# Patient Record
Sex: MALE | Race: WHITE | NOT HISPANIC OR LATINO | URBAN - METROPOLITAN AREA
[De-identification: names, ages, dates, MRNs, and addresses within clinical notes are randomized per-mention and may not be internally consistent; named-entity substitution may affect disease eponyms.]

---

## 2017-03-12 ENCOUNTER — EMERGENCY (EMERGENCY)
Facility: HOSPITAL | Age: 77
LOS: 1 days | Discharge: HOME | End: 2017-03-12
Attending: EMERGENCY MEDICINE | Admitting: EMERGENCY MEDICINE
Payer: MEDICARE

## 2017-03-12 VITALS
WEIGHT: 182.98 LBS | TEMPERATURE: 98 F | OXYGEN SATURATION: 98 % | HEART RATE: 69 BPM | RESPIRATION RATE: 16 BRPM | DIASTOLIC BLOOD PRESSURE: 79 MMHG | SYSTOLIC BLOOD PRESSURE: 128 MMHG

## 2017-03-12 DIAGNOSIS — I10 ESSENTIAL (PRIMARY) HYPERTENSION: ICD-10-CM

## 2017-03-12 DIAGNOSIS — Z79.899 OTHER LONG TERM (CURRENT) DRUG THERAPY: ICD-10-CM

## 2017-03-12 DIAGNOSIS — R78.5 FINDING OF OTHER PSYCHOTROPIC DRUG IN BLOOD: ICD-10-CM

## 2017-03-12 DIAGNOSIS — R07.89 OTHER CHEST PAIN: ICD-10-CM

## 2017-03-12 DIAGNOSIS — E87.6 HYPOKALEMIA: ICD-10-CM

## 2017-03-12 DIAGNOSIS — K22.4 DYSKINESIA OF ESOPHAGUS: ICD-10-CM

## 2017-03-12 DIAGNOSIS — R00.1 BRADYCARDIA, UNSPECIFIED: ICD-10-CM

## 2017-03-12 PROBLEM — Z00.00 ENCOUNTER FOR PREVENTIVE HEALTH EXAMINATION: Status: ACTIVE | Noted: 2017-03-12

## 2017-03-12 LAB
ALBUMIN SERPL ELPH-MCNC: 4 G/DL — SIGNIFICANT CHANGE UP (ref 3.4–5)
ALP SERPL-CCNC: 80 U/L — SIGNIFICANT CHANGE UP (ref 40–120)
ALT FLD-CCNC: 33 U/L — SIGNIFICANT CHANGE UP (ref 12–42)
ANION GAP SERPL CALC-SCNC: 10 MMOL/L — SIGNIFICANT CHANGE UP (ref 9–16)
AST SERPL-CCNC: 21 U/L — SIGNIFICANT CHANGE UP (ref 15–37)
BASOPHILS NFR BLD AUTO: 0.5 % — SIGNIFICANT CHANGE UP (ref 0–2)
BILIRUB SERPL-MCNC: 0.8 MG/DL — SIGNIFICANT CHANGE UP (ref 0.2–1.2)
BUN SERPL-MCNC: 23 MG/DL — SIGNIFICANT CHANGE UP (ref 7–23)
CALCIUM SERPL-MCNC: 9.3 MG/DL — SIGNIFICANT CHANGE UP (ref 8.5–10.5)
CHLORIDE SERPL-SCNC: 98 MMOL/L — SIGNIFICANT CHANGE UP (ref 96–108)
CK MB CFR SERPL CALC: <1 NG/ML — SIGNIFICANT CHANGE UP (ref 0.5–3.6)
CK SERPL-CCNC: 95 U/L — SIGNIFICANT CHANGE UP (ref 39–308)
CO2 SERPL-SCNC: 30 MMOL/L — SIGNIFICANT CHANGE UP (ref 22–31)
CREAT SERPL-MCNC: 1.45 MG/DL — HIGH (ref 0.5–1.3)
EOSINOPHIL NFR BLD AUTO: 1.4 % — SIGNIFICANT CHANGE UP (ref 0–6)
GLUCOSE SERPL-MCNC: 131 MG/DL — HIGH (ref 70–99)
HCT VFR BLD CALC: 42.3 % — SIGNIFICANT CHANGE UP (ref 39–50)
HGB BLD-MCNC: 15 G/DL — SIGNIFICANT CHANGE UP (ref 13–17)
LYMPHOCYTES # BLD AUTO: 14.2 % — SIGNIFICANT CHANGE UP (ref 13–44)
MCHC RBC-ENTMCNC: 31.1 PG — SIGNIFICANT CHANGE UP (ref 27–34)
MCHC RBC-ENTMCNC: 35.5 G/DL — SIGNIFICANT CHANGE UP (ref 32–36)
MCV RBC AUTO: 87.8 FL — SIGNIFICANT CHANGE UP (ref 80–100)
MONOCYTES NFR BLD AUTO: 6.1 % — SIGNIFICANT CHANGE UP (ref 2–14)
NEUTROPHILS NFR BLD AUTO: 77.8 % — HIGH (ref 43–77)
PLATELET # BLD AUTO: 174 K/UL — SIGNIFICANT CHANGE UP (ref 150–400)
POTASSIUM SERPL-MCNC: 2.8 MMOL/L — CRITICAL LOW (ref 3.5–5.3)
POTASSIUM SERPL-SCNC: 2.8 MMOL/L — CRITICAL LOW (ref 3.5–5.3)
PROT SERPL-MCNC: 7.5 G/DL — SIGNIFICANT CHANGE UP (ref 6.4–8.2)
RBC # BLD: 4.82 M/UL — SIGNIFICANT CHANGE UP (ref 4.2–5.8)
RBC # FLD: 13.3 % — SIGNIFICANT CHANGE UP (ref 10.3–16.9)
SODIUM SERPL-SCNC: 138 MMOL/L — SIGNIFICANT CHANGE UP (ref 135–145)
TROPONIN I SERPL-MCNC: 0.05 NG/ML — HIGH (ref 0.01–0.04)
WBC # BLD: 8.7 K/UL — SIGNIFICANT CHANGE UP (ref 3.8–10.5)
WBC # FLD AUTO: 8.7 K/UL — SIGNIFICANT CHANGE UP (ref 3.8–10.5)

## 2017-03-12 PROCEDURE — 71010: CPT | Mod: 26

## 2017-03-12 PROCEDURE — 99285 EMERGENCY DEPT VISIT HI MDM: CPT | Mod: 25

## 2017-03-12 PROCEDURE — 93010 ELECTROCARDIOGRAM REPORT: CPT

## 2017-03-12 RX ORDER — POTASSIUM CHLORIDE 20 MEQ
10 PACKET (EA) ORAL ONCE
Qty: 0 | Refills: 0 | Status: COMPLETED | OUTPATIENT
Start: 2017-03-12 | End: 2017-03-12

## 2017-03-12 RX ORDER — SODIUM CHLORIDE 9 MG/ML
1000 INJECTION INTRAMUSCULAR; INTRAVENOUS; SUBCUTANEOUS ONCE
Qty: 0 | Refills: 0 | Status: COMPLETED | OUTPATIENT
Start: 2017-03-12 | End: 2017-03-12

## 2017-03-12 RX ORDER — POTASSIUM CHLORIDE 20 MEQ
40 PACKET (EA) ORAL ONCE
Qty: 0 | Refills: 0 | Status: COMPLETED | OUTPATIENT
Start: 2017-03-12 | End: 2017-03-12

## 2017-03-12 RX ORDER — LIDOCAINE 4 G/100G
5 CREAM TOPICAL ONCE
Qty: 0 | Refills: 0 | Status: COMPLETED | OUTPATIENT
Start: 2017-03-12 | End: 2017-03-12

## 2017-03-12 RX ADMIN — Medication 100 MILLIEQUIVALENT(S): at 23:37

## 2017-03-12 RX ADMIN — SODIUM CHLORIDE 1333.33 MILLILITER(S): 9 INJECTION INTRAMUSCULAR; INTRAVENOUS; SUBCUTANEOUS at 22:31

## 2017-03-12 RX ADMIN — LIDOCAINE 5 MILLILITER(S): 4 CREAM TOPICAL at 22:31

## 2017-03-12 RX ADMIN — Medication 40 MILLIEQUIVALENT(S): at 23:37

## 2017-03-12 NOTE — ED PROVIDER NOTE - PROGRESS NOTE DETAILS
Pt received from Dr Walton pending repeat card enzymes and K after K repletion.  Pt resting comfortably.  No complaints.

## 2017-03-12 NOTE — ED PROVIDER NOTE - MEDICAL DECISION MAKING DETAILS
Pt with esophageal spasm secondary to prior food bolus that was dislodged on its own after episode of emesis as well as hypokalemia associated with HCTZ. Chest pain resolved after viscous lidocaine and diltiazem po. EKg shows possible U wave. Will replete and repeat potassium. Repeat troponin (given that first slightly indeterminate). Pt does not want to be admitted overnight. Can be safely discharged with PMD and GI follow up if remainder of repeat labs nl.

## 2017-03-12 NOTE — ED ADULT NURSE NOTE - OBJECTIVE STATEMENT
patient received to ED A+Ox3 with equal/unlabored breathing and afebrile. patient states he was eating and felt like he choked on a piece of food at 4 pm and it felt stuck in his chest. patient deneis any SOB or AUDIE. patient states he has had chest discomfort since. patient has been vomiting and coughing up phlegm intermittently since. patient has NKA and HX of HTN, HDL and Low Potassium.

## 2017-03-12 NOTE — ED ADULT TRIAGE NOTE - CHIEF COMPLAINT QUOTE
pt complaining of chest discomfortable nausea and one episode of vomiting. States he chocked on a piece of food this afternoon around 4pm and has not felt well since. Denies SOB, came from urgent care where the wait was 1.5hrs so pt came to ED pt complaining of chest discomfort nausea and one episode of vomiting. States he chocked on a piece of food this afternoon around 4pm and has not felt well since. Denies SOB, came from urgent care where the wait was 1.5hrs so pt came to ED

## 2017-03-12 NOTE — ED PROVIDER NOTE - OBJECTIVE STATEMENT
78 yo male with h/o HTN on HCTZ, HTN was eating brisket around 4:15pm 3/12 and began choking. Pt was able to partially swallow the food however felt like the food was "stuck" after which he was not able to tolerate his secretions and vomited secretions and food. He immediately felt better and was able to swallow his secretions but continued to have mild chest discomfort that he describe

## 2017-03-13 VITALS
OXYGEN SATURATION: 99 % | HEART RATE: 71 BPM | RESPIRATION RATE: 16 BRPM | DIASTOLIC BLOOD PRESSURE: 77 MMHG | SYSTOLIC BLOOD PRESSURE: 132 MMHG

## 2017-03-13 LAB
POTASSIUM SERPL-MCNC: 3.3 MMOL/L — LOW (ref 3.5–5.3)
POTASSIUM SERPL-SCNC: 3.3 MMOL/L — LOW (ref 3.5–5.3)
TROPONIN I SERPL-MCNC: 0.05 NG/ML — HIGH (ref 0.01–0.04)

## 2017-03-13 PROCEDURE — 96376 TX/PRO/DX INJ SAME DRUG ADON: CPT

## 2017-03-13 PROCEDURE — 84484 ASSAY OF TROPONIN QUANT: CPT

## 2017-03-13 PROCEDURE — 84132 ASSAY OF SERUM POTASSIUM: CPT

## 2017-03-13 PROCEDURE — 93005 ELECTROCARDIOGRAM TRACING: CPT

## 2017-03-13 PROCEDURE — 82553 CREATINE MB FRACTION: CPT

## 2017-03-13 PROCEDURE — 71045 X-RAY EXAM CHEST 1 VIEW: CPT

## 2017-03-13 PROCEDURE — 80053 COMPREHEN METABOLIC PANEL: CPT

## 2017-03-13 PROCEDURE — 96374 THER/PROPH/DIAG INJ IV PUSH: CPT

## 2017-03-13 PROCEDURE — 82550 ASSAY OF CK (CPK): CPT

## 2017-03-13 PROCEDURE — 36415 COLL VENOUS BLD VENIPUNCTURE: CPT

## 2017-03-13 PROCEDURE — 85025 COMPLETE CBC W/AUTO DIFF WBC: CPT

## 2017-03-13 PROCEDURE — 99284 EMERGENCY DEPT VISIT MOD MDM: CPT | Mod: 25

## 2017-03-13 RX ADMIN — Medication 100 MILLIEQUIVALENT(S): at 01:34

## 2017-03-14 ENCOUNTER — TRANSCRIPTION ENCOUNTER (OUTPATIENT)
Age: 77
End: 2017-03-14

## 2019-05-16 PROBLEM — E87.6 HYPOKALEMIA: Chronic | Status: ACTIVE | Noted: 2017-03-12

## 2019-05-16 PROBLEM — E78.5 HYPERLIPIDEMIA, UNSPECIFIED: Chronic | Status: ACTIVE | Noted: 2017-03-12

## 2019-05-20 ENCOUNTER — OUTPATIENT (OUTPATIENT)
Dept: OUTPATIENT SERVICES | Facility: HOSPITAL | Age: 79
LOS: 1 days | Discharge: HOME | End: 2019-05-20

## 2019-07-24 ENCOUNTER — OUTPATIENT (OUTPATIENT)
Dept: OUTPATIENT SERVICES | Facility: HOSPITAL | Age: 79
LOS: 1 days | Discharge: HOME | End: 2019-07-24

## 2020-12-16 ENCOUNTER — APPOINTMENT (OUTPATIENT)
Dept: NEPHROLOGY | Facility: CLINIC | Age: 80
End: 2020-12-16
Payer: MEDICARE

## 2020-12-16 ENCOUNTER — LABORATORY RESULT (OUTPATIENT)
Age: 80
End: 2020-12-16

## 2020-12-16 VITALS — SYSTOLIC BLOOD PRESSURE: 118 MMHG | HEART RATE: 54 BPM | DIASTOLIC BLOOD PRESSURE: 63 MMHG

## 2020-12-16 DIAGNOSIS — E78.1 PURE HYPERGLYCERIDEMIA: ICD-10-CM

## 2020-12-16 DIAGNOSIS — Z78.9 OTHER SPECIFIED HEALTH STATUS: ICD-10-CM

## 2020-12-16 DIAGNOSIS — C61 MALIGNANT NEOPLASM OF PROSTATE: ICD-10-CM

## 2020-12-16 DIAGNOSIS — H54.10 BLINDNESS, ONE EYE, LOW VISION OTHER EYE, UNSPECIFIED EYES: ICD-10-CM

## 2020-12-16 DIAGNOSIS — Z87.891 PERSONAL HISTORY OF NICOTINE DEPENDENCE: ICD-10-CM

## 2020-12-16 DIAGNOSIS — G47.00 INSOMNIA, UNSPECIFIED: ICD-10-CM

## 2020-12-16 PROCEDURE — 99205 OFFICE O/P NEW HI 60 MIN: CPT | Mod: 25

## 2020-12-16 PROCEDURE — 93000 ELECTROCARDIOGRAM COMPLETE: CPT

## 2020-12-16 PROCEDURE — 36415 COLL VENOUS BLD VENIPUNCTURE: CPT

## 2020-12-16 RX ORDER — LATANOPROST/PF 0.005 %
0.01 DROPS OPHTHALMIC (EYE)
Qty: 1 | Refills: 5 | Status: ACTIVE | COMMUNITY
Start: 2020-12-16

## 2020-12-16 RX ORDER — LORAZEPAM 0.5 MG/1
0.5 TABLET ORAL
Refills: 0 | Status: ACTIVE | COMMUNITY

## 2020-12-16 RX ORDER — OMEPRAZOLE 20 MG/1
20 TABLET, DELAYED RELEASE ORAL
Qty: 90 | Refills: 3 | Status: ACTIVE | COMMUNITY
Start: 1900-01-01 | End: 1900-01-01

## 2020-12-16 RX ORDER — GLUCOSAMINE HCL 500 MG
75 MCG TABLET ORAL
Qty: 90 | Refills: 0 | Status: ACTIVE | COMMUNITY
Start: 2020-12-16

## 2020-12-16 RX ORDER — ZOLPIDEM TARTRATE 10 MG/1
10 TABLET ORAL
Qty: 30 | Refills: 5 | Status: ACTIVE | COMMUNITY
Start: 2020-12-16

## 2020-12-16 NOTE — REVIEW OF SYSTEMS
[Feeling Tired] : feeling tired [Difficulty Walking] : difficulty walking [Eyesight Problems] : eyesight problems [Negative] : Heme/Lymph [de-identified] : walks with cane [de-identified] : cold intolerance

## 2020-12-16 NOTE — HISTORY OF PRESENT ILLNESS
[FreeTextEntry1] : Self-referred for general medical care. He was previously a patient of Dr. Diane. (The patient has been advised to call their office and arrange copies of notes/labs to be sent.) He is here with his wife who was also provided counseling. Retired . \par \par He is slightly unsteady and walks with a cane. No lightheadedness. HTN controlled. He doesn't check BP at home. \par \par He has had stable elevated creatinine of approximately 1.4 for at least 40 years. He uses aleve about once monthly. \par \par His wife says he has fatigue, cold intolerance, and difficulty sleeping.\par \par Fatigue, cold intolerance, difficulty sleeping. \par \par Occasional numbness on first three fingers of right hand. \par \par He occasionally has oral infections and self-treats with azithromycin. \par \par Grown on back of neck is being followed by dermatology. This has been present for years and he has not been told this is malignant. \par \par He has smoked 30 years 3 packs daily and currently smoke 1 cigarette daily. Not interested in quitting. \par \par He has never been evaluated by a cardiologist for > 20 years. \par \par Prostate cancer treated  with radiation seeds. PSA reportedly normal. \par \par Options for clinical preventative services\par \par EKG \par Cardiologist Advised to make appointment for routine evaluation [id number]. \par Lung CT Scan for Smokin,  \par Influenza: Sep 2020 \par Pneumonia: x1. He will obtain. \par Shingles: Advised [id number]. \par TDAP:  \par Colonoscopy: , "i'm not having another one"\par Dermatologist:

## 2020-12-16 NOTE — PHYSICAL EXAM
[General Appearance - Alert] : alert [General Appearance - In No Acute Distress] : in no acute distress [Sclera] : the sclera and conjunctiva were normal [PERRL With Normal Accommodation] : pupils were equal in size, round, and reactive to light [Extraocular Movements] : extraocular movements were intact [Outer Ear] : the ears and nose were normal in appearance [Oropharynx] : the oropharynx was normal [Neck Appearance] : the appearance of the neck was normal [Neck Cervical Mass (___cm)] : no neck mass was observed [Jugular Venous Distention Increased] : there was no jugular-venous distention [Thyroid Diffuse Enlargement] : the thyroid was not enlarged [Thyroid Nodule] : there were no palpable thyroid nodules [Auscultation Breath Sounds / Voice Sounds] : lungs were clear to auscultation bilaterally [Heart Rate And Rhythm] : heart rate was normal and rhythm regular [Heart Sounds] : normal S1 and S2 [Heart Sounds Gallop] : no gallops [Murmurs] : no murmurs [Heart Sounds Pericardial Friction Rub] : no pericardial rub [Edema] : there was no peripheral edema [Veins - Varicosity Changes] : there were no varicosital changes [Bowel Sounds] : normal bowel sounds [Abdomen Soft] : soft [Abdomen Tenderness] : non-tender [Abdomen Mass (___ Cm)] : no abdominal mass palpated [Cervical Lymph Nodes Enlarged Posterior Bilaterally] : posterior cervical [Cervical Lymph Nodes Enlarged Anterior Bilaterally] : anterior cervical [Supraclavicular Lymph Nodes Enlarged Bilaterally] : supraclavicular [Abnormal Walk] : normal gait [Nail Clubbing] : no clubbing  or cyanosis of the fingernails [Musculoskeletal - Swelling] : no joint swelling seen [Motor Tone] : muscle strength and tone were normal [Skin Color & Pigmentation] : normal skin color and pigmentation [Skin Turgor] : normal skin turgor [] : no rash [Oriented To Time, Place, And Person] : oriented to person, place, and time [Impaired Insight] : insight and judgment were intact [Affect] : the affect was normal [FreeTextEntry1] : walks with cane

## 2020-12-16 NOTE — ASSESSMENT
[FreeTextEntry1] : # HTN controlled.\par * Recheck labs.\par * Check EKG. Sinus bradycardia. \par * The patient has been counseled that chronic kidney disease is a significant condition and regular office followup with me (at least every 4 months for now) is essential for monitoring, and that it is their responsibility to make a follow up appointment.\par * A counseling information sheet has been given (currently or previously, in-person or electronically). All their questions were answered.\par * The patient has been counseled never to stop taking their medications without discussing it with me or another doctor.\par * The patient has been counseled on risk of acute renal failure and instructed to immediately call and speak with me or go immediately to ER with any severe symptoms, nausea, vomiting, diarrhea, chest pain, or shortness of breath.\par * The patient has been counseled on avoiding NSAIDs.\par \par # CKD stage 3 c/w aging and HTN.\par * Recheck labs, including cystatin C and monoclonal protein evaluation.\par * A point of care renal ultrasound using the Butterfly iQ was performed and the images were personally reviewed. (The patient understands that this was a brief study to evaluate for hydronephrosis and not a complete renal ultrasound.) The ultrasound showed no significant hydronephrosis. A complete renal ultrasound was recommended and information was provided to the patient about scheduling. They were instructed that this imaging study is important for their health and that it is their responsibility to make and keep this appointment. All their questions were answered.\par \par # Smoking.\par * Cessation.\par * Check CT.\par * Check EKG. \par \par # GERD.\par * Follow up with GI.\par * Attempt to change PPI to H2 blocker. \par \par # Oral infections.\par * Advised to see dentist.\par \par # Skin lesion.\par * Derm/surgery referral.\par \par # Unsteadiness and right finger numbness.\par * Neuro referral advised. \par \par # Medical Complexity\par * Diagnostic and management options are extensive (CKD, HTN, smoking, unsteadiness).\par * Amount / complexity of data reviewed is extensive.  I have ordered laboratory tests. I have ordered an EKG. A point of care renal ultrasound was performed and the images were personally reviewed.  I have ordered radiology tests.  I have decided to obtain old records. (The patient has been advised to call their office and arrange copies of notes / labs to be sent.)   I have reviewed the previous records that are available and summarized them in the HPI.\par

## 2020-12-24 LAB
25(OH)D3 SERPL-MCNC: 58.2 NG/ML
ALBUMIN MFR SERPL ELPH: 60.3 %
ALBUMIN SERPL ELPH-MCNC: 4.8 G/DL
ALBUMIN SERPL-MCNC: 4.4 G/DL
ALBUMIN/GLOB SERPL: 1.5 RATIO
ALBUPE: 15.1 %
ALP BLD-CCNC: 81 U/L
ALPHA1 GLOB MFR SERPL ELPH: 3.5 %
ALPHA1 GLOB SERPL ELPH-MCNC: 0.3 G/DL
ALPHA1UPE: 46.1 %
ALPHA2 GLOB MFR SERPL ELPH: 12.6 %
ALPHA2 GLOB SERPL ELPH-MCNC: 0.9 G/DL
ALPHA2UPE: 16.1 %
ALT SERPL-CCNC: 18 U/L
ANION GAP SERPL CALC-SCNC: 17 MMOL/L
APPEARANCE: CLEAR
AST SERPL-CCNC: 16 U/L
B-GLOBULIN MFR SERPL ELPH: 10.1 %
B-GLOBULIN SERPL ELPH-MCNC: 0.7 G/DL
BASOPHILS # BLD AUTO: 0.05 K/UL
BASOPHILS NFR BLD AUTO: 0.6 %
BETAUPE: 13.9 %
BILIRUB SERPL-MCNC: 0.6 MG/DL
BILIRUBIN URINE: NEGATIVE
BLOOD URINE: NEGATIVE
BUN SERPL-MCNC: 23 MG/DL
CALCIUM SERPL-MCNC: 9.7 MG/DL
CALCIUM SERPL-MCNC: 9.7 MG/DL
CHLORIDE SERPL-SCNC: 102 MMOL/L
CHOLEST SERPL-MCNC: 133 MG/DL
CO2 SERPL-SCNC: 24 MMOL/L
COLOR: YELLOW
CREAT SERPL-MCNC: 1.34 MG/DL
CREAT SPEC-SCNC: 175 MG/DL
CREAT SPEC-SCNC: 175 MG/DL
CREAT/PROT UR: 0.1 RATIO
CYSTATIN C SERPL-MCNC: 1.72 MG/L
DEPRECATED KAPPA LC FREE/LAMBDA SER: 1.26 RATIO
EOSINOPHIL # BLD AUTO: 0.22 K/UL
EOSINOPHIL NFR BLD AUTO: 2.6 %
ESTIMATED AVERAGE GLUCOSE: 120 MG/DL
FERRITIN SERPL-MCNC: 535 NG/ML
GAMMA GLOB FLD ELPH-MCNC: 1 G/DL
GAMMA GLOB MFR SERPL ELPH: 13.5 %
GAMMAUPE: 8.8 %
GFR/BSA.PRED SERPLBLD CYS-BASED-ARV: 35 ML/MIN
GLUCOSE QUALITATIVE U: NEGATIVE
GLUCOSE SERPL-MCNC: 97 MG/DL
HBA1C MFR BLD HPLC: 5.8 %
HCT VFR BLD CALC: 43.1 %
HDLC SERPL-MCNC: 45 MG/DL
HGB BLD-MCNC: 13.4 G/DL
IGA 24H UR QL IFE: NORMAL
IGA SER QL IEP: 171 MG/DL
IGG SER QL IEP: 967 MG/DL
IGM SER QL IEP: 67 MG/DL
IMM GRANULOCYTES NFR BLD AUTO: 0.2 %
INTERPRETATION SERPL IEP-IMP: NORMAL
KAPPA LC 24H UR QL: NORMAL
KAPPA LC CSF-MCNC: 1.97 MG/DL
KAPPA LC SERPL-MCNC: 2.49 MG/DL
KETONES URINE: NEGATIVE
LDLC SERPL CALC-MCNC: 67 MG/DL
LEUKOCYTE ESTERASE URINE: NEGATIVE
LYMPHOCYTES # BLD AUTO: 2.9 K/UL
LYMPHOCYTES NFR BLD AUTO: 33.7 %
M PROTEIN SPEC IFE-MCNC: NORMAL
MAGNESIUM SERPL-MCNC: 2.1 MG/DL
MAN DIFF?: NORMAL
MCHC RBC-ENTMCNC: 30.6 PG
MCHC RBC-ENTMCNC: 31.1 GM/DL
MCV RBC AUTO: 98.4 FL
MICROALBUMIN 24H UR DL<=1MG/L-MCNC: 1.7 MG/DL
MICROALBUMIN/CREAT 24H UR-RTO: 9 MG/G
MONOCYTES # BLD AUTO: 0.61 K/UL
MONOCYTES NFR BLD AUTO: 7.1 %
NEUTROPHILS # BLD AUTO: 4.81 K/UL
NEUTROPHILS NFR BLD AUTO: 55.8 %
NITRITE URINE: NEGATIVE
NONHDLC SERPL-MCNC: 88 MG/DL
PARATHYROID HORMONE INTACT: 57 PG/ML
PH URINE: 5.5
PHOSPHATE SERPL-MCNC: 3.2 MG/DL
PLATELET # BLD AUTO: 174 K/UL
POTASSIUM SERPL-SCNC: 4.1 MMOL/L
PROT PATTERN 24H UR ELPH-IMP: NORMAL
PROT SERPL-MCNC: 7.3 G/DL
PROT UR-MCNC: 18 MG/DL
PROT UR-MCNC: 18 MG/DL
PROT UR-MCNC: 24 MG/DL
PROTEIN URINE: NORMAL
RBC # BLD: 4.38 M/UL
RBC # FLD: 14.3 %
SODIUM SERPL-SCNC: 142 MMOL/L
SPECIFIC GRAVITY URINE: 1.02
TRIGL SERPL-MCNC: 103 MG/DL
TSH SERPL-ACNC: 2.03 UIU/ML
UROBILINOGEN URINE: NORMAL
WBC # FLD AUTO: 8.61 K/UL

## 2020-12-30 ENCOUNTER — APPOINTMENT (OUTPATIENT)
Dept: NEPHROLOGY | Facility: CLINIC | Age: 80
End: 2020-12-30
Payer: MEDICARE

## 2020-12-30 DIAGNOSIS — R79.89 OTHER SPECIFIED ABNORMAL FINDINGS OF BLOOD CHEMISTRY: ICD-10-CM

## 2020-12-30 PROCEDURE — 99442: CPT | Mod: 95

## 2020-12-30 NOTE — ASSESSMENT
[FreeTextEntry1] : # HTN controlled.\par * The patient's blood pressure was checked with the [employer] [id number]-[id number] using the SPRINT trial protocol after sitting quietly in an empty room with arm supported, back supported, and feet on the floor for 5 minutes. The average of 3 readings were taken.\par * A counseling information sheet has been given (currently or previously, in-person or electronically). All their questions were answered.\par * The patient has been counseled to check their BP at home with an automatic arm cuff, write down the readings, and reach me directly on the phone immediately if they are persistently > 180 systolic or if SBP is less than 100 or if lightheadedness develops. They were counseled to bring in all blood pressure readings and medications next visit.\par * The patient has been counseled that they have a a significant medical condition and regular office followup (at least every 4 months for now) is essential for monitoring, and that it is their responsibility to make follow up appointments.\par * The patient also has been counseled that they must never stop or change any medications without discussing this with me (or another physician). \par \par # CKD stage 3 c/w aging and HTN.\par * The patient has been counseled that chronic kidney disease is a significant condition and regular office followup with me (at least every 4 months for now) is essential for monitoring, and that it is their responsibility to make a follow up appointment.\par * A counseling information sheet has been given (currently or previously, in-person or electronically). All their questions were answered.\par * The patient has been counseled never to stop taking their medications without discussing it with me or another doctor.\par * The patient has been counseled on risk of acute renal failure and instructed to immediately call and speak with me or go immediately to ER with any severe symptoms, nausea, vomiting, diarrhea, chest pain, or shortness of breath.\par * The patient has been counseled on avoiding NSAIDs.\par \par # Smoking.\par * Cessation.\par * Check CT.\par \par # GERD.\par * Follow up with GI.\par * Attempt to change PPI to H2 blocker. \par \par # Oral infections.\par * Advised to see dentist.\par \par # Skin lesion.\par * Derm/surgery referral.\par \par # Unsteadiness and right finger numbness.\par * Neuro referral advised.

## 2020-12-30 NOTE — HISTORY OF PRESENT ILLNESS
[Home] : at home, [unfilled] , at the time of the visit. [Medical Office: ([facility])___] : at the medical office located in  [Verbal consent obtained from patient] : the patient, [unfilled] [FreeTextEntry1] : Self-referred for general medical care. He was previously a patient of Dr. Diane. (The patient has been advised to call their office and arrange copies of notes/labs to be sent.)\par \par * HTN controlled. * CKD stable, creatinine 1.34. * Borderline DM, HGA1c 5.8. * Smoking 3 cigarettes weekly. * Borderline high ferritin. \par \par Previous history ([id number]): He is slightly unsteady and walks with a cane. No lightheadedness. HTN controlled. He doesn't check BP at home. \par \par He has had stable elevated creatinine of approximately 1.4 for at least 40 years. He uses aleve about once monthly. \par \par His wife says he has fatigue, cold intolerance, and difficulty sleeping.\par \par Fatigue, cold intolerance, difficulty sleeping. \par \par Occasional numbness on first three fingers of right hand. \par \par He occasionally has oral infections and self-treats with azithromycin. \par \par Grown on back of neck is being followed by dermatology. This has been present for years and he has not been told this is malignant. \par \par He has smoked 30 years 3 packs daily and currently smoke 1 cigarette daily. Not interested in quitting. \par \par He has never been evaluated by a cardiologist for > 20 years. \par \par Prostate cancer treated  with radiation seeds. PSA reportedly normal. \par \par Options for clinical preventative services\par \par EKG \par Cardiologist Advised to make appointment for routine evaluation [id number]. \par Lung CT Scan for Smokin,  \par Influenza: Sep 2020 \par Pneumonia: x1. He will obtain. \par Shingles: Advised [id number]. \par TDAP:  2018\par Colonoscopy: , "i'm not having another one"\par Dermatologist:

## 2021-04-19 ENCOUNTER — APPOINTMENT (OUTPATIENT)
Dept: SURGERY | Facility: CLINIC | Age: 81
End: 2021-04-19
Payer: MEDICARE

## 2021-04-19 VITALS
HEART RATE: 99 BPM | WEIGHT: 158 LBS | OXYGEN SATURATION: 98 % | SYSTOLIC BLOOD PRESSURE: 154 MMHG | HEIGHT: 68 IN | BODY MASS INDEX: 23.95 KG/M2 | DIASTOLIC BLOOD PRESSURE: 86 MMHG | TEMPERATURE: 97.3 F

## 2021-04-19 PROCEDURE — 99202 OFFICE O/P NEW SF 15 MIN: CPT

## 2021-05-07 NOTE — ASSESSMENT
[FreeTextEntry1] : 81 year old male. Bothersome neck lesion. Wishes removal. We discussed the risks, benefits and alternatives to excision of the lesion including but not limited to bleeding, infection, death, disability, recurrence, need for additional procedure, issues with sensation to be expected, nerve injury, displeasure with cosmetic outcome, blood clots, cardiac and pulmonary issues and other issues. The patient does wish to proceed with surgery. I answered all questions.\par

## 2021-05-07 NOTE — PHYSICAL EXAM
[Normal Breath Sounds] : Normal breath sounds [Normal Heart Sounds] : normal heart sounds [Normal Rate and Rhythm] : normal rate and rhythm [Alert] : alert [Oriented to Person] : oriented to person [Oriented to Place] : oriented to place [Oriented to Time] : oriented to time [Tender] : was nontender [Enlarged] : not enlarged [Calm] : calm [de-identified] : NAD, comfortable [de-identified] : Normocephalic, atraumatic. No scleral icterus.  [de-identified] : Supple, no JVD or cervical lymphadenopathy. There is a lesion posteri neck. Stigmata of prior drainage.  [de-identified] : No respiratory distress.  [de-identified] : +BS soft, nontender, nondistended. No overt abdominal mass. \par   [de-identified] : Calm

## 2021-05-07 NOTE — HISTORY OF PRESENT ILLNESS
[de-identified] : Patient is an 82 y/o male presenting to the office for evaluation and management of a neck lesion. \par Lesions has been followed by dermatology for many years. OCcasionally have small drainage. Bothers him great deal. Wishes to have the neck lesion removed.

## 2021-06-16 ENCOUNTER — APPOINTMENT (OUTPATIENT)
Dept: NEPHROLOGY | Facility: CLINIC | Age: 81
End: 2021-06-16
Payer: MEDICARE

## 2021-06-16 VITALS — DIASTOLIC BLOOD PRESSURE: 67 MMHG | HEART RATE: 70 BPM | SYSTOLIC BLOOD PRESSURE: 119 MMHG

## 2021-06-16 DIAGNOSIS — L98.9 DISORDER OF THE SKIN AND SUBCUTANEOUS TISSUE, UNSPECIFIED: ICD-10-CM

## 2021-06-16 PROCEDURE — 36415 COLL VENOUS BLD VENIPUNCTURE: CPT

## 2021-06-16 PROCEDURE — 99214 OFFICE O/P EST MOD 30 MIN: CPT | Mod: 25

## 2021-06-16 NOTE — PHYSICAL EXAM
[General Appearance - Alert] : alert [General Appearance - In No Acute Distress] : in no acute distress [Neck Appearance] : the appearance of the neck was normal [Neck Cervical Mass (___cm)] : no neck mass was observed [Jugular Venous Distention Increased] : there was no jugular-venous distention [Thyroid Diffuse Enlargement] : the thyroid was not enlarged [Thyroid Nodule] : there were no palpable thyroid nodules [Auscultation Breath Sounds / Voice Sounds] : lungs were clear to auscultation bilaterally [Heart Rate And Rhythm] : heart rate was normal and rhythm regular [Heart Sounds] : normal S1 and S2 [Heart Sounds Gallop] : no gallops [Murmurs] : no murmurs [Heart Sounds Pericardial Friction Rub] : no pericardial rub [FreeTextEntry1] : 2 cm soft rubbery lesion on back of neck c/w lipoma  [Edema] : there was no peripheral edema [Bowel Sounds] : normal bowel sounds [Abdomen Soft] : soft [Abdomen Tenderness] : non-tender [] : no hepato-splenomegaly [Abdomen Mass (___ Cm)] : no abdominal mass palpated [Cervical Lymph Nodes Enlarged Posterior Bilaterally] : posterior cervical [Cervical Lymph Nodes Enlarged Anterior Bilaterally] : anterior cervical [Supraclavicular Lymph Nodes Enlarged Bilaterally] : supraclavicular

## 2021-06-16 NOTE — HISTORY OF PRESENT ILLNESS
[FreeTextEntry1] : Self-referred for general medical care. He was previously a patient of Dr. Diane. (The patient has been advised to call their office and arrange copies of notes/labs to be sent.)\par \par * Doesn’t check BP at home.  No lightheadedness. Compliant with medications. * CKD previously stable, creatinine 1.34. * Unchanged slight unsteadiness and right finger numbness present for years .\par \par Previous history ([id number]): * HTN controlled. * CKD stable, creatinine 1.34. * Borderline DM, HGA1c 5.8. * Smoking 3 cigarettes weekly. * Borderline high ferritin. \par \par Previous history (24Ppg92): He is slightly unsteady and walks with a cane. No lightheadedness. HTN controlled. He doesn't check BP at home. \par \par He has had stable elevated creatinine of approximately 1.4 for at least 40 years. He uses aleve about once monthly. \par \par His wife says he has fatigue, cold intolerance, and difficulty sleeping.\par \par Fatigue, cold intolerance, difficulty sleeping. \par \par Occasional numbness on first three fingers of right hand. \par \par He occasionally has oral infections and self-treats with azithromycin. \par \par Grown on back of neck is being followed by dermatology. This has been present for years and he has not been told this is malignant. \par \par He has smoked 30 years 3 packs daily and currently smoke 1 cigarette daily. Not interested in quitting. \par \par He has never been evaluated by a cardiologist for > 20 years. \par \par Prostate cancer treated  with radiation seeds. PSA reportedly normal. \par \par Options for clinical preventative services\par \par Covid vaccine February Pfizer \par EKG Dec 20 \par Cardiologist Advised to make appointment for routine evaluation [id number]. \par Lung CT Scan for Smokin2021 \par Influenza: Sep 2020 \par Pneumonia: x1. He will obtain. \par Shingles: Advised [id number].  \par TDAP:  \par Colonoscopy: , "i'm not having another one"\par Dermatologist:

## 2021-06-16 NOTE — ASSESSMENT
[FreeTextEntry1] : # HTN controlled.\par * Cont amlodipine, lisinopril.\par * The patient's blood pressure was checked with the Omron HEM-907XL using the SPRINT trial protocol after sitting quietly in an empty room with arm supported, back supported, and feet on the floor for 5 minutes. The average of 3 readings were taken.\par * A counseling information sheet has been given (currently or previously, in-person or electronically). All their questions were answered.\par * The patient has been counseled to check their BP at home with an automatic arm cuff, write down the readings, and reach me directly on the phone immediately if they are persistently > 180 systolic or if SBP is less than 100 or if lightheadedness develops. They were counseled to bring in all blood pressure readings and medications next visit.\par * The patient has been counseled that regular office followup (at least every 4 months for now)  is important for monitoring and for their health, and that it is their responsibility to make follow up appointments.\par * The patient also has been counseled that they must never stop or change any medications without discussing this with me (or another physician). \par \par # CKD stage 3.\par * Recheck labs.\par * Therapies for kidney disease: blood pressure control; proteinuria reduction with ARB/ACEi; other evidence-based therapies including exercise, a plant-based lower oxalate diet, and 400 mcg folic acid daily\par * Cardiovascular disease prevention: counseling on healthy diet, physical activity, weight loss, alcohol limitation, blood pressure control\par * The patient has been counseled that chronic kidney disease is a significant condition and regular office followup with me (at least every 4 months for now) is important for monitoring and their health, and that it is their responsibility to make a follow up appointment.\par * The patient has been counseled never to stop taking their medications without discussing it with me or another doctor.\par * The patient has been counseled on avoiding NSAIDs.\par * The patient has been counseled on risk of acute renal failure and instructed to immediately call and speak with me or go immediately to ER with any severe symptoms, nausea, vomiting, diarrhea, chest pain, or shortness of breath.\par * A counseling information sheet has been given (today or previously). All their questions were answered.\par \par \par # Smoking.\par * Cessation.\par * Check CT.\par \par # GERD.\par * Follow up with GI.\par \par # Oral infections.\par * Advised to see dentist.\par \par # Skin lesion.\par * Surgery followup. \par \par # Unsteadiness and right finger numbness.\par * Neuro referral advised.  They were instructed that this referral is important for their health and that it is their responsibility to make and keep this appointment. All their questions were answered. \par \par

## 2021-06-19 LAB
25(OH)D3 SERPL-MCNC: 64.1 NG/ML
ALBUMIN SERPL ELPH-MCNC: 4.7 G/DL
ALP BLD-CCNC: 87 U/L
ALT SERPL-CCNC: 18 U/L
ANION GAP SERPL CALC-SCNC: 13 MMOL/L
AST SERPL-CCNC: 20 U/L
BASOPHILS # BLD AUTO: 0.03 K/UL
BASOPHILS NFR BLD AUTO: 0.4 %
BILIRUB SERPL-MCNC: 0.5 MG/DL
BUN SERPL-MCNC: 21 MG/DL
CALCIUM SERPL-MCNC: 10.1 MG/DL
CALCIUM SERPL-MCNC: 10.1 MG/DL
CHLORIDE SERPL-SCNC: 103 MMOL/L
CHOLEST SERPL-MCNC: 117 MG/DL
CO2 SERPL-SCNC: 26 MMOL/L
CREAT SERPL-MCNC: 1.38 MG/DL
EOSINOPHIL # BLD AUTO: 0.14 K/UL
EOSINOPHIL NFR BLD AUTO: 2 %
ESTIMATED AVERAGE GLUCOSE: 114 MG/DL
FERRITIN SERPL-MCNC: 494 NG/ML
GLUCOSE SERPL-MCNC: 97 MG/DL
HBA1C MFR BLD HPLC: 5.6 %
HCT VFR BLD CALC: 42.1 %
HDLC SERPL-MCNC: 46 MG/DL
HGB BLD-MCNC: 13.3 G/DL
IMM GRANULOCYTES NFR BLD AUTO: 0.3 %
LDLC SERPL CALC-MCNC: 48 MG/DL
LYMPHOCYTES # BLD AUTO: 1.89 K/UL
LYMPHOCYTES NFR BLD AUTO: 27.1 %
MAGNESIUM SERPL-MCNC: 2.5 MG/DL
MAN DIFF?: NORMAL
MCHC RBC-ENTMCNC: 30.8 PG
MCHC RBC-ENTMCNC: 31.6 GM/DL
MCV RBC AUTO: 97.5 FL
MONOCYTES # BLD AUTO: 0.46 K/UL
MONOCYTES NFR BLD AUTO: 6.6 %
NEUTROPHILS # BLD AUTO: 4.44 K/UL
NEUTROPHILS NFR BLD AUTO: 63.6 %
NONHDLC SERPL-MCNC: 71 MG/DL
PARATHYROID HORMONE INTACT: 56 PG/ML
PHOSPHATE SERPL-MCNC: 3.2 MG/DL
PLATELET # BLD AUTO: 160 K/UL
POTASSIUM SERPL-SCNC: 4.5 MMOL/L
PROT SERPL-MCNC: 7.1 G/DL
RBC # BLD: 4.32 M/UL
RBC # FLD: 13.8 %
SODIUM SERPL-SCNC: 141 MMOL/L
TRIGL SERPL-MCNC: 115 MG/DL
TSH SERPL-ACNC: 1.55 UIU/ML
VIT B12 SERPL-MCNC: 674 PG/ML
WBC # FLD AUTO: 6.98 K/UL

## 2021-07-01 ENCOUNTER — APPOINTMENT (OUTPATIENT)
Dept: NEPHROLOGY | Facility: CLINIC | Age: 81
End: 2021-07-01

## 2021-10-21 VITALS
TEMPERATURE: 98 F | RESPIRATION RATE: 18 BRPM | DIASTOLIC BLOOD PRESSURE: 78 MMHG | OXYGEN SATURATION: 94 % | SYSTOLIC BLOOD PRESSURE: 122 MMHG | HEART RATE: 98 BPM

## 2021-10-21 LAB
ALBUMIN SERPL ELPH-MCNC: 4.4 G/DL — SIGNIFICANT CHANGE UP (ref 3.3–5)
ALP SERPL-CCNC: 72 U/L — SIGNIFICANT CHANGE UP (ref 40–120)
ALT FLD-CCNC: SIGNIFICANT CHANGE UP U/L (ref 10–45)
ANION GAP SERPL CALC-SCNC: 12 MMOL/L — SIGNIFICANT CHANGE UP (ref 5–17)
APPEARANCE UR: CLEAR — SIGNIFICANT CHANGE UP
APTT BLD: 30.8 SEC — SIGNIFICANT CHANGE UP (ref 27.5–35.5)
AST SERPL-CCNC: SIGNIFICANT CHANGE UP U/L (ref 10–40)
BASOPHILS # BLD AUTO: 0 K/UL — SIGNIFICANT CHANGE UP (ref 0–0.2)
BASOPHILS NFR BLD AUTO: 0 % — SIGNIFICANT CHANGE UP (ref 0–2)
BILIRUB SERPL-MCNC: 0.7 MG/DL — SIGNIFICANT CHANGE UP (ref 0.2–1.2)
BILIRUB UR-MCNC: NEGATIVE — SIGNIFICANT CHANGE UP
BUN SERPL-MCNC: 24 MG/DL — HIGH (ref 7–23)
CALCIUM SERPL-MCNC: 10.1 MG/DL — SIGNIFICANT CHANGE UP (ref 8.4–10.5)
CHLORIDE SERPL-SCNC: 102 MMOL/L — SIGNIFICANT CHANGE UP (ref 96–108)
CO2 SERPL-SCNC: 24 MMOL/L — SIGNIFICANT CHANGE UP (ref 22–31)
COLOR SPEC: YELLOW — SIGNIFICANT CHANGE UP
CREAT SERPL-MCNC: 1.38 MG/DL — HIGH (ref 0.5–1.3)
DIFF PNL FLD: NEGATIVE — SIGNIFICANT CHANGE UP
EOSINOPHIL # BLD AUTO: 0 K/UL — SIGNIFICANT CHANGE UP (ref 0–0.5)
EOSINOPHIL NFR BLD AUTO: 0 % — SIGNIFICANT CHANGE UP (ref 0–6)
GAS PNL BLDV: SIGNIFICANT CHANGE UP
GLUCOSE SERPL-MCNC: 135 MG/DL — HIGH (ref 70–99)
GLUCOSE UR QL: NEGATIVE — SIGNIFICANT CHANGE UP
HCT VFR BLD CALC: 38 % — LOW (ref 39–50)
HGB BLD-MCNC: 12.7 G/DL — LOW (ref 13–17)
INR BLD: 1.09 — SIGNIFICANT CHANGE UP (ref 0.88–1.16)
KETONES UR-MCNC: NEGATIVE — SIGNIFICANT CHANGE UP
LACTATE SERPL-SCNC: 3 MMOL/L — HIGH (ref 0.5–2)
LACTATE SERPL-SCNC: 3.4 MMOL/L — HIGH (ref 0.5–2)
LEUKOCYTE ESTERASE UR-ACNC: NEGATIVE — SIGNIFICANT CHANGE UP
LYMPHOCYTES # BLD AUTO: 0.16 K/UL — LOW (ref 1–3.3)
LYMPHOCYTES # BLD AUTO: 1.8 % — LOW (ref 13–44)
MCHC RBC-ENTMCNC: 31.4 PG — SIGNIFICANT CHANGE UP (ref 27–34)
MCHC RBC-ENTMCNC: 33.4 GM/DL — SIGNIFICANT CHANGE UP (ref 32–36)
MCV RBC AUTO: 94.1 FL — SIGNIFICANT CHANGE UP (ref 80–100)
MONOCYTES # BLD AUTO: 0.23 K/UL — SIGNIFICANT CHANGE UP (ref 0–0.9)
MONOCYTES NFR BLD AUTO: 2.6 % — SIGNIFICANT CHANGE UP (ref 2–14)
NEUTROPHILS # BLD AUTO: 8.58 K/UL — HIGH (ref 1.8–7.4)
NEUTROPHILS NFR BLD AUTO: 94.7 % — HIGH (ref 43–77)
NITRITE UR-MCNC: NEGATIVE — SIGNIFICANT CHANGE UP
PH UR: 7.5 — SIGNIFICANT CHANGE UP (ref 5–8)
PLATELET # BLD AUTO: 169 K/UL — SIGNIFICANT CHANGE UP (ref 150–400)
POTASSIUM SERPL-MCNC: SIGNIFICANT CHANGE UP MMOL/L (ref 3.5–5.3)
POTASSIUM SERPL-SCNC: SIGNIFICANT CHANGE UP MMOL/L (ref 3.5–5.3)
PROT SERPL-MCNC: 7.4 G/DL — SIGNIFICANT CHANGE UP (ref 6–8.3)
PROT UR-MCNC: ABNORMAL MG/DL
PROTHROM AB SERPL-ACNC: 13 SEC — SIGNIFICANT CHANGE UP (ref 10.6–13.6)
RBC # BLD: 4.04 M/UL — LOW (ref 4.2–5.8)
RBC # FLD: 13.4 % — SIGNIFICANT CHANGE UP (ref 10.3–14.5)
SARS-COV-2 RNA SPEC QL NAA+PROBE: NEGATIVE — SIGNIFICANT CHANGE UP
SODIUM SERPL-SCNC: 138 MMOL/L — SIGNIFICANT CHANGE UP (ref 135–145)
SP GR SPEC: 1.02 — SIGNIFICANT CHANGE UP (ref 1–1.03)
UROBILINOGEN FLD QL: 1 E.U./DL — SIGNIFICANT CHANGE UP
WBC # BLD: 8.97 K/UL — SIGNIFICANT CHANGE UP (ref 3.8–10.5)
WBC # FLD AUTO: 8.97 K/UL — SIGNIFICANT CHANGE UP (ref 3.8–10.5)

## 2021-10-21 PROCEDURE — 93010 ELECTROCARDIOGRAM REPORT: CPT

## 2021-10-21 PROCEDURE — 71045 X-RAY EXAM CHEST 1 VIEW: CPT | Mod: 26

## 2021-10-21 PROCEDURE — 99291 CRITICAL CARE FIRST HOUR: CPT | Mod: CS

## 2021-10-21 RX ORDER — SODIUM CHLORIDE 9 MG/ML
1000 INJECTION INTRAMUSCULAR; INTRAVENOUS; SUBCUTANEOUS ONCE
Refills: 0 | Status: COMPLETED | OUTPATIENT
Start: 2021-10-21 | End: 2021-10-21

## 2021-10-21 RX ORDER — PIPERACILLIN AND TAZOBACTAM 4; .5 G/20ML; G/20ML
3.38 INJECTION, POWDER, LYOPHILIZED, FOR SOLUTION INTRAVENOUS ONCE
Refills: 0 | Status: COMPLETED | OUTPATIENT
Start: 2021-10-21 | End: 2021-10-21

## 2021-10-21 RX ORDER — VANCOMYCIN HCL 1 G
1000 VIAL (EA) INTRAVENOUS ONCE
Refills: 0 | Status: COMPLETED | OUTPATIENT
Start: 2021-10-21 | End: 2021-10-21

## 2021-10-21 RX ORDER — ACETAMINOPHEN 500 MG
650 TABLET ORAL ONCE
Refills: 0 | Status: COMPLETED | OUTPATIENT
Start: 2021-10-21 | End: 2021-10-21

## 2021-10-21 RX ORDER — SODIUM CHLORIDE 9 MG/ML
200 INJECTION INTRAMUSCULAR; INTRAVENOUS; SUBCUTANEOUS ONCE
Refills: 0 | Status: COMPLETED | OUTPATIENT
Start: 2021-10-21 | End: 2021-10-21

## 2021-10-21 RX ORDER — ONDANSETRON 8 MG/1
4 TABLET, FILM COATED ORAL ONCE
Refills: 0 | Status: COMPLETED | OUTPATIENT
Start: 2021-10-21 | End: 2021-10-21

## 2021-10-21 RX ADMIN — SODIUM CHLORIDE 1000 MILLILITER(S): 9 INJECTION INTRAMUSCULAR; INTRAVENOUS; SUBCUTANEOUS at 20:11

## 2021-10-21 RX ADMIN — Medication 650 MILLIGRAM(S): at 20:04

## 2021-10-21 RX ADMIN — Medication 250 MILLIGRAM(S): at 21:20

## 2021-10-21 RX ADMIN — ONDANSETRON 4 MILLIGRAM(S): 8 TABLET, FILM COATED ORAL at 20:11

## 2021-10-21 RX ADMIN — SODIUM CHLORIDE 200 MILLILITER(S): 9 INJECTION INTRAMUSCULAR; INTRAVENOUS; SUBCUTANEOUS at 21:00

## 2021-10-21 RX ADMIN — PIPERACILLIN AND TAZOBACTAM 3.38 GRAM(S): 4; .5 INJECTION, POWDER, LYOPHILIZED, FOR SOLUTION INTRAVENOUS at 21:00

## 2021-10-21 RX ADMIN — SODIUM CHLORIDE 1000 MILLILITER(S): 9 INJECTION INTRAMUSCULAR; INTRAVENOUS; SUBCUTANEOUS at 21:00

## 2021-10-21 RX ADMIN — SODIUM CHLORIDE 200 MILLILITER(S): 9 INJECTION INTRAMUSCULAR; INTRAVENOUS; SUBCUTANEOUS at 20:47

## 2021-10-21 RX ADMIN — SODIUM CHLORIDE 1000 MILLILITER(S): 9 INJECTION INTRAMUSCULAR; INTRAVENOUS; SUBCUTANEOUS at 21:54

## 2021-10-21 RX ADMIN — PIPERACILLIN AND TAZOBACTAM 200 GRAM(S): 4; .5 INJECTION, POWDER, LYOPHILIZED, FOR SOLUTION INTRAVENOUS at 20:47

## 2021-10-21 NOTE — ED PROVIDER NOTE - PROGRESS NOTE DETAILS
pt ambulatory in his room, had a large BM on the floor.  says feeling much better. helped him into bed and cleaned up.

## 2021-10-21 NOTE — ED ADULT NURSE NOTE - NSIMPLEMENTINTERV_GEN_ALL_ED
Implemented All Fall Risk Interventions:  [place] to call system. Call bell, personal items and telephone within reach. Instruct patient to call for assistance. Room bathroom lighting operational. Non-slip footwear when patient is off stretcher. Physically safe environment: no spills, clutter or unnecessary equipment. Stretcher in lowest position, wheels locked, appropriate side rails in place. Provide visual cue, wrist band, yellow gown, etc. Monitor gait and stability. Monitor for mental status changes and reorient to person, place, and time. Review medications for side effects contributing to fall risk. Reinforce activity limits and safety measures with patient and family.

## 2021-10-21 NOTE — ED ADULT NURSE NOTE - NS ED NURSE REPORT GIVEN DT
Faxed in the appeal form and medical records.  
I did the Peer to Peer. They still deny it. We need to do an appeal process. There should be paper work with the denial. Please send it in.
Received notification that this case was denied.  I scanned denial that you can find under Media.    I arranged a Peer to Peer with Dr. Sher who will call you tomorrow Friday 8/1/20 at 12:15 p.m.    Sending you a confirmation email.    Thank you  
Surgery is authorized.  
Thank you
22-Oct-2021 04:03

## 2021-10-21 NOTE — ED PROVIDER NOTE - CLINICAL SUMMARY MEDICAL DECISION MAKING FREE TEXT BOX
generalized weakness/ episode of vomiting. warm to touch. rectal temp done, febrile.  concern for sepsis.  labs/ blood and urine cultures obtained and started 30/kg fluid bolus along with empiric vanc/zosyn.  ua to r/o uti, cxr to r/o pneumonia. lactate elevated, ua neg, cxr neg, wbc wnl. cmp hemolyzed will repeat. covid neg. unclear source, possible viral vs intraabdominal infection. will ct a/p. signed out to Dr. Vieira/ EVA Salazar pending ct/ repeat cmp, plan admit generalized weakness/ episode of vomiting. warm to touch. rectal temp done, febrile to 102.  concern for sepsis.  labs/ blood and urine cultures obtained and started 30/kg fluid bolus along with empiric vanc/zosyn.  ua to r/o uti, cxr to r/o pneumonia. lactate elevated, ua neg, cxr neg, wbc wnl. cmp hemolyzed will repeat. covid neg. unclear source, possible viral vs intraabdominal infection. will ct a/p. signed out to Dr. Vieira/ EVA Salazar pending ct/ repeat cmp, plan admit

## 2021-10-21 NOTE — ED PROVIDER NOTE - CONSTITUTIONAL, MLM
Well appearing, awake, alert, oriented to person, place, and in no apparent distress. feels warm normal...

## 2021-10-21 NOTE — ED ADULT TRIAGE NOTE - OTHER COMPLAINTS
patient BIBEMS from home --- as per EMS and patient wife, patient received flu shot today and became weak while defacating-- patient reports generalized weakness -- arrives [id number]

## 2021-10-21 NOTE — ED PROVIDER NOTE - OBJECTIVE STATEMENT
history of htn, high cholesterol, prostate cancer, here with spouse for generalized weakness, nausea/vomiting, change in mental status. Wife reports they went for flu shot at noon. About 2 hours after, started to feel sick/ weak. Nauseous and vomited x1. Wanted to go to the bathroom for bm but unable to walk there on own. Used walker, sat on toilet. Had formed bm but then too weak to get up. Wife helped him to floor. No syncope/loc. Says currently feels a little nauseous, weak. Denies cough, chest pain, abdominal pain, sick contacts.

## 2021-10-21 NOTE — ED ADULT NURSE NOTE - OBJECTIVE STATEMENT
Pt presents to ED c/o AMS after BM today. Pt currently AAOx2-3, speaking in short sentences with eyes closed . 20g PIV placed, labs collected.

## 2021-10-22 ENCOUNTER — TRANSCRIPTION ENCOUNTER (OUTPATIENT)
Age: 81
End: 2021-10-22

## 2021-10-22 ENCOUNTER — INPATIENT (INPATIENT)
Facility: HOSPITAL | Age: 81
LOS: 0 days | Discharge: HOME | DRG: 948 | End: 2021-10-22
Attending: STUDENT IN AN ORGANIZED HEALTH CARE EDUCATION/TRAINING PROGRAM | Admitting: STUDENT IN AN ORGANIZED HEALTH CARE EDUCATION/TRAINING PROGRAM
Payer: COMMERCIAL

## 2021-10-22 VITALS — SYSTOLIC BLOOD PRESSURE: 91 MMHG | DIASTOLIC BLOOD PRESSURE: 56 MMHG | HEART RATE: 69 BPM | TEMPERATURE: 100 F

## 2021-10-22 DIAGNOSIS — K59.00 CONSTIPATION, UNSPECIFIED: ICD-10-CM

## 2021-10-22 DIAGNOSIS — Z98.890 OTHER SPECIFIED POSTPROCEDURAL STATES: Chronic | ICD-10-CM

## 2021-10-22 DIAGNOSIS — R11.2 NAUSEA WITH VOMITING, UNSPECIFIED: ICD-10-CM

## 2021-10-22 DIAGNOSIS — N17.9 ACUTE KIDNEY FAILURE, UNSPECIFIED: ICD-10-CM

## 2021-10-22 DIAGNOSIS — R53.1 WEAKNESS: ICD-10-CM

## 2021-10-22 DIAGNOSIS — I10 ESSENTIAL (PRIMARY) HYPERTENSION: ICD-10-CM

## 2021-10-22 DIAGNOSIS — R63.8 OTHER SYMPTOMS AND SIGNS CONCERNING FOOD AND FLUID INTAKE: ICD-10-CM

## 2021-10-22 DIAGNOSIS — E78.5 HYPERLIPIDEMIA, UNSPECIFIED: ICD-10-CM

## 2021-10-22 DIAGNOSIS — R41.82 ALTERED MENTAL STATUS, UNSPECIFIED: ICD-10-CM

## 2021-10-22 LAB
ALBUMIN SERPL ELPH-MCNC: 3.7 G/DL — SIGNIFICANT CHANGE UP (ref 3.3–5)
ALP SERPL-CCNC: 58 U/L — SIGNIFICANT CHANGE UP (ref 40–120)
ALT FLD-CCNC: 28 U/L — SIGNIFICANT CHANGE UP (ref 10–45)
ANION GAP SERPL CALC-SCNC: 8 MMOL/L — SIGNIFICANT CHANGE UP (ref 5–17)
AST SERPL-CCNC: 51 U/L — HIGH (ref 10–40)
BASOPHILS # BLD AUTO: 0.02 K/UL — SIGNIFICANT CHANGE UP (ref 0–0.2)
BASOPHILS NFR BLD AUTO: 0.3 % — SIGNIFICANT CHANGE UP (ref 0–2)
BILIRUB SERPL-MCNC: 0.5 MG/DL — SIGNIFICANT CHANGE UP (ref 0.2–1.2)
BUN SERPL-MCNC: 20 MG/DL — SIGNIFICANT CHANGE UP (ref 7–23)
CALCIUM SERPL-MCNC: 9 MG/DL — SIGNIFICANT CHANGE UP (ref 8.4–10.5)
CHLORIDE SERPL-SCNC: 107 MMOL/L — SIGNIFICANT CHANGE UP (ref 96–108)
CHOLEST SERPL-MCNC: 82 MG/DL — SIGNIFICANT CHANGE UP
CO2 SERPL-SCNC: 24 MMOL/L — SIGNIFICANT CHANGE UP (ref 22–31)
CREAT SERPL-MCNC: 1.5 MG/DL — HIGH (ref 0.5–1.3)
EOSINOPHIL # BLD AUTO: 0 K/UL — SIGNIFICANT CHANGE UP (ref 0–0.5)
EOSINOPHIL NFR BLD AUTO: 0 % — SIGNIFICANT CHANGE UP (ref 0–6)
GLUCOSE SERPL-MCNC: 98 MG/DL — SIGNIFICANT CHANGE UP (ref 70–99)
HCT VFR BLD CALC: 34.7 % — LOW (ref 39–50)
HDLC SERPL-MCNC: 39 MG/DL — LOW
HGB BLD-MCNC: 11.3 G/DL — LOW (ref 13–17)
IMM GRANULOCYTES NFR BLD AUTO: 0.4 % — SIGNIFICANT CHANGE UP (ref 0–1.5)
LACTATE SERPL-SCNC: 1.3 MMOL/L — SIGNIFICANT CHANGE UP (ref 0.5–2)
LIPID PNL WITH DIRECT LDL SERPL: 34 MG/DL — SIGNIFICANT CHANGE UP
LYMPHOCYTES # BLD AUTO: 0.19 K/UL — LOW (ref 1–3.3)
LYMPHOCYTES # BLD AUTO: 2.7 % — LOW (ref 13–44)
MCHC RBC-ENTMCNC: 31.6 PG — SIGNIFICANT CHANGE UP (ref 27–34)
MCHC RBC-ENTMCNC: 32.6 GM/DL — SIGNIFICANT CHANGE UP (ref 32–36)
MCV RBC AUTO: 96.9 FL — SIGNIFICANT CHANGE UP (ref 80–100)
MONOCYTES # BLD AUTO: 0.33 K/UL — SIGNIFICANT CHANGE UP (ref 0–0.9)
MONOCYTES NFR BLD AUTO: 4.7 % — SIGNIFICANT CHANGE UP (ref 2–14)
NEUTROPHILS # BLD AUTO: 6.48 K/UL — SIGNIFICANT CHANGE UP (ref 1.8–7.4)
NEUTROPHILS NFR BLD AUTO: 91.9 % — HIGH (ref 43–77)
NON HDL CHOLESTEROL: 43 MG/DL — SIGNIFICANT CHANGE UP
NRBC # BLD: 0 /100 WBCS — SIGNIFICANT CHANGE UP (ref 0–0)
PLATELET # BLD AUTO: 148 K/UL — LOW (ref 150–400)
POTASSIUM SERPL-MCNC: 4.5 MMOL/L — SIGNIFICANT CHANGE UP (ref 3.5–5.3)
POTASSIUM SERPL-SCNC: 4.5 MMOL/L — SIGNIFICANT CHANGE UP (ref 3.5–5.3)
PROT SERPL-MCNC: 6 G/DL — SIGNIFICANT CHANGE UP (ref 6–8.3)
RAPID RVP RESULT: SIGNIFICANT CHANGE UP
RBC # BLD: 3.58 M/UL — LOW (ref 4.2–5.8)
RBC # FLD: 14 % — SIGNIFICANT CHANGE UP (ref 10.3–14.5)
SARS-COV-2 RNA SPEC QL NAA+PROBE: SIGNIFICANT CHANGE UP
SODIUM SERPL-SCNC: 139 MMOL/L — SIGNIFICANT CHANGE UP (ref 135–145)
TRIGL SERPL-MCNC: 44 MG/DL — SIGNIFICANT CHANGE UP
WBC # BLD: 7.05 K/UL — SIGNIFICANT CHANGE UP (ref 3.8–10.5)
WBC # FLD AUTO: 7.05 K/UL — SIGNIFICANT CHANGE UP (ref 3.8–10.5)

## 2021-10-22 PROCEDURE — 82803 BLOOD GASES ANY COMBINATION: CPT

## 2021-10-22 PROCEDURE — 85025 COMPLETE CBC W/AUTO DIFF WBC: CPT

## 2021-10-22 PROCEDURE — 70450 CT HEAD/BRAIN W/O DYE: CPT

## 2021-10-22 PROCEDURE — 96375 TX/PRO/DX INJ NEW DRUG ADDON: CPT

## 2021-10-22 PROCEDURE — 36415 COLL VENOUS BLD VENIPUNCTURE: CPT

## 2021-10-22 PROCEDURE — 96365 THER/PROPH/DIAG IV INF INIT: CPT

## 2021-10-22 PROCEDURE — 71045 X-RAY EXAM CHEST 1 VIEW: CPT

## 2021-10-22 PROCEDURE — 81001 URINALYSIS AUTO W/SCOPE: CPT

## 2021-10-22 PROCEDURE — 85730 THROMBOPLASTIN TIME PARTIAL: CPT

## 2021-10-22 PROCEDURE — 87086 URINE CULTURE/COLONY COUNT: CPT

## 2021-10-22 PROCEDURE — 97161 PT EVAL LOW COMPLEX 20 MIN: CPT

## 2021-10-22 PROCEDURE — 70450 CT HEAD/BRAIN W/O DYE: CPT | Mod: 26

## 2021-10-22 PROCEDURE — 82330 ASSAY OF CALCIUM: CPT

## 2021-10-22 PROCEDURE — 99223 1ST HOSP IP/OBS HIGH 75: CPT | Mod: GC

## 2021-10-22 PROCEDURE — 87040 BLOOD CULTURE FOR BACTERIA: CPT

## 2021-10-22 PROCEDURE — 84132 ASSAY OF SERUM POTASSIUM: CPT

## 2021-10-22 PROCEDURE — 0225U NFCT DS DNA&RNA 21 SARSCOV2: CPT

## 2021-10-22 PROCEDURE — 74177 CT ABD & PELVIS W/CONTRAST: CPT | Mod: MA

## 2021-10-22 PROCEDURE — 85610 PROTHROMBIN TIME: CPT

## 2021-10-22 PROCEDURE — 99291 CRITICAL CARE FIRST HOUR: CPT

## 2021-10-22 PROCEDURE — 87635 SARS-COV-2 COVID-19 AMP PRB: CPT

## 2021-10-22 PROCEDURE — 80061 LIPID PANEL: CPT

## 2021-10-22 PROCEDURE — 80053 COMPREHEN METABOLIC PANEL: CPT

## 2021-10-22 PROCEDURE — 83605 ASSAY OF LACTIC ACID: CPT

## 2021-10-22 PROCEDURE — 84295 ASSAY OF SERUM SODIUM: CPT

## 2021-10-22 PROCEDURE — 74177 CT ABD & PELVIS W/CONTRAST: CPT | Mod: 26,MA

## 2021-10-22 RX ORDER — ENOXAPARIN SODIUM 100 MG/ML
40 INJECTION SUBCUTANEOUS EVERY 24 HOURS
Refills: 0 | Status: DISCONTINUED | OUTPATIENT
Start: 2021-10-22 | End: 2021-10-22

## 2021-10-22 RX ORDER — ATORVASTATIN CALCIUM 80 MG/1
40 TABLET, FILM COATED ORAL AT BEDTIME
Refills: 0 | Status: DISCONTINUED | OUTPATIENT
Start: 2021-10-22 | End: 2021-10-22

## 2021-10-22 RX ORDER — OMEPRAZOLE 10 MG/1
0 CAPSULE, DELAYED RELEASE ORAL
Qty: 0 | Refills: 0 | DISCHARGE

## 2021-10-22 RX ORDER — ACETAMINOPHEN 500 MG
650 TABLET ORAL EVERY 6 HOURS
Refills: 0 | Status: DISCONTINUED | OUTPATIENT
Start: 2021-10-22 | End: 2021-10-22

## 2021-10-22 RX ORDER — SENNA PLUS 8.6 MG/1
2 TABLET ORAL AT BEDTIME
Refills: 0 | Status: DISCONTINUED | OUTPATIENT
Start: 2021-10-22 | End: 2021-10-22

## 2021-10-22 RX ORDER — OMEPRAZOLE 10 MG/1
1 CAPSULE, DELAYED RELEASE ORAL
Qty: 0 | Refills: 0 | DISCHARGE

## 2021-10-22 RX ORDER — FAMOTIDINE 10 MG/ML
0 INJECTION INTRAVENOUS
Qty: 0 | Refills: 2 | DISCHARGE

## 2021-10-22 RX ORDER — AMLODIPINE BESYLATE 2.5 MG/1
1 TABLET ORAL
Qty: 0 | Refills: 2 | DISCHARGE

## 2021-10-22 RX ORDER — ONDANSETRON 8 MG/1
4 TABLET, FILM COATED ORAL EVERY 8 HOURS
Refills: 0 | Status: DISCONTINUED | OUTPATIENT
Start: 2021-10-22 | End: 2021-10-22

## 2021-10-22 RX ORDER — PANTOPRAZOLE SODIUM 20 MG/1
40 TABLET, DELAYED RELEASE ORAL
Refills: 0 | Status: DISCONTINUED | OUTPATIENT
Start: 2021-10-22 | End: 2021-10-22

## 2021-10-22 RX ORDER — LISINOPRIL 2.5 MG/1
0 TABLET ORAL
Qty: 0 | Refills: 1 | DISCHARGE

## 2021-10-22 RX ORDER — ATORVASTATIN CALCIUM 80 MG/1
1 TABLET, FILM COATED ORAL
Qty: 0 | Refills: 0 | DISCHARGE

## 2021-10-22 RX ORDER — LISINOPRIL 2.5 MG/1
1 TABLET ORAL
Qty: 0 | Refills: 1 | DISCHARGE

## 2021-10-22 RX ORDER — POTASSIUM CHLORIDE 20 MEQ
1 PACKET (EA) ORAL
Qty: 0 | Refills: 0 | DISCHARGE

## 2021-10-22 RX ORDER — POTASSIUM CHLORIDE 20 MEQ
40 PACKET (EA) ORAL ONCE
Refills: 0 | Status: COMPLETED | OUTPATIENT
Start: 2021-10-22 | End: 2021-10-22

## 2021-10-22 RX ORDER — LANOLIN ALCOHOL/MO/W.PET/CERES
3 CREAM (GRAM) TOPICAL AT BEDTIME
Refills: 0 | Status: DISCONTINUED | OUTPATIENT
Start: 2021-10-22 | End: 2021-10-22

## 2021-10-22 RX ORDER — HYDROCHLOROTHIAZIDE 25 MG
25 TABLET ORAL DAILY
Refills: 0 | Status: DISCONTINUED | OUTPATIENT
Start: 2021-10-22 | End: 2021-10-22

## 2021-10-22 RX ORDER — FAMOTIDINE 10 MG/ML
1 INJECTION INTRAVENOUS
Qty: 0 | Refills: 2 | DISCHARGE

## 2021-10-22 RX ORDER — POLYETHYLENE GLYCOL 3350 17 G/17G
17 POWDER, FOR SOLUTION ORAL EVERY 24 HOURS
Refills: 0 | Status: DISCONTINUED | OUTPATIENT
Start: 2021-10-22 | End: 2021-10-22

## 2021-10-22 RX ORDER — AMLODIPINE BESYLATE 2.5 MG/1
0 TABLET ORAL
Qty: 0 | Refills: 2 | DISCHARGE

## 2021-10-22 RX ORDER — SODIUM CHLORIDE 9 MG/ML
1000 INJECTION INTRAMUSCULAR; INTRAVENOUS; SUBCUTANEOUS ONCE
Refills: 0 | Status: COMPLETED | OUTPATIENT
Start: 2021-10-22 | End: 2021-10-22

## 2021-10-22 RX ORDER — HYDROCHLOROTHIAZIDE 25 MG
25 TABLET ORAL EVERY 24 HOURS
Refills: 0 | Status: DISCONTINUED | OUTPATIENT
Start: 2021-10-22 | End: 2021-10-22

## 2021-10-22 RX ADMIN — ENOXAPARIN SODIUM 40 MILLIGRAM(S): 100 INJECTION SUBCUTANEOUS at 09:00

## 2021-10-22 RX ADMIN — SODIUM CHLORIDE 100 MILLILITER(S): 9 INJECTION INTRAMUSCULAR; INTRAVENOUS; SUBCUTANEOUS at 05:02

## 2021-10-22 RX ADMIN — PANTOPRAZOLE SODIUM 40 MILLIGRAM(S): 20 TABLET, DELAYED RELEASE ORAL at 09:00

## 2021-10-22 NOTE — PROGRESS NOTE ADULT - PROBLEM SELECTOR PLAN 4
pt p/w SMITH BUN/Cr 24/1.38 s/p 2.2L NS -->22/1.32, lactate 3.4-->3.0 likely 2/2 to poor PO intake and n/v.  - On 10/22 Cr 1.32 which is his baseline     #Tachycardia-RESOLVED  HR in 90s. imp post fluids-->79. likely 2/2 to dehydration.    To do:  - f/u with Dr. Avila outpatient for further management

## 2021-10-22 NOTE — DISCHARGE NOTE PROVIDER - HOSPITAL COURSE
#Discharge: do not delete    Patient is 82 yo M with past medical history of high cholesterol, prostate cancer presented with 1 day of generalized weakness, nausea/vomiting, AMS, Stool distended rectum with circumferential rectal wall thickening on CT abdomen.    Hospital course (by problem):  1.      Patient was discharged to: (home/MOY/acute rehab/hospice, etc, and with what services – home health PT/RN? Home O2?)    New medications:   Changes to old medications:  Medications that were stopped:    Items to follow up as outpatient:    Physical exam at the time of discharge:       #Discharge: do not delete    Patient is 80 yo M with past medical history of high cholesterol, prostate cancer presented with 1 day of generalized weakness, nausea/vomiting, AMS, Stool distended rectum with circumferential rectal wall thickening on CT abdomen.    Hospital course (by problem):  1.  Altered menta status  - CT head: NAD  - UA wnl, Blood culture NGTD, Urine culture NGTD   - CT abd/pel w/ iv cont: Stool distended rectum with circumferential rectal wall thickening. Subtle perirectal fat infiltration. Early developing stercoral colitis cannot be entirely excluded. There is associated fecal stasis throughout the large bowel. Findings consistent with fecal impaction.  - lactate down to 1.3 from 3.4 on admission      Patient was discharged to: (home/MOY/acute rehab/hospice, etc, and with what services – home health PT/RN? Home O2?)    New medications: none   Changes to old medications: none  Medications that were stopped: none    Items to follow up as outpatient:    Physical exam at the time of discharge:  General: disheveled, NAD  HEENT: NC/AT; PERRL, anicteric sclera; MMM  Neck: supple  Cardiovascular: heart sounds distant, +S1/S2, RRR  Respiratory: CTA B/L; no W/R/R  Gastrointestinal: soft, NT/ND; +BSx4  Extremities: WWP; no edema, clubbing or cyanosis  Vascular: 2+ radial, DP/PT pulses B/L  Neurological: AAOx1 (self); no focal deficits  Psychiatric: pleasant mood and affect, tangential   Dermatologic: no appreciable wounds or damage to the skin     #Discharge: do not delete    Patient is 82 yo M with past medical history of high cholesterol, prostate cancer presented with 1 day of generalized weakness, nausea/vomiting, AMS, Stool distended rectum with circumferential rectal wall thickening on CT abdomen.    Hospital course (by problem):  1.  Altered mental status and generalized weakness  - RESOLVED  - CT head: NAD  - UA wnl, Blood culture NGTD, Urine culture NGTD   - CT abd/pel w/ iv cont: Stool distended rectum with circumferential rectal wall thickening. Subtle perirectal fat infiltration. Early developing stercoral colitis cannot be entirely excluded. There is associated fecal stasis throughout the large bowel. Findings consistent with fecal impaction.  - lactate down to 1.3 from 3.4 on admission  - PT recommends: home w/ home PT   - f/u vit B12 and TFT    2. Constipation, nausea and vomitting  RESOLVED  - can take Miralax outpatient if constipated     3. SMITH (acute kidney injury).   RESOLVED  - On 10/22 Cr 1.32 which is baseline   - f/u with Dr. Avila outpatient for further management.       Patient was discharged to: home w/ home PT   New medications: none   Changes to old medications: none  Medications that were stopped: none    Items to follow up as outpatient: TFT, Vit B12 level     Physical exam at the time of discharge:  General: disheveled, NAD  HEENT: NC/AT; PERRL, anicteric sclera; MMM  Neck: supple  Cardiovascular: heart sounds distant, +S1/S2, RRR  Respiratory: CTA B/L; no W/R/R  Gastrointestinal: soft, NT/ND; +BSx4  Extremities: WWP; no edema, clubbing or cyanosis  Vascular: 2+ radial, DP/PT pulses B/L  Neurological: AAOx1 (self); no focal deficits  Psychiatric: pleasant mood and affect, tangential   Dermatologic: no appreciable wounds or damage to the skin

## 2021-10-22 NOTE — ED ADULT NURSE REASSESSMENT NOTE - NS ED NURSE REASSESS COMMENT FT1
Pt made bowel second movement, hygiene care was performed, warm blankets were provided. Pt pending admission.

## 2021-10-22 NOTE — PROGRESS NOTE ADULT - PROBLEM SELECTOR PLAN 3
Detail Level: Zone
Detail Level: Simple
- Pt w episode of NBNB vomiting. no epidoes since admission. zofran x1 in ed.  - CT showing fecal impaction and early possible stercoral colitis. however patient had 1 large BM in ED      To do:  -cw zofran prn  -cw miralax senna

## 2021-10-22 NOTE — DISCHARGE NOTE NURSING/CASE MANAGEMENT/SOCIAL WORK - PATIENT PORTAL LINK FT
You can access the FollowMyHealth Patient Portal offered by [facility] by registering at the following website: [email or web address]. By joining [facility]’s FollowMyHealth portal, you will also be able to view your health information using other applications (apps) compatible with our system.

## 2021-10-22 NOTE — CONSULT NOTE ADULT - ASSESSMENT
per Internal Medicine    82 yo M w/ history of high cholesterol, prostate cancer p/w 1 day of generalized weakness, nausea/vomiting, AMS admitted for w/up and monitoring     Problem/Plan - 1:  ·  Problem: Generalized weakness.   ·  Plan: patient pt w/ generalized weakness after flu vaccination. improved post 2.2L NS fluid resuscitation. tx empirically w vanc and zosyn in the ED. infectious w/u unremarkable. meeting 1/4 SIRS for HR in 90s  -will hold off on further abx for now   -f/u blood cultures   -PT consult.    Problem/Plan - 2:  ·  Problem: Altered mental status.   ·  Plan: for 1 day. AOx3 at baseline. today Aox1 after flu shot. no focal deficits on exam. infectious w/up unremarkable. lytes wnl. likely 2/2 to vaccination  -f/u free t4, tsh, rpr  -collateral from wife/PCP  -f/u CT head.    Problem/Plan - 3:  ·  Problem: Nausea & vomiting.   ·  Plan: Pt w episode of NBNB vomiting. no epidoes since admission. zofran x1 in ed. CT showing fecal impaction and early possible stercoral colitis. however patient had 1 large BM in ED  -c/w symptomatic management  -zofran prn  -miralax senna.    Problem/Plan - 4:  ·  Problem: SMITH (acute kidney injury).   ·  Plan: pt p/w SMITH BUN/Cr 24/1.38 s/p 2.2L NS -->22/1.32, lactate 3.4-->3.0 likely 2/2 to poor PO intake and n/v.  -c/w maintenance fluids   -f/u AM lactate    #Tachycardia-RESOLVED  HR in 90s. imp post fluids-->79. likely 2/2 to dehydration. patient also w/ SMITH.    Problem/Plan - 5:  ·  Problem: Constipation.   ·  Plan: #Constipation  found on CT s/p BM in ED  -c/w senna miralax.    Problem/Plan - 6:  ·  Problem: Hyperlipemia.   ·  Plan: hx of hyperlipidemia on atorvastatin 40mg qd  -c/w home med.    Problem/Plan - 7:  ·  Problem: Nutrition, metabolism, and development symptoms.   ·  Plan: Fluids: 100cc NS/10 hours   Electrolytes: Mg>2, K>4  Nutrition:  No IVF currently needed, replete lytes PRN  Prophylaxis: lovenox   Activity: AAT, OOBTC  GI: none  C: FC  Dispo: Admit to [facility].

## 2021-10-22 NOTE — H&P ADULT - ASSESSMENT
pt is an 82yo M w/ history of htn, high cholesterol, prostate cancer, here with spouse for generalized weakness, nausea/vomiting, lethargy/fatigue admitted for monitoring  p 82yo M w/ history of high cholesterol, prostate cancer p/w 1 day of generalized weakness, nausea/vomiting, AMS admitted for w/up and monitoring  pt is a 80yo M w/ history of high cholesterol, prostate cancer p/w 1 day of generalized weakness, nausea/vomiting, AMS admitted for w/up and monitoring

## 2021-10-22 NOTE — H&P ADULT - PROBLEM SELECTOR PLAN 7
Fluids: 100cc NS/10 hours   Electrolytes: Mg>2, K>4  Nutrition:  No IVF currently needed, replete lytes PRN  Prophylaxis: lovenox   Activity: AAT, OOBTC  GI: none  C: FC  Dispo: Admit to [facility]

## 2021-10-22 NOTE — PROGRESS NOTE ADULT - SUBJECTIVE AND OBJECTIVE BOX
OVERNIGHT EVENTS:    SUBJECTIVE / INTERVAL HPI: Patient seen and examined at bedside.     VITAL SIGNS:  Vital Signs Last 24 Hrs  T(C): 37.1 (22 Oct 2021 04:47), Max: 37.2 (22 Oct 2021 01:00)  T(F): 98.7 (22 Oct 2021 04:47), Max: 98.9 (22 Oct 2021 01:00)  HR: 77 (22 Oct 2021 04:47) (77 - 98)  BP: 107/65 (22 Oct 2021 04:47) (98/60 - 122/78)  BP(mean): --  RR: 18 (22 Oct 2021 04:47) (18 - 18)  SpO2: 99% (22 Oct 2021 04:47) (94% - 99%)    PHYSICAL EXAM:    General: WDWN  HEENT: NC/AT; PERRL, anicteric sclera; MMM  Neck: supple  Cardiovascular: +S1/S2, RRR  Respiratory: CTA B/L; no W/R/R  Gastrointestinal: soft, NT/ND; +BSx4  Extremities: WWP; no edema, clubbing or cyanosis  Vascular: 2+ radial, DP/PT pulses B/L  Neurological: AAOx3; no focal deficits    MEDICATIONS:  MEDICATIONS  (STANDING):  atorvastatin 40 milliGRAM(s) Oral at bedtime  enoxaparin Injectable 40 milliGRAM(s) SubCutaneous every 24 hours  pantoprazole    Tablet 40 milliGRAM(s) Oral before breakfast  polyethylene glycol 3350 17 Gram(s) Oral every 24 hours  senna 2 Tablet(s) Oral at bedtime    MEDICATIONS  (PRN):  acetaminophen     Tablet .. 650 milliGRAM(s) Oral every 6 hours PRN Temp greater or equal to 38C (100.4F), Mild Pain (1 - 3)  aluminum hydroxide/magnesium hydroxide/simethicone Suspension 30 milliLiter(s) Oral every 6 hours PRN Dyspepsia  melatonin 3 milliGRAM(s) Oral at bedtime PRN Insomnia  ondansetron Injectable 4 milliGRAM(s) IV Push every 8 hours PRN Nausea and/or Vomiting      ALLERGIES:  Allergies    No Known Allergies    Intolerances        LABS:                        12.7   8.97  )-----------( 169      ( 21 Oct 2021 19:58 )             38.0     10-21    142  |  107  |  22  ----------------------------<  109<H>  3.6   |  24  |  1.32<H>    Ca    8.9      21 Oct 2021 21:31    TPro  6.0  /  Alb  3.4  /  TBili  0.6  /  DBili  x   /  AST  17  /  ALT  12  /  AlkPhos  59  10-21    PT/INR - ( 21 Oct 2021 19:57 )   PT: 13.0 sec;   INR: 1.09          PTT - ( 21 Oct 2021 19:57 )  PTT:30.8 sec  Urinalysis Basic - ( 21 Oct 2021 21:12 )    Color: Yellow / Appearance: Clear / S.020 / pH: x  Gluc: x / Ketone: NEGATIVE  / Bili: Negative / Urobili: 1.0 E.U./dL   Blood: x / Protein: Trace mg/dL / Nitrite: NEGATIVE   Leuk Esterase: NEGATIVE / RBC: < 5 /HPF / WBC < 5 /HPF   Sq Epi: x / Non Sq Epi: 0-1 /HPF / Bacteria: Present /HPF      CAPILLARY BLOOD GLUCOSE          RADIOLOGY & ADDITIONAL TESTS: Reviewed. OVERNIGHT EVENTS: No acute events overnight     SUBJECTIVE / INTERVAL HPI: Patient seen and examined at bedside. He is resting comfortably and does not appear in any acute distress. He denies headache, vision disturbance, N/V, chest pain, SOB, abdominal pain, diarrhea, numbness or weakness. However, he does mentions that he has word finding difficulty at baseline.     VITAL SIGNS:  Vital Signs Last 24 Hrs  T(C): 37.1 (22 Oct 2021 04:47), Max: 37.2 (22 Oct 2021 01:00)  T(F): 98.7 (22 Oct 2021 04:47), Max: 98.9 (22 Oct 2021 01:00)  HR: 77 (22 Oct 2021 04:47) (77 - 98)  BP: 107/65 (22 Oct 2021 04:47) (98/60 - 122/78)  BP(mean): --  RR: 18 (22 Oct 2021 04:47) (18 - 18)  SpO2: 99% (22 Oct 2021 04:47) (94% - 99%)    PHYSICAL EXAM:    General: WDWN, AAO x3  HEENT: NC/AT; PERRL, anicteric sclera; MMM  Neck: supple  Cardiovascular: +S1/S2, RRR  Respiratory: CTA B/L; no W/R/R  Gastrointestinal: soft, NT/ND; +BSx4  Extremities: WWP; no edema, clubbing or cyanosis  Vascular: 2+ radial, DP/PT pulses B/L  Neurological: AAOx3; no focal deficits    MEDICATIONS:  MEDICATIONS  (STANDING):  atorvastatin 40 milliGRAM(s) Oral at bedtime  enoxaparin Injectable 40 milliGRAM(s) SubCutaneous every 24 hours  pantoprazole    Tablet 40 milliGRAM(s) Oral before breakfast  polyethylene glycol 3350 17 Gram(s) Oral every 24 hours  senna 2 Tablet(s) Oral at bedtime    MEDICATIONS  (PRN):  acetaminophen     Tablet .. 650 milliGRAM(s) Oral every 6 hours PRN Temp greater or equal to 38C (100.4F), Mild Pain (1 - 3)  aluminum hydroxide/magnesium hydroxide/simethicone Suspension 30 milliLiter(s) Oral every 6 hours PRN Dyspepsia  melatonin 3 milliGRAM(s) Oral at bedtime PRN Insomnia  ondansetron Injectable 4 milliGRAM(s) IV Push every 8 hours PRN Nausea and/or Vomiting      ALLERGIES:  Allergies    No Known Allergies    Intolerances        LABS:                        12.7   8.97  )-----------( 169      ( 21 Oct 2021 19:58 )             38.0     10    142  |  107  |  22  ----------------------------<  109<H>  3.6   |  24  |  1.32<H>    Ca    8.9      21 Oct 2021 21:31    TPro  6.0  /  Alb  3.4  /  TBili  0.6  /  DBili  x   /  AST  17  /  ALT  12  /  AlkPhos  59  10    PT/INR - ( 21 Oct 2021 19:57 )   PT: 13.0 sec;   INR: 1.09          PTT - ( 21 Oct 2021 19:57 )  PTT:30.8 sec  Urinalysis Basic - ( 21 Oct 2021 21:12 )    Color: Yellow / Appearance: Clear / S.020 / pH: x  Gluc: x / Ketone: NEGATIVE  / Bili: Negative / Urobili: 1.0 E.U./dL   Blood: x / Protein: Trace mg/dL / Nitrite: NEGATIVE   Leuk Esterase: NEGATIVE / RBC: < 5 /HPF / WBC < 5 /HPF   Sq Epi: x / Non Sq Epi: 0-1 /HPF / Bacteria: Present /HPF      CAPILLARY BLOOD GLUCOSE          RADIOLOGY & ADDITIONAL TESTS: Reviewed.

## 2021-10-22 NOTE — PROGRESS NOTE ADULT - PROBLEM SELECTOR PLAN 1
patient pt w/ generalized weakness after flu vaccination. improved post 2.2L NS fluid resuscitation. tx empirically w vanc and zosyn in the ED. infectious w/u unremarkable. meeting 1/4 SIRS for HR in 90s  - CT head: NAD  - UA wnl, Blood culture NGTD, Urine culture NGTD   - CT abd/pel w/ iv cont: Stool distended rectum with circumferential rectal wall thickening. Subtle perirectal fat infiltration. Early developing stercoral colitis cannot be entirely excluded. There is associated fecal stasis throughout the large bowel. Findings consistent with fecal impaction.  - lactate down to 1.3 from 3.4 on admission    To do:   - f/u PT recs for a possible dc today

## 2021-10-22 NOTE — PROGRESS NOTE ADULT - ASSESSMENT
pt is a 82yo M w/ history of high cholesterol, prostate cancer p/w 1 day of generalized weakness, nausea/vomiting, AMS admitted for w/up and monitoring

## 2021-10-22 NOTE — PHYSICAL THERAPY INITIAL EVALUATION ADULT - IMPAIRMENTS CONTRIBUTING TO GAIT DEVIATIONS, PT EVAL
impaired endurance/impaired balance/decreased flexibility/impaired postural control/decreased strength

## 2021-10-22 NOTE — H&P ADULT - NSICDXPASTMEDICALHX_GEN_ALL_CORE_FT
PAST MEDICAL HISTORY:  CA of prostate     HTN (hypertension)     Hyperlipemia     Low potassium syndrome      PAST MEDICAL HISTORY:  CA of prostate     Hyperlipemia     Low potassium syndrome

## 2021-10-22 NOTE — H&P ADULT - PROBLEM SELECTOR PLAN 4
hx of HTN on HCTZ 25mg qd. normotensive on admission  -c/w home med pt p/w SMITH BUN/Cr 24/1.38 s/p 2.2L NS -->22/1.32, lactate 3.4-->3.0 likely 2/2 to poor PO intake and n/v.  -c/w maintenance fluids   -f/u AM lactate pt p/w SMITH BUN/Cr 24/1.38 s/p 2.2L NS -->22/1.32, lactate 3.4-->3.0 likely 2/2 to poor PO intake and n/v.  -c/w maintenance fluids   -f/u AM lactate    #Tachycardia-RESOLVED  HR in 90s. imp post fluids-->79. likely 2/2 to dehydration. patient also w/ SMITH

## 2021-10-22 NOTE — DISCHARGE NOTE PROVIDER - NSDCMRMEDTOKEN_GEN_ALL_CORE_FT
amLODIPine 2.5 mg oral tablet: 1 tab(s) orally once a day  atorvastatin 40 mg oral tablet: 1 tab(s) orally once a day  famotidine 20 mg oral tablet: 1 tab(s) orally once a day  lisinopril 5 mg oral tablet: 1 tab(s) orally once a day  omeprazole 20 mg oral delayed release capsule: 1 cap(s) orally once a day

## 2021-10-22 NOTE — ED ADULT NURSE REASSESSMENT NOTE - NS ED NURSE REASSESS COMMENT FT1
Hygiene care was performed. Pt made 1 bowel movement. Pt repositioned, warm blankets provided. Plan of care explained to patient and family member. Will continue to monitor. Hygiene care was performed. Pt made 1 bowel movement. Skin intact. Pt repositioned, warm blankets provided. Plan of care explained to patient and family member. Will continue to monitor.

## 2021-10-22 NOTE — H&P ADULT - PROBLEM SELECTOR PLAN 2
Pt w episode of NBNB vomiting. no epidoes since admission. zofran x1 in ed. CT showing fecal impaction and early possible stercoral colitis. however patient had 1 large BM in ED  -c/w symptomatic management  -zofran prn  -miralax senna    #Tachycardia-RESOLVED  HR in 90s. imp post fluids-->79. likely 2/2 to dehydration. patient also w/ SMITH    #Constipation  found on CT s/p BM in ED  -c/w senna miralax for 1 day. AOx3 at baseline. today Aox1 after flu shot. no focal deficits on exam. infectious w/up unremarkable. lytes wnl.  -likely 2/2 to vaccination for 1 day. AOx3 at baseline. today Aox1 after flu shot. no focal deficits on exam. infectious w/up unremarkable. lytes wnl. likely 2/2 to vaccination  -collateral from wife/PCP  -f/u CT head for 1 day. AOx3 at baseline. today Aox1 after flu shot. no focal deficits on exam. infectious w/up unremarkable. lytes wnl. likely 2/2 to vaccination  -f/u free t4, tsh, rpr  -collateral from wife/PCP  -f/u CT head

## 2021-10-22 NOTE — H&P ADULT - ATTENDING COMMENTS
Pt seen and examined by me at bedside this AM. Agree with above with additions,   pt states felt overall weakness yesterday after Flu shot. Denies fever/chills/abd pain.   Had one large BM yesterday in ED. No complaints today.   VSS   comfortable   +S1/S2 RRR   +bs/nt/nd  LEs: 5/5 both LEs muscle strength, LLE limited slightly 2/2 L hip pain, no ecchymosis, full ROM  labs reviewed     a/p:  1. Weakness   2. Constipation  3. HLD  4. HTN  5, CKD stage III    -patient feels well, weakness/ache likely 2/2 flu vaccine  -mental status returned to baseline  -c/w home meds  -pending PT recs   Dispo: Anticipated discharge home.

## 2021-10-22 NOTE — PHYSICAL THERAPY INITIAL EVALUATION ADULT - ADDITIONAL COMMENTS
Patient is a limited community ambulator who lives with his wife in an elevator access apartment, no [facility]. Patient reports to ambulating without any assistive devices, however, occasionally will use SC or RW "when he feels he needs it". Of note, patient also has quad cane for use at home. Patient states he is independent with all ADLs, but reports occasional assistance from wife at home, as needed.

## 2021-10-22 NOTE — DISCHARGE NOTE PROVIDER - NSDCCPCAREPLAN_GEN_ALL_CORE_FT
PRINCIPAL DISCHARGE DIAGNOSIS  Diagnosis: Weakness  Assessment and Plan of Treatment: You presented with altered mental status and weakness after having a flu shot. We performed number of tests on you to rule out infection. Blood culture, urine culture, COVID and respiratory viral panel came back negative. CT abd/pel w/ iv cont showed Stool distended rectum with circumferential rectal wall thickening. Subtle perirectal fat infiltration. Early developing stercoral colitis cannot be entirely excluded. There is associated fecal stasis throughout the large bowel. A CT head showed no bleed or clot and hence we ruled out a stroke. Physical therapy saw you while you were inpatient and suggested you home with home PT. Please follow up with your outpatient PCP for regular evaluation. Also get Vit B12 and TFT levels done outpatient. Please continue taking your home medications and return to the hospital if you have any new symtoms like light headedness, loss of consciousness, worsening weakness or altered mental status. You can take over the counter miralax for constipation when needed and multvitamins.

## 2021-10-22 NOTE — H&P ADULT - NSHPPHYSICALEXAM_GEN_ALL_CORE
PHYSICAL EXAM:    General: WDWN  HEENT: NC/AT; PERRL, anicteric sclera; MMM  Neck: supple  Cardiovascular: +S1/S2, RRR  Respiratory: CTA B/L; no W/R/R  Gastrointestinal: soft, NT/ND; +BSx4  Extremities: WWP; no edema, clubbing or cyanosis  Vascular: 2+ radial, DP/PT pulses B/L  Neurological: AAOx3; no focal deficits  Psychiatric: pleasant mood and affect  Dermatologic: no appreciable wounds or damage to the skin PHYSICAL EXAM:    General: disheveled, NAD  HEENT: NC/AT; PERRL, anicteric sclera; MMM  Neck: supple  Cardiovascular: heart sounds distant, +S1/S2, RRR  Respiratory: CTA B/L; no W/R/R  Gastrointestinal: soft, NT/ND; +BSx4  Extremities: WWP; no edema, clubbing or cyanosis  Vascular: 2+ radial, DP/PT pulses B/L  Neurological: AAOx1 (self); no focal deficits  Psychiatric: pleasant mood and affect, tangential   Dermatologic: no appreciable wounds or damage to the skin

## 2021-10-22 NOTE — H&P ADULT - HISTORY OF PRESENT ILLNESS
pt is an 80yo M w/ history of htn, high cholesterol, prostate cancer, here with spouse for generalized weakness, nausea/vomiting, lethargy/fatigue. Wife reports they went for flu shot at noon. About 2 hours after, started to feel sick/ weak. Nauseous and vomited x1. Wanted to go to the bathroom for bm but unable to walk there on own. Used walker, sat on toilet. Had formed bm but then too weak to get up. Wife helped him to floor. No syncope/loc. Says currently feels a little nauseous, weak. Denies cough, chest pain, abdominal pain, sick contacts.    Initial vital signs: T: 98.5F, HR: 98, BP: 122/78, R: 18, SpO2: 94% on RA  Labs: significant for wbc wnl, hg 12.7, plt 169, coags wnl, lytes wnl, BUN/Cr 24/1.38-->22/1.32, lactate 3.4-->3.0  Imaging: CT abd/pel w/ iv cont:Stool distended rectum with circumferential rectal wall thickening. Subtle perirectal fat infiltration. Early developing stercoral colitis cannot be entirely excluded. There is associated fecal stasis throughout the large bowel. Findings consistent with fecal impaction.  CXR: unremarkable   EKG: sinus tachycardia   Medications: 2.2L NS, vanc 1g, zosyn 3.375mg, zofran 4mg. tylenol 650mg   Consults: none     after fluid resuscitation and large BM patient states he is feeling a lot better and now mentating at baseline. patient admitted for continued monitoring overnight with plan for DC in AM   pt is an 80yo M w/ history of htn, high cholesterol, prostate cancer, here with spouse for generalized weakness, nausea/vomiting, lethargy/fatigue. Wife reports they went for flu shot at noon. About 2 hours after, started to feel sick/ weak. Nauseous and vomited x1. Wanted to go to the bathroom for bm but unable to walk there on own. Used walker, sat on toilet. Had formed bm but then too weak to get up. Wife helped him to floor. No syncope/loc. Says currently feels a little nauseous, weak. Denies cough, chest pain, abdominal pain, sick contacts.    Initial vital signs: T: 98.5F, HR: 98, BP: 122/78, R: 18, SpO2: 94% on RA  Labs: significant for wbc wnl, hg 12.7, plt 169, coags wnl, lytes wnl, BUN/Cr 24/1.38-->22/1.32, lactate 3.4-->3.0  Imaging: CT abd/pel w/ iv cont:Stool distended rectum with circumferential rectal wall thickening. Subtle perirectal fat infiltration. Early developing stercoral colitis cannot be entirely excluded. There is associated fecal stasis throughout the large bowel. Findings consistent with fecal impaction.  CXR: unremarkable   EKG: sinus tachycardia   Medications: 2.2L NS, vanc 1g, zosyn 3.375mg, zofran 4mg. tylenol 650mg   Consults: none     after fluid resuscitation and large BM patient states he is feeling a lot better and mentating at baseline. patient admitted for continued monitoring overnight with plan for DC in AM   pt is an 80yo M w/ history of htn, high cholesterol, prostate cancer, here with spouse for generalized weakness, nausea/vomiting, lethargy/fatigue. Wife reports they went for flu shot at noon. About 2 hours after, started to feel sick/ weak. Nauseous and vomited x1. Wanted to go to the bathroom for bm but unable to walk there on own. Used walker, sat on toilet. Had formed bm but then too weak to get up. Wife helped him to floor. No syncope/loc. Says currently feels a little nauseous, weak. Denies cough, chest pain, abdominal pain, sick contacts.    Initial vital signs: T: 98.5F, HR: 98, BP: 122/78, R: 18, SpO2: 94% on RA  Labs: significant for wbc wnl, hg 12.7, plt 169, coags wnl, lytes wnl, BUN/Cr 24/1.38-->22/1.32, lactate 3.4-->3.0  Imaging: CT abd/pel w/ iv cont:Stool distended rectum with circumferential rectal wall thickening. Subtle perirectal fat infiltration. Early developing stercoral colitis cannot be entirely excluded. There is associated fecal stasis throughout the large bowel. Findings consistent with fecal impaction.  CXR: unremarkable   EKG: sinus tachycardia   Medications: 2.2L NS, vanc 1g, zosyn 3.375mg, zofran 4mg. tylenol 650mg, 40meq K+ powder   Consults: none     after fluid resuscitation and large BM patient states he is feeling a lot better and mentating at baseline. patient admitted for continued monitoring overnight with plan for DC in AM   pt is an 80yo M w/ history of high cholesterol, prostate cancer p/w 1 day of generalized weakness, nausea/vomiting, AMS. Wife reports they went for flu shot at noon. About 2 hours after, started to feel sick/ weak. Nauseous and vomited x1 (NBNB). Wanted to go to the bathroom for bm but unable to walk there on own. Used walker, sat on toilet. Had formed bm but then too weak to get up. Wife helped him to floor. No syncope/loc. ROS + for nausea and weakness. Denies cough, chest pain, abdominal pain, sick contacts.    Initial vital signs: T: 98.5F, HR: 98, BP: 122/78, R: 18, SpO2: 94% on RA  Labs: significant for wbc wnl, hg 12.7, plt 169, coags wnl, lytes wnl, BUN/Cr 24/1.38-->22/1.32, lactate 3.4-->3.0  Imaging: CT abd/pel w/ iv cont:Stool distended rectum with circumferential rectal wall thickening. Subtle perirectal fat infiltration. Early developing stercoral colitis cannot be entirely excluded. There is associated fecal stasis throughout the large bowel. Findings consistent with fecal impaction.  CXR: unremarkable   EKG: sinus tachycardia   Medications: 2.2L NS, vanc 1g, zosyn 3.375mg, zofran 4mg. tylenol 650mg, 40meq K+ powder   Consults: none     after fluid resuscitation and large BM patient states he is feeling a lot better and mentating at baseline. patient admitted for continued monitoring overnight with plan for DC in AM   pt is an 82yo M w/ history of high cholesterol, prostate cancer p/w 1 day of generalized weakness, nausea/vomiting, AMS. Wife reports they went for flu shot at noon. About 2 hours after, started to feel sick/ weak. Nauseous and vomited x1 (NBNB). Wanted to go to the bathroom for bm but unable to walk there on own. Used walker, sat on toilet. Had formed bm but then too weak to get up. Wife helped him to floor. No syncope/loc. ROS + for nausea and weakness. Denies cough, chest pain, abdominal pain, sick contacts.    Initial vital signs: T: 98.5F, HR: 98, BP: 122/78, R: 18, SpO2: 94% on RA  Labs: significant for wbc wnl, hg 12.7, plt 169, coags wnl, lytes wnl, BUN/Cr 24/1.38-->22/1.32, lactate 3.4-->3.0  Imaging: CT abd/pel w/ iv cont:Stool distended rectum with circumferential rectal wall thickening. Subtle perirectal fat infiltration. Early developing stercoral colitis cannot be entirely excluded. There is associated fecal stasis throughout the large bowel. Findings consistent with fecal impaction.  CXR: unremarkable   EKG: sinus tachycardia   Medications: 2.2L NS, vanc 1g, zosyn 3.375mg, zofran 4mg. tylenol 650mg, 40meq K+ powder   Consults: none     after fluid resuscitation and large BM patient states he is feeling a lot better. patient admitted for continued monitoring overnight  pt is an 82yo M w/ history of high cholesterol, prostate cancer p/w 1 day of generalized weakness, nausea/vomiting, AMS. Wife reports they went for flu shot at noon. About 2 hours after, started to feel sick/ weak. Nauseous and vomited x1 (NBNB). Wanted to go to the bathroom for bm but unable to walk there on own. Used walker, sat on toilet. Had formed bm but then too weak to get up. Wife helped him to floor. No syncope/loc. ROS + for nausea and weakness. Denies cough, chest pain, abdominal pain, sick contacts.  -s/p covid vaccinated x2 + booster     Initial vital signs: T: 98.5F, HR: 98, BP: 122/78, R: 18, SpO2: 94% on RA  Labs: significant for wbc wnl, hg 12.7, plt 169, coags wnl, lytes wnl, BUN/Cr 24/1.38-->22/1.32, lactate 3.4-->3.0  Imaging: CT abd/pel w/ iv cont:Stool distended rectum with circumferential rectal wall thickening. Subtle perirectal fat infiltration. Early developing stercoral colitis cannot be entirely excluded. There is associated fecal stasis throughout the large bowel. Findings consistent with fecal impaction.  CXR: unremarkable   EKG: sinus tachycardia   Medications: 2.2L NS, vanc 1g, zosyn 3.375mg, zofran 4mg. tylenol 650mg, 40meq K+ powder   Consults: none     after fluid resuscitation and large BM patient states he is feeling a lot better. patient admitted for continued monitoring overnight

## 2021-10-22 NOTE — PROGRESS NOTE ADULT - PROBLEM SELECTOR PLAN 7
Fluids: none  Electrolytes: Mg>2, K>4  Nutrition:  regular diet   Prophylaxis: lovenox   Activity: AAT, OOBTC  GI: none  C: FC  Dispo: Admit to [facility]

## 2021-10-22 NOTE — PHYSICAL THERAPY INITIAL EVALUATION ADULT - GENERAL OBSERVATIONS, REHAB EVAL
PT IE completed. Patient received semi supine in bed +heplock IV, NAD, +reports to be feeling much improved from previous day, willing to work with PT.

## 2021-10-22 NOTE — H&P ADULT - PROBLEM SELECTOR PLAN 6
Fluids: 100cc NS/10 hours   Electrolytes: Mg>2, K>4  Nutrition:  No IVF currently needed, replete lytes PRN  Prophylaxis: lovenox   Activity: AAT, OOBTC  GI: none  C: FC  Dispo: Admit to [facility] hx of hyperlipidemia on atorvastatin 40mg qd  -c/w home med

## 2021-10-22 NOTE — H&P ADULT - PROBLEM SELECTOR PLAN 5
hx of hyperlipidemia on atorvastatin 40mg qd  -c/w home med #Constipation  found on CT s/p BM in ED  -c/w senna miralax

## 2021-10-22 NOTE — PHYSICAL THERAPY INITIAL EVALUATION ADULT - DISCHARGE DISPOSITION, PT EVAL
and continued assist from wife at home. Of note, patient has use of RW, SC, and quad cane for use when ambulating. Discussed with SAMIR Pedraza/home w/ home PT

## 2021-10-22 NOTE — PHYSICAL THERAPY INITIAL EVALUATION ADULT - GAIT DEVIATIONS NOTED, PT EVAL
Addendum  created 05/20/21 1519 by Adele Lawler MD    Clinical Note Signed      
Demo slightly unsteady gait pattern without use of AD, however, no LOB observed and good navigation of hallway obstacles. Benefits from increased time to perform ambulation trial 2/2 impaired balance./decreased vero/decreased velocity of limb motion/decreased step length/decreased stride length/decreased weight-shifting ability

## 2021-10-22 NOTE — CONSULT NOTE ADULT - SUBJECTIVE AND OBJECTIVE BOX
Patient is a 81y old  Male who presents with a chief complaint of weakness (22 Oct 2021 08:32)       HPI:  pt is an 80yo M w/ history of high cholesterol, prostate cancer p/w 1 day of generalized weakness, nausea/vomiting, AMS. Wife reports they went for flu shot at noon. About 2 hours after, started to feel sick/ weak. Nauseous and vomited x1 (NBNB). Wanted to go to the bathroom for bm but unable to walk there on own. Used walker, sat on toilet. Had formed bm but then too weak to get up. Wife helped him to floor. No syncope/loc. ROS + for nausea and weakness. Denies cough, chest pain, abdominal pain, sick contacts.  -s/p covid vaccinated x2 + booster     Initial vital signs: T: 98.5F, HR: 98, BP: 122/78, R: 18, SpO2: 94% on RA  Labs: significant for wbc wnl, hg 12.7, plt 169, coags wnl, lytes wnl, BUN/Cr 24/1.38-->22/1.32, lactate 3.4-->3.0  Imaging: CT abd/pel w/ iv cont:Stool distended rectum with circumferential rectal wall thickening. Subtle perirectal fat infiltration. Early developing stercoral colitis cannot be entirely excluded. There is associated fecal stasis throughout the large bowel. Findings consistent with fecal impaction.  CXR: unremarkable   EKG: sinus tachycardia   Medications: 2.2L NS, vanc 1g, zosyn 3.375mg, zofran 4mg. tylenol 650mg, 40meq K+ powder   Consults: none     after fluid resuscitation and large BM patient states he is feeling a lot better. patient admitted for continued monitoring overnight  (22 Oct 2021 04:03)      PAST MEDICAL & SURGICAL HISTORY:  Hyperlipemia    Low potassium syndrome    CA of prostate    H/O eye surgery  6 surgeries of L eye        MEDICATIONS  (STANDING):  atorvastatin 40 milliGRAM(s) Oral at bedtime  enoxaparin Injectable 40 milliGRAM(s) SubCutaneous every 24 hours  pantoprazole    Tablet 40 milliGRAM(s) Oral before breakfast  polyethylene glycol 3350 17 Gram(s) Oral every 24 hours  senna 2 Tablet(s) Oral at bedtime    MEDICATIONS  (PRN):  acetaminophen     Tablet .. 650 milliGRAM(s) Oral every 6 hours PRN Temp greater or equal to 38C (100.4F), Mild Pain (1 - 3)  aluminum hydroxide/magnesium hydroxide/simethicone Suspension 30 milliLiter(s) Oral every 6 hours PRN Dyspepsia  melatonin 3 milliGRAM(s) Oral at bedtime PRN Insomnia  ondansetron Injectable 4 milliGRAM(s) IV Push every 8 hours PRN Nausea and/or Vomiting      FAMILY HISTORY:      CBC Full  -  ( 21 Oct 2021 19:58 )  WBC Count : 8.97 K/uL  RBC Count : 4.04 M/uL  Hemoglobin : 12.7 g/dL  Hematocrit : 38.0 %  Platelet Count - Automated : 169 K/uL  Mean Cell Volume : 94.1 fl  Mean Cell Hemoglobin : 31.4 pg  Mean Cell Hemoglobin Concentration : 33.4 gm/dL  Auto Neutrophil # : 8.58 K/uL  Auto Lymphocyte # : 0.16 K/uL  Auto Monocyte # : 0.23 K/uL  Auto Eosinophil # : 0.00 K/uL  Auto Basophil # : 0.00 K/uL  Auto Neutrophil % : 94.7 %  Auto Lymphocyte % : 1.8 %  Auto Monocyte % : 2.6 %  Auto Eosinophil % : 0.0 %  Auto Basophil % : 0.0 %      10-21    142  |  107  |  22  ----------------------------<  109<H>  3.6   |  24  |  1.32<H>    Ca    8.9      21 Oct 2021 21:31    TPro  6.0  /  Alb  3.4  /  TBili  0.6  /  DBili  x   /  AST  17  /  ALT  12  /  AlkPhos  59  10-21      Urinalysis Basic - ( 21 Oct 2021 21:12 )    Color: Yellow / Appearance: Clear / S.020 / pH: x  Gluc: x / Ketone: NEGATIVE  / Bili: Negative / Urobili: 1.0 E.U./dL   Blood: x / Protein: Trace mg/dL / Nitrite: NEGATIVE   Leuk Esterase: NEGATIVE / RBC: < 5 /HPF / WBC < 5 /HPF   Sq Epi: x / Non Sq Epi: 0-1 /HPF / Bacteria: Present /HPF          Radiology:    < from: Xray Chest 1 View- PORTABLE-Urgent (10.21.21 @ 21:04) >    EXAM:  XR CHEST PORTABLE URGENT 1V                          PROCEDURE DATE:  10/21/2021          INTERPRETATION:  TECHNIQUE: Single portable view of the chest.    COMPARISON:  3/12/2017    CLINICAL HISTORY: Sepsis    FINDINGS:    Single frontal view of the chest demonstrates the lungs to be clear. The cardiomediastinal silhouette is normal. No acute osseous abnormalities. No pneumonia.    IMPRESSION: No acute cardiopulmonary disease process.        < from: CT Abdomen and Pelvis w/ IV Cont (10.22.21 @ 00:25) >  EXAM:  CT ABDOMEN AND PELVIS IC                          PROCEDURE DATE:  10/22/2021          INTERPRETATION:  CT SCAN OF ABDOMEN AND PELVIS    History: Fever and vomiting.    Technique: CT scan of abdomen and pelvis was performed from lung bases through symphysis pubis. Intravenous and oral contrast material were utilized. Axial, sagittal and coronal reformatted images were reviewed.    Comparison: None.    Findings:    Lower chest: Normal.    Liver:  Normal.    Gallbladder: No radiopaque stones gallbladder.    Spleen:  Normal.    Pancreas:  Atrophic.    Adrenal glands:  Normal.    Kidneys: Normal.    Adenopathy:  No lymphadenopathy in abdomen or pelvis.    Ascites: None.    Gastrointestinal tract: Stool distended rectum with circumferential rectal wall thickening. Subtle perirectal fat infiltration. Early developing stercoral colitis cannot be entirely excluded. There is associated fecal stasis throughout the large bowel. No pneumoperitoneum or ascites. The appendix is not visualized.    Vessels: Moderate soft and calcified plaque noted within the aorta and its main branches. Mild falciform dilatation of the mid celiac axis measuring up to 1.1 cm.    Pelvic organs: Brachytherapy seeds noted in the prostate. Prostatomegaly.    Softtissues: Bilateral small fat-containing inguinal hernias.    Bones: Mild levocurvature of the lumbar spine. Moderate degenerative changes spine.    Impression:  Stool distended rectum with circumferential rectal wall thickening. Subtle perirectal fatinfiltration. Early developing stercoral colitis cannot be entirely excluded. There is associated fecal stasis throughout the large bowel. Findings consistent with fecal impaction.              Vital Signs Last 24 Hrs  T(C): 37.1 (22 Oct 2021 04:47), Max: 37.2 (22 Oct 2021 01:00)  T(F): 98.7 (22 Oct 2021 04:47), Max: 98.9 (22 Oct 2021 01:00)  HR: 77 (22 Oct 2021 04:47) (77 - 98)  BP: 107/65 (22 Oct 2021 04:47) (98/60 - 122/78)  BP(mean): --  RR: 18 (22 Oct 2021 04:47) (18 - 18)  SpO2: 99% (22 Oct 2021 04:47) (94% - 99%)        REVIEW OF SYSTEMS:    CONSTITUTIONAL: as per HPI  EYES: No eye pain, visual disturbances, or discharge  ENMT:  No difficulty hearing, tinnitus, vertigo; No sinus or throat pain  NECK: No pain or stiffness  BREASTS: No pain, masses, or nipple discharge  RESPIRATORY: No cough, wheezing, chills or hemoptysis; No shortness of breath  CARDIOVASCULAR: No chest pain, palpitations, dizziness, or leg swelling  GASTROINTESTINAL: No abdominal or epigastric pain. No nausea, vomiting, or hematemesis; No diarrhea or constipation. No melena or hematochezia.  GENITOURINARY: No dysuria, frequency, hematuria, or incontinence  NEUROLOGICAL: No headaches, memory loss, loss of strength, numbness, or tremors  SKIN: No itching, burning, rashes, or lesions   LYMPH NODES: No enlarged glands  ENDOCRINE: No heat or cold intolerance; No hair loss  MUSCULOSKELETAL: No joint pain or swelling; No muscle, back, or extremity pain  PSYCHIATRIC: No depression, anxiety, mood swings, or difficulty sleeping  HEME/LYMPH: No easy bruising, or bleeding gums  ALLERGY AND IMMUNOLOGIC: No hives or eczema  VASCULAR: no swelling, erythema,           Physical Exam:   80 yo  gentleman lying in bed, awake, confused, NAD    Head: normocephalic, atraumatic    Eyes: PERRLA, EOMI, no nystagmus, sclera anicteric    ENT: nasal discharge, uvula midline, no oropharyngeal erythema/exudate    Neck: supple, negative JVD, negative carotid bruits, no thyromegaly    Chest: CTA bilaterally, neg wheeze/ rhonchi/ rales/ crackles/ egophany    Cardiovascular: regular rate and rhythm, neg murmurs/rubs/gallops    Abdomen: soft, non distended, non tender to palpation in all 4 quadrants, negative rebound/guarding, normal bowel sounds    Extremities: WWP, neg cyanosis/clubbing/edema, negative calf tenderness to palpation, negative Christal's sign    Neurologic Exam:    Alert and oriented to person,  speech fluent w/o dysarthria, follows commands    Cranial Nerves:     II:                         pupils equal, round and reactive to light, visual fields intact   III/ IV/VI:               extraocular movements intact, neg nystagmus, neg ptosis  V:                        facial sensation intact, V1-3 normal  VII:                      face symmetric, no droop, normal eye closure and smile  VIII:                     hearing intact to finger rub bilaterally  IX and X:             no hoarseness, gag intact, palate/ uvula rise symmetrically  XI:                       SCM/ trapezius strength intact bilateral  XII:                      no tongue deviation    Motor Exam:    Right UE:              suboptimal effort > 3+/5    Left UE:                suboptimal effort > 3+/5      Right LE:              suboptimal effort > 3+/5    Left LE:                suboptimal effort > 3+/5               Sensation:           intact to light touch x 4 extremities                                                 DTR:                  biceps/brachioradialis: equal                                                   patella/ankle: equal                                                   neg clonus                           neg Babinski              Gait:  not tested        PM&R Impression:    1) deconditioned  2) no focal weakness    Recommendations/ Plan :    1) Physical therapy focusing on therapeutic exercises, bed mobility/transfer out of bed evaluation, progressive ambulation with assistive devices prn.    2) Anticipated Disposition Plan/Recs:    pending functional progress

## 2021-10-22 NOTE — H&P ADULT - NSHPSOCIALHISTORY_GEN_ALL_CORE
Work:  Tobacco use:   EtOH use:  Illicit drug use:    Living situation: Work:x  Tobacco use: in 2003 smoked up to 60 cigs/day. now down to 1-2 every few days  EtOH use: occasional  Illicit drug use:x    Living situation:with wife

## 2021-10-22 NOTE — H&P ADULT - NSICDXPASTSURGICALHX_GEN_ALL_CORE_FT
PAST SURGICAL HISTORY:  No significant past surgical history      PAST SURGICAL HISTORY:  H/O eye surgery 6 surgeries of L eye

## 2021-10-22 NOTE — H&P ADULT - NSHPLABSRESULTS_GEN_ALL_CORE
.  LABS:                         12.7   8.97  )-----------( 169      ( 21 Oct 2021 19:58 )             38.0     10-21    142  |  107  |  22  ----------------------------<  109<H>  3.6   |  24  |  1.32<H>    Ca    8.9      21 Oct 2021 21:31    TPro  6.0  /  Alb  3.4  /  TBili  0.6  /  DBili  x   /  AST  17  /  ALT  12  /  AlkPhos  59  10-21    PT/INR - ( 21 Oct 2021 19:57 )   PT: 13.0 sec;   INR: 1.09          PTT - ( 21 Oct 2021 19:57 )  PTT:30.8 sec  Urinalysis Basic - ( 21 Oct 2021 21:12 )    Color: Yellow / Appearance: Clear / S.020 / pH: x  Gluc: x / Ketone: NEGATIVE  / Bili: Negative / Urobili: 1.0 E.U./dL   Blood: x / Protein: Trace mg/dL / Nitrite: NEGATIVE   Leuk Esterase: NEGATIVE / RBC: < 5 /HPF / WBC < 5 /HPF   Sq Epi: x / Non Sq Epi: 0-1 /HPF / Bacteria: Present /HPF        Lactate, Blood: 3.0 mmol/L (10-21 @ 21:27)  Lactate, Blood: 3.4 mmol/L (10-21 @ 19:57)      RADIOLOGY, EKG & ADDITIONAL TESTS: Reviewed.

## 2021-10-22 NOTE — PHYSICAL THERAPY INITIAL EVALUATION ADULT - PERTINENT HX OF CURRENT PROBLEM, REHAB EVAL
pt is a 80yo M w/ history of high cholesterol, prostate cancer p/w 1 day of generalized weakness, nausea/vomiting, AMS admitted for w/up and monitoring

## 2021-10-22 NOTE — H&P ADULT - PROBLEM SELECTOR PLAN 1
patient pt w/ generalized weakness after flu vaccination. improved post 2.2L NS fluid resuscitation. tx empirically w vanc and zosyn in the ED. infectious w/u unremarkable. meeting 1/4 SIRS for HR in 90s  -will hold off on further abx for now   -f/u blood cultures   -PT consult

## 2021-10-22 NOTE — PROGRESS NOTE ADULT - PROBLEM SELECTOR PLAN 5
-CT abd/pel w/ iv cont: Stool distended rectum with circumferential rectal wall thickening. Subtle perirectal fat infiltration.  -had 1 BM in ED and one on 10/22 @am    To do:  -c/w senjosesito inmanalax

## 2021-10-23 LAB
CULTURE RESULTS: NO GROWTH — SIGNIFICANT CHANGE UP
SPECIMEN SOURCE: SIGNIFICANT CHANGE UP

## 2021-10-26 LAB
CULTURE RESULTS: SIGNIFICANT CHANGE UP
CULTURE RESULTS: SIGNIFICANT CHANGE UP
SPECIMEN SOURCE: SIGNIFICANT CHANGE UP
SPECIMEN SOURCE: SIGNIFICANT CHANGE UP

## 2021-10-29 DIAGNOSIS — R53.1 WEAKNESS: ICD-10-CM

## 2021-10-29 DIAGNOSIS — R41.82 ALTERED MENTAL STATUS, UNSPECIFIED: ICD-10-CM

## 2021-10-29 DIAGNOSIS — Z87.891 PERSONAL HISTORY OF NICOTINE DEPENDENCE: ICD-10-CM

## 2021-10-29 DIAGNOSIS — K59.00 CONSTIPATION, UNSPECIFIED: ICD-10-CM

## 2021-10-29 DIAGNOSIS — E78.5 HYPERLIPIDEMIA, UNSPECIFIED: ICD-10-CM

## 2021-10-29 DIAGNOSIS — R11.2 NAUSEA WITH VOMITING, UNSPECIFIED: ICD-10-CM

## 2021-10-29 DIAGNOSIS — N18.30 CHRONIC KIDNEY DISEASE, STAGE 3 UNSPECIFIED: ICD-10-CM

## 2021-10-29 DIAGNOSIS — I12.9 HYPERTENSIVE CHRONIC KIDNEY DISEASE WITH STAGE 1 THROUGH STAGE 4 CHRONIC KIDNEY DISEASE, OR UNSPECIFIED CHRONIC KIDNEY DISEASE: ICD-10-CM

## 2021-11-04 PROBLEM — C61 MALIGNANT NEOPLASM OF PROSTATE: Chronic | Status: ACTIVE | Noted: 2021-10-22

## 2021-12-19 ENCOUNTER — RX RENEWAL (OUTPATIENT)
Age: 81
End: 2021-12-19

## 2021-12-19 RX ORDER — OMEPRAZOLE 20 MG/1
20 CAPSULE, DELAYED RELEASE ORAL
Qty: 90 | Refills: 3 | Status: ACTIVE | COMMUNITY
Start: 2021-12-19 | End: 1900-01-01

## 2022-01-05 ENCOUNTER — APPOINTMENT (OUTPATIENT)
Dept: NEPHROLOGY | Facility: CLINIC | Age: 82
End: 2022-01-05
Payer: MEDICARE

## 2022-01-05 ENCOUNTER — LABORATORY RESULT (OUTPATIENT)
Age: 82
End: 2022-01-05

## 2022-01-05 VITALS — SYSTOLIC BLOOD PRESSURE: 114 MMHG | HEART RATE: 70 BPM | DIASTOLIC BLOOD PRESSURE: 62 MMHG

## 2022-01-05 VITALS — TEMPERATURE: 98.4 F

## 2022-01-05 DIAGNOSIS — E87.6 HYPOKALEMIA: ICD-10-CM

## 2022-01-05 DIAGNOSIS — R79.89 OTHER SPECIFIED ABNORMAL FINDINGS OF BLOOD CHEMISTRY: ICD-10-CM

## 2022-01-05 PROCEDURE — 99214 OFFICE O/P EST MOD 30 MIN: CPT | Mod: 25

## 2022-01-05 PROCEDURE — 36415 COLL VENOUS BLD VENIPUNCTURE: CPT

## 2022-01-05 NOTE — ASSESSMENT
[FreeTextEntry1] : # HTN controlled.\par * Cont amlodipine, lisinopril.\par * The patient's blood pressure was checked with the [employer] [id number]-[id number] using the SPRINT trial protocol after sitting quietly in an empty room with arm supported, back supported, and feet on the floor for 5 minutes. The average of 3 readings were taken.\par * A counseling information sheet has been given (currently or previously, in-person or electronically). All their questions were answered.\par * The patient has been counseled to check their BP at home with an automatic arm cuff, write down the readings, and reach me directly on the phone immediately if they are persistently > 180 systolic or if SBP is less than 100 or if lightheadedness develops. They were counseled to bring in all blood pressure readings and medications next visit.\par * The patient has been counseled that regular office followup (at least every 4 months for now) is important for monitoring and for their health, and that it is their responsibility to make follow up appointments.\par * The patient also has been counseled that they must never stop or change any medications without discussing this with me (or another physician). \par \par # CKD stage 3.\par * Recheck labs.\par * Therapies for kidney disease: blood pressure control; proteinuria reduction with ARB/ACEi; other evidence-based therapies including exercise, a plant-based lower oxalate diet, and 400 mcg folic acid daily\par * Cardiovascular disease prevention: counseling on healthy diet, physical activity, weight loss, alcohol limitation, blood pressure control\par * The patient has been counseled that chronic kidney disease is a significant condition and regular office followup with me (at least every 4 months for now) is important for monitoring and their health, and that it is their responsibility to make a follow up appointment.\par * The patient has been counseled never to stop taking their medications without discussing it with me or another doctor.\par * The patient has been counseled on avoiding NSAIDs.\par * The patient has been counseled on risk of acute renal failure and instructed to immediately call and speak with me or go immediately to ER with any severe symptoms, nausea, vomiting, diarrhea, chest pain, or shortness of breath.\par * A counseling information sheet has been given (today or previously). All their questions were answered.\par \par # Smoking.\par * Cessation.\par * Last CT age 80 per guidelines. \par \par # GERD.\par * Follow up with GI.\par \par # Oral infections.\par * Advised to see dentist.\par \par # Skin lesion.\par * Surgery followup. They were instructed that this referral is important for their health and that it is their responsibility to make and keep this appointment. All their questions were answered. \par \par # Unsteadiness and right finger numbness.\par * Neuro referral advised. They were instructed that this referral is important for their health and that it is their responsibility to make and keep this appointment. All their questions were answered. \par \par  \par

## 2022-01-05 NOTE — HISTORY OF PRESENT ILLNESS
[FreeTextEntry1] : Self-referred for general medical care. He was previously a patient of Dr. Diane. (The patient has been advised to call their office and arrange copies of notes/labs to be sent.)\par \par * Wife in hospital with stroke. * CKD stable, creatinine 1.38. * Doesn’t check BP at home. No lightheadedness. Compliant with medications. * Smoking 1 - 2 cigarettes weekly. * Unchanged finger numbness and unsteadiness for "years". * Developed fever 3 hours after flu shot. Evaluated in ED. Covid negative. Determined to be reaction to flu shot. \par \par Previous history ([id number]): * Doesn’t check BP at home.  No lightheadedness. Compliant with medications. * CKD previously stable, creatinine 1.34. * \par \par Previous history (72Ptd17): * HTN controlled. * CKD stable, creatinine 1.34. * Borderline DM, HGA1c 5.8. * Smoking 3 cigarettes weekly. * Borderline high ferritin. \par \par Previous history (11Qlk45): He is slightly unsteady and walks with a cane. No lightheadedness. HTN controlled. He doesn't check BP at home. \par \par He has had stable elevated creatinine of approximately 1.4 for at least 40 years. He uses aleve about once monthly. \par \par His wife says he has fatigue, cold intolerance, and difficulty sleeping.\par \par Fatigue, cold intolerance, difficulty sleeping. \par \par Occasional numbness on first three fingers of right hand. \par \par He occasionally has oral infections and self-treats with azithromycin. \par \par Grown on back of neck is being followed by dermatology. This has been present for years and he has not been told this is malignant. \par \par He has smoked 30 years 3 packs daily and currently smoke 1 cigarette daily. Not interested in quitting. \par \par He has never been evaluated by a cardiologist for > 20 years. \par \par Prostate cancer treated  with radiation seeds. PSA reportedly normal. \par \par Options for clinical preventative services\par \par Covid vaccine February Pfizer 2021; Booster . \par EKG Dec 20 \par Cardiologist Advised to make appointment for routine evaluation [id number]. \par Lung CT Scan for Smokin,   \par Influenza: Sep 2020 , sep 2021 \par Pneumonia: x1. He will obtain. \par Shingles: Advised [id number].  \par TDAP:  \par Colonoscopy: , "i'm not having another one"\par Dermatologist: 2019,

## 2022-01-05 NOTE — PHYSICAL EXAM
[General Appearance - Alert] : alert [General Appearance - In No Acute Distress] : in no acute distress [Neck Appearance] : the appearance of the neck was normal [Neck Cervical Mass (___cm)] : no neck mass was observed [Jugular Venous Distention Increased] : there was no jugular-venous distention [Thyroid Diffuse Enlargement] : the thyroid was not enlarged [Thyroid Nodule] : there were no palpable thyroid nodules [Auscultation Breath Sounds / Voice Sounds] : lungs were clear to auscultation bilaterally [Heart Rate And Rhythm] : heart rate was normal and rhythm regular [Heart Sounds] : normal S1 and S2 [Heart Sounds Gallop] : no gallops [Murmurs] : no murmurs [Heart Sounds Pericardial Friction Rub] : no pericardial rub [Bowel Sounds] : normal bowel sounds [Abdomen Soft] : soft [Abdomen Tenderness] : non-tender [] : no hepato-splenomegaly [Abdomen Mass (___ Cm)] : no abdominal mass palpated [Cervical Lymph Nodes Enlarged Posterior Bilaterally] : posterior cervical [Cervical Lymph Nodes Enlarged Anterior Bilaterally] : anterior cervical [Supraclavicular Lymph Nodes Enlarged Bilaterally] : supraclavicular [Edema] : there was no peripheral edema [FreeTextEntry1] : 2 cm soft rubbery lesion on back of neck c/w lipoma

## 2022-01-10 LAB
25(OH)D3 SERPL-MCNC: 68.2 NG/ML
ALBUMIN SERPL ELPH-MCNC: 4.8 G/DL
ALP BLD-CCNC: 82 U/L
ALT SERPL-CCNC: 15 U/L
ANION GAP SERPL CALC-SCNC: 18 MMOL/L
APPEARANCE: ABNORMAL
AST SERPL-CCNC: 19 U/L
BASOPHILS # BLD AUTO: 0.05 K/UL
BASOPHILS NFR BLD AUTO: 0.7 %
BILIRUB SERPL-MCNC: 0.6 MG/DL
BILIRUBIN URINE: NEGATIVE
BLOOD URINE: NEGATIVE
BUN SERPL-MCNC: 23 MG/DL
CALCIUM SERPL-MCNC: 10.3 MG/DL
CALCIUM SERPL-MCNC: 10.3 MG/DL
CHLORIDE SERPL-SCNC: 102 MMOL/L
CHOLEST SERPL-MCNC: 121 MG/DL
CO2 SERPL-SCNC: 22 MMOL/L
COLOR: YELLOW
CREAT SERPL-MCNC: 1.29 MG/DL
CREAT SPEC-SCNC: 183 MG/DL
CREAT SPEC-SCNC: 183 MG/DL
CREAT/PROT UR: 0.2 RATIO
CYSTATIN C SERPL-MCNC: 1.65 MG/L
EOSINOPHIL # BLD AUTO: 0.11 K/UL
EOSINOPHIL NFR BLD AUTO: 1.5 %
ESTIMATED AVERAGE GLUCOSE: 117 MG/DL
FERRITIN SERPL-MCNC: 494 NG/ML
GFR/BSA.PRED SERPLBLD CYS-BASED-ARV: 37 ML/MIN
GLUCOSE QUALITATIVE U: NEGATIVE
GLUCOSE SERPL-MCNC: 104 MG/DL
HBA1C MFR BLD HPLC: 5.7 %
HCT VFR BLD CALC: 42.1 %
HDLC SERPL-MCNC: 50 MG/DL
HGB BLD-MCNC: 13.4 G/DL
IMM GRANULOCYTES NFR BLD AUTO: 0.3 %
KETONES URINE: NEGATIVE
LDLC SERPL CALC-MCNC: 55 MG/DL
LEUKOCYTE ESTERASE URINE: NEGATIVE
LYMPHOCYTES # BLD AUTO: 1.93 K/UL
LYMPHOCYTES NFR BLD AUTO: 26 %
MAGNESIUM SERPL-MCNC: 2.4 MG/DL
MAN DIFF?: NORMAL
MCHC RBC-ENTMCNC: 31 PG
MCHC RBC-ENTMCNC: 31.8 GM/DL
MCV RBC AUTO: 97.5 FL
MICROALBUMIN 24H UR DL<=1MG/L-MCNC: 3.6 MG/DL
MICROALBUMIN/CREAT 24H UR-RTO: 20 MG/G
MONOCYTES # BLD AUTO: 0.67 K/UL
MONOCYTES NFR BLD AUTO: 9 %
NEUTROPHILS # BLD AUTO: 4.64 K/UL
NEUTROPHILS NFR BLD AUTO: 62.5 %
NITRITE URINE: NEGATIVE
NONHDLC SERPL-MCNC: 70 MG/DL
PARATHYROID HORMONE INTACT: 45 PG/ML
PH URINE: 5.5
PHOSPHATE SERPL-MCNC: 3.2 MG/DL
PLATELET # BLD AUTO: 230 K/UL
POTASSIUM SERPL-SCNC: 4.4 MMOL/L
PROT SERPL-MCNC: 7.5 G/DL
PROT UR-MCNC: 32 MG/DL
PROTEIN URINE: NORMAL
RBC # BLD: 4.32 M/UL
RBC # FLD: 13.8 %
SODIUM SERPL-SCNC: 142 MMOL/L
SPECIFIC GRAVITY URINE: 1.02
TRIGL SERPL-MCNC: 77 MG/DL
TSH SERPL-ACNC: 1.29 UIU/ML
UROBILINOGEN URINE: NORMAL
VIT B12 SERPL-MCNC: 606 PG/ML
WBC # FLD AUTO: 7.42 K/UL

## 2022-01-20 ENCOUNTER — RX RENEWAL (OUTPATIENT)
Age: 82
End: 2022-01-20

## 2022-04-08 ENCOUNTER — RX RENEWAL (OUTPATIENT)
Age: 82
End: 2022-04-08

## 2022-05-23 ENCOUNTER — APPOINTMENT (OUTPATIENT)
Dept: NEPHROLOGY | Facility: CLINIC | Age: 82
End: 2022-05-23

## 2022-05-25 ENCOUNTER — APPOINTMENT (OUTPATIENT)
Dept: NEPHROLOGY | Facility: CLINIC | Age: 82
End: 2022-05-25
Payer: MEDICARE

## 2022-05-25 VITALS — DIASTOLIC BLOOD PRESSURE: 59 MMHG | HEART RATE: 70 BPM | SYSTOLIC BLOOD PRESSURE: 108 MMHG

## 2022-05-25 DIAGNOSIS — K21.9 GASTRO-ESOPHAGEAL REFLUX DISEASE W/OUT ESOPHAGITIS: ICD-10-CM

## 2022-05-25 DIAGNOSIS — F17.200 NICOTINE DEPENDENCE, UNSPECIFIED, UNCOMPLICATED: ICD-10-CM

## 2022-05-25 DIAGNOSIS — R79.9 ABNORMAL FINDING OF BLOOD CHEMISTRY, UNSPECIFIED: ICD-10-CM

## 2022-05-25 PROCEDURE — 99214 OFFICE O/P EST MOD 30 MIN: CPT | Mod: 25

## 2022-05-25 PROCEDURE — 36415 COLL VENOUS BLD VENIPUNCTURE: CPT

## 2022-05-25 RX ORDER — AMLODIPINE BESYLATE 2.5 MG/1
2.5 TABLET ORAL DAILY
Qty: 90 | Refills: 3 | Status: DISCONTINUED | COMMUNITY
Start: 2022-04-08 | End: 2022-05-25

## 2022-05-25 RX ORDER — LISINOPRIL 5 MG/1
5 TABLET ORAL DAILY
Qty: 90 | Refills: 3 | Status: DISCONTINUED | COMMUNITY
Start: 2022-01-20 | End: 2022-05-25

## 2022-05-25 NOTE — ASSESSMENT
[FreeTextEntry1] : # HTN controlled. Below target. \par * Stop amlodipine. \par * The patient's blood pressure was checked with the [employer] [id number]-[id number] using the SPRINT trial protocol after sitting quietly in an empty room with arm supported, back supported, and feet on the floor for 5 minutes. The average of 3 readings were taken.\par * A counseling information sheet has been given (currently or previously, in-person or electronically). All their questions were answered.\par * The patient has been counseled to check their BP at home with an automatic arm cuff, write down the readings, and reach me directly on the phone immediately if they are persistently > 180 systolic or if SBP is less than 100 or if lightheadedness develops. They were counseled to bring in all blood pressure readings and medications next visit.\par * The patient has been counseled that regular office followup (at least every 4 months for now) is important for monitoring and for their health, and that it is their responsibility to make follow up appointments.\par * The patient also has been counseled that they must never stop or change any medications without discussing this with me (or another physician). \par \par # CKD stage 3.\par * Recheck labs.\par * Therapies for kidney disease: blood pressure control; proteinuria reduction with ARB/ACEi; other evidence-based therapies including exercise, a plant-based lower oxalate diet, and 400 mcg folic acid daily\par * Cardiovascular disease prevention: counseling on healthy diet, physical activity, weight loss, alcohol limitation, blood pressure control\par * The patient has been counseled that chronic kidney disease is a significant condition and regular office followup with me (at least every 4 months for now) is important for monitoring and their health, and that it is their responsibility to make a follow up appointment.\par * The patient has been counseled never to stop taking their medications without discussing it with me or another doctor.\par * The patient has been counseled on avoiding NSAIDs.\par * The patient has been counseled on risk of acute renal failure and instructed to immediately call and speak with me or go immediately to ER with any severe symptoms, nausea, vomiting, diarrhea, chest pain, or shortness of breath.\par * A counseling information sheet has been given (today or previously). All their questions were answered.\par \par # Smoking.\par * Cessation.\par \par # GERD.\par * Follow up with GI.\par \par # Oral infections.\par * Advised to see dentist.\par \par # Skin lesion in neck.\par * Advised to sse surgery for followup. They were instructed that this referral is important for their health and that it is their responsibility to make and keep this appointment. All their questions were answered. \par \par # Unsteadiness and right finger numbness.\par * Neuro referral advised. They were instructed that this referral is important for their health and that it is their responsibility to make and keep this appointment. All their questions were answered. \par \par  \par

## 2022-05-25 NOTE — HISTORY OF PRESENT ILLNESS
[FreeTextEntry1] : Self-referred for general medical care. He was previously a patient of Dr. Diane. (The patient has been advised to call their office and arrange copies of notes/labs to be sent.)\par \par * His wife Marco Gonzalez is at home recovering frojm a stroke. * CKD stable, creatinine 1.3. He has had stable elevated creatinine of approximately 1.4 for at least 40 years. * Unchanged finger numbness and unsteadiness for "years". * No smoking.  * BP low at home without lightheadedness. He stopped lisnopril on home. BP still 100s. \par \par Previous history (): * Wife in hospital with stroke. * CKD stable, creatinine 1.38. * Doesn’t check BP at home. No lightheadedness. Compliant with medications. * Smoking 1 - 2 cigarettes weekly. * Unchanged finger numbness and unsteadiness for "years". * Developed fever 3 hours after flu shot. Evaluated in ED. Covid negative. Determined to be reaction to flu shot. \par \par Previous history (): * Doesn’t check BP at home.  No lightheadedness. Compliant with medications. * CKD previously stable, creatinine 1.34. * \par \par Previous history (24Pya77): * HTN controlled. * CKD stable, creatinine 1.34. * Borderline DM, HGA1c 5.8. * Smoking 3 cigarettes weekly. * Borderline high ferritin. \par \par Previous history (99Ehc06): He is slightly unsteady and walks with a cane. No lightheadedness. HTN controlled. He doesn't check BP at home. \par \par He has had stable elevated creatinine of approximately 1.4 for at least 40 years. He uses aleve about once monthly. \par \par His wife says he has fatigue, cold intolerance, and difficulty sleeping.\par \par Fatigue, cold intolerance, difficulty sleeping. \par \par Occasional numbness on first three fingers of right hand. \par \par He occasionally has oral infections and self-treats with azithromycin. \par \par Grown on back of neck is being followed by dermatology. This has been present for years and he has not been told this is malignant. \par \par He has smoked 30 years 3 packs daily and currently smoke 1 cigarette daily. Not interested in quitting. \par \par He has never been evaluated by a cardiologist for > 20 years. \par \par Prostate cancer treated  with radiation seeds. PSA reportedly normal. \par \par Options for clinical preventative services\par \par Covid vaccine February Pfizer 2021; Booster . \par EKG Dec 20 \par Cardiologist Advised to make appointment for routine evaluation [id number]. \par Lung CT Scan for Smokin,   \par Influenza: Sep 2020 , sep 2021 \par Pneumonia: x1. He will obtain. \par Shingles: Advised [id number].  \par TDAP:  \par Colonoscopy: , "i'm not having another one"\par Dermatologist: 2019,

## 2022-05-25 NOTE — PHYSICAL EXAM
[General Appearance - Alert] : alert [General Appearance - In No Acute Distress] : in no acute distress [Neck Appearance] : the appearance of the neck was normal [Neck Cervical Mass (___cm)] : no neck mass was observed [Jugular Venous Distention Increased] : there was no jugular-venous distention [Thyroid Diffuse Enlargement] : the thyroid was not enlarged [Thyroid Nodule] : there were no palpable thyroid nodules [Auscultation Breath Sounds / Voice Sounds] : lungs were clear to auscultation bilaterally [Heart Rate And Rhythm] : heart rate was normal and rhythm regular [Heart Sounds] : normal S1 and S2 [Heart Sounds Gallop] : no gallops [Murmurs] : no murmurs [Heart Sounds Pericardial Friction Rub] : no pericardial rub [Edema] : there was no peripheral edema [Bowel Sounds] : normal bowel sounds [Abdomen Soft] : soft [Abdomen Tenderness] : non-tender [] : no hepato-splenomegaly [Abdomen Mass (___ Cm)] : no abdominal mass palpated [Cervical Lymph Nodes Enlarged Posterior Bilaterally] : posterior cervical [Cervical Lymph Nodes Enlarged Anterior Bilaterally] : anterior cervical [Supraclavicular Lymph Nodes Enlarged Bilaterally] : supraclavicular [FreeTextEntry1] : 2 cm soft rubbery lesion on back of neck c/w lipoma

## 2022-05-28 LAB
25(OH)D3 SERPL-MCNC: 67.5 NG/ML
ALBUMIN SERPL ELPH-MCNC: 4.5 G/DL
ALP BLD-CCNC: 77 U/L
ALT SERPL-CCNC: 16 U/L
ANION GAP SERPL CALC-SCNC: 17 MMOL/L
APPEARANCE: CLEAR
AST SERPL-CCNC: 18 U/L
BASOPHILS # BLD AUTO: 0.05 K/UL
BASOPHILS NFR BLD AUTO: 0.7 %
BILIRUB SERPL-MCNC: 0.5 MG/DL
BILIRUBIN URINE: NEGATIVE
BLOOD URINE: NEGATIVE
BUN SERPL-MCNC: 21 MG/DL
CALCIUM SERPL-MCNC: 9.8 MG/DL
CALCIUM SERPL-MCNC: 9.8 MG/DL
CHLORIDE SERPL-SCNC: 99 MMOL/L
CHOLEST SERPL-MCNC: 104 MG/DL
CO2 SERPL-SCNC: 23 MMOL/L
COLOR: YELLOW
CREAT SERPL-MCNC: 1.25 MG/DL
CREAT SPEC-SCNC: 176 MG/DL
CREAT SPEC-SCNC: 176 MG/DL
CREAT/PROT UR: 0.1 RATIO
CYSTATIN C SERPL-MCNC: 1.6 MG/L
EGFR: 57 ML/MIN/1.73M2
EOSINOPHIL # BLD AUTO: 0.1 K/UL
EOSINOPHIL NFR BLD AUTO: 1.5 %
ESTIMATED AVERAGE GLUCOSE: 120 MG/DL
FERRITIN SERPL-MCNC: 477 NG/ML
GFR/BSA.PRED SERPLBLD CYS-BASED-ARV: 38 ML/MIN/1.73M2
GLUCOSE QUALITATIVE U: NEGATIVE
GLUCOSE SERPL-MCNC: 100 MG/DL
HBA1C MFR BLD HPLC: 5.8 %
HCT VFR BLD CALC: 40.3 %
HDLC SERPL-MCNC: 41 MG/DL
HGB BLD-MCNC: 12.7 G/DL
IMM GRANULOCYTES NFR BLD AUTO: 0.1 %
KETONES URINE: NEGATIVE
LDLC SERPL CALC-MCNC: 44 MG/DL
LEUKOCYTE ESTERASE URINE: NEGATIVE
LYMPHOCYTES # BLD AUTO: 1.83 K/UL
LYMPHOCYTES NFR BLD AUTO: 26.9 %
MAGNESIUM SERPL-MCNC: 2.3 MG/DL
MAN DIFF?: NORMAL
MCHC RBC-ENTMCNC: 30.8 PG
MCHC RBC-ENTMCNC: 31.5 GM/DL
MCV RBC AUTO: 97.8 FL
MICROALBUMIN 24H UR DL<=1MG/L-MCNC: 1.9 MG/DL
MICROALBUMIN/CREAT 24H UR-RTO: 11 MG/G
MONOCYTES # BLD AUTO: 0.55 K/UL
MONOCYTES NFR BLD AUTO: 8.1 %
NEUTROPHILS # BLD AUTO: 4.26 K/UL
NEUTROPHILS NFR BLD AUTO: 62.7 %
NITRITE URINE: NEGATIVE
NONHDLC SERPL-MCNC: 62 MG/DL
PARATHYROID HORMONE INTACT: 72 PG/ML
PH URINE: 6.5
PHOSPHATE SERPL-MCNC: 3.3 MG/DL
PLATELET # BLD AUTO: 204 K/UL
POTASSIUM SERPL-SCNC: 4.5 MMOL/L
PROT SERPL-MCNC: 6.9 G/DL
PROT UR-MCNC: 22 MG/DL
PROTEIN URINE: NORMAL
RBC # BLD: 4.12 M/UL
RBC # FLD: 14 %
SODIUM SERPL-SCNC: 139 MMOL/L
SPECIFIC GRAVITY URINE: 1.02
TRIGL SERPL-MCNC: 89 MG/DL
TSH SERPL-ACNC: 1.69 UIU/ML
UROBILINOGEN URINE: NORMAL
VIT B12 SERPL-MCNC: 882 PG/ML
WBC # FLD AUTO: 6.8 K/UL

## 2022-06-13 ENCOUNTER — RX RENEWAL (OUTPATIENT)
Age: 82
End: 2022-06-13

## 2023-03-20 ENCOUNTER — RX RENEWAL (OUTPATIENT)
Age: 83
End: 2023-03-20

## 2023-07-30 ENCOUNTER — RX RENEWAL (OUTPATIENT)
Age: 83
End: 2023-07-30

## 2023-07-30 RX ORDER — FAMOTIDINE 20 MG/1
20 TABLET, FILM COATED ORAL
Qty: 90 | Refills: 3 | Status: ACTIVE | COMMUNITY
Start: 2020-12-30 | End: 1900-01-01

## 2023-08-15 ENCOUNTER — APPOINTMENT (OUTPATIENT)
Dept: NEPHROLOGY | Facility: CLINIC | Age: 83
End: 2023-08-15
Payer: MEDICARE

## 2023-08-15 VITALS — BODY MASS INDEX: 22.66 KG/M2 | WEIGHT: 149 LBS

## 2023-08-15 DIAGNOSIS — Z79.899 OTHER LONG TERM (CURRENT) DRUG THERAPY: ICD-10-CM

## 2023-08-15 DIAGNOSIS — I10 ESSENTIAL (PRIMARY) HYPERTENSION: ICD-10-CM

## 2023-08-15 PROCEDURE — 36415 COLL VENOUS BLD VENIPUNCTURE: CPT

## 2023-08-15 PROCEDURE — 99214 OFFICE O/P EST MOD 30 MIN: CPT | Mod: 25

## 2023-08-15 NOTE — HISTORY OF PRESENT ILLNESS
[FreeTextEntry1] : Self-referred for general medical care. He was previously a patient of Dr. Diane. (The patient has been advised to call their office and arrange copies of notes/labs to be sent.)  BP controlled off meds. No lightheadedness, lightheadedness, CP, or SOB. * His wife Marco Gonzalez is at home recovering from a stroke. * eGFR 48 ([id number]). *  Previous history (): * His wife Marco Gonzalez is at home recovering frojm a stroke. * CKD stable, creatinine 1.3. He has had stable elevated creatinine of approximately 1.4 for at least 40 years. * Unchanged finger numbness and unsteadiness for "years". * No smoking.  * BP low at home without lightheadedness. He stopped lisnopril on home. BP still 100s.   Previous history (): * Wife in hospital with stroke. * CKD stable, creatinine 1.38. * Doesn't check BP at home. No lightheadedness. Compliant with medications. * Smoking 1 - 2 cigarettes weekly. * Unchanged finger numbness and unsteadiness for "years". * Developed fever 3 hours after flu shot. Evaluated in ED. Covid negative. Determined to be reaction to flu shot.   Previous history (): * Doesn't check BP at home.  No lightheadedness. Compliant with medications. * CKD previously stable, creatinine 1.34. *   Previous history (85Ryj77): * HTN controlled. * CKD stable, creatinine 1.34. * Borderline DM, HGA1c 5.8. * Smoking 3 cigarettes weekly. * Borderline high ferritin.   Previous history (85Xws25): He is slightly unsteady and walks with a cane. No lightheadedness. HTN controlled. He doesn't check BP at home.   He has had stable elevated creatinine of approximately 1.4 for at least 40 years. He uses aleve about once monthly.   His wife says he has fatigue, cold intolerance, and difficulty sleeping.  Fatigue, cold intolerance, difficulty sleeping.   Occasional numbness on first three fingers of right hand.   He occasionally has oral infections and self-treats with azithromycin.   Grown on back of neck is being followed by dermatology. This has been present for years and he has not been told this is malignant.   He has smoked 30 years 3 packs daily and currently smoke 1 cigarette daily. Not interested in quitting.   He has never been evaluated by a cardiologist for > 20 years.   Prostate cancer treated  with radiation seeds. PSA reportedly normal.   Options for clinical preventative services  Covid vaccine February Pfizer 2021; Booster .  EKG Dec 20  Cardiologist Advised to make appointment for routine evaluation [id number].  Lung CT Scan for Smokin2021  Influenza: Sep 2020 , sep 2021  Pneumonia: x1. He will obtain.  Shingles: Advised 50Eqz16.   TDAP:   Colonoscopy: , "i'm not having another one" Dermatologist: 2019,

## 2023-08-15 NOTE — ASSESSMENT
[FreeTextEntry1] : # HTN controlled. Now off meds.  * Stop amlodipine. * The patient's blood pressure was checked with the [employer]ron HEM-907XL using the SPRINT trial protocol after sitting quietly in an empty room with arm supported, back supported, and feet on the floor for 5 minutes. The average of 3 readings were taken. * A counseling information sheet has been given (currently or previously, in-person or electronically). All their questions were answered. * The patient has been counseled to check their BP at home with an automatic arm cuff, write down the readings, and reach me directly on the phone immediately if they are persistently > 180 systolic or if SBP is less than 100 or if lightheadedness develops. They were counseled to bring in all blood pressure readings and medications next visit. * The patient has been counseled that regular office followup (at least every 4 months for now) is important for monitoring and for their health, and that it is their responsibility to make follow up appointments. * The patient also has been counseled that they must never stop or change any medications without discussing this with me (or another physician).  # CKD stage 3. * Recheck labs. * Therapies for kidney disease: blood pressure control; proteinuria reduction with ARB/ACEi; other evidence-based therapies including exercise, a plant-based lower oxalate diet, and 400 mcg folic acid daily * Cardiovascular disease prevention: counseling on healthy diet, physical activity, weight loss, alcohol limitation, blood pressure control * The patient has been counseled that chronic kidney disease is a significant condition and regular office followup with me (at least every 4 months for now) is important for monitoring and their health, and that it is their responsibility to make a follow up appointment. * The patient has been counseled never to stop taking their medications without discussing it with me or another doctor. * The patient has been counseled on avoiding NSAIDs. * The patient has been counseled on risk of acute renal failure and instructed to immediately call and speak with me or go immediately to ER with any severe symptoms, nausea, vomiting, diarrhea, chest pain, or shortness of breath. * A counseling information sheet has been given (today or previously). All their questions were answered.  # Smoking. * Cessation.  # GERD. * Follow up with GI.  # Oral infections. * Advised to see dentist.  # Skin lesion in neck. * Advised to see surgery for followup. They were instructed that this referral is important for their health and that it is their responsibility to make and keep this appointment. All their questions were answered.  # Unsteadiness and right finger numbness. * Neuro referral advised. They were instructed that this referral is important for their health and that it is their responsibility to make and keep this appointment. All their questions were answered.

## 2023-08-15 NOTE — PHYSICAL EXAM
[General Appearance - Alert] : alert [General Appearance - In No Acute Distress] : in no acute distress [Neck Appearance] : the appearance of the neck was normal [Neck Cervical Mass (___cm)] : no neck mass was observed [Jugular Venous Distention Increased] : there was no jugular-venous distention [Thyroid Diffuse Enlargement] : the thyroid was not enlarged [Thyroid Nodule] : there were no palpable thyroid nodules [Auscultation Breath Sounds / Voice Sounds] : lungs were clear to auscultation bilaterally [Heart Rate And Rhythm] : heart rate was normal and rhythm regular [Heart Sounds] : normal S1 and S2 [Heart Sounds Gallop] : no gallops [Murmurs] : no murmurs [Heart Sounds Pericardial Friction Rub] : no pericardial rub [Edema] : there was no peripheral edema [Abdomen Soft] : soft [Abdomen Tenderness] : non-tender [Abdomen Mass (___ Cm)] : no abdominal mass palpated [Cervical Lymph Nodes Enlarged Posterior Bilaterally] : posterior cervical [Cervical Lymph Nodes Enlarged Anterior Bilaterally] : anterior cervical [Supraclavicular Lymph Nodes Enlarged Bilaterally] : supraclavicular [Skin Color & Pigmentation] : normal skin color and pigmentation [Skin Turgor] : normal skin turgor [] : no rash [FreeTextEntry1] : 2 cm rubbery nodule at back of neck, moveable

## 2023-08-18 LAB
25(OH)D3 SERPL-MCNC: 69.7 NG/ML
ALBUMIN SERPL ELPH-MCNC: 4.7 G/DL
ALP BLD-CCNC: 76 U/L
ALT SERPL-CCNC: 14 U/L
ANION GAP SERPL CALC-SCNC: 14 MMOL/L
APPEARANCE: CLEAR
AST SERPL-CCNC: 27 U/L
BILIRUB SERPL-MCNC: 0.8 MG/DL
BILIRUBIN URINE: NEGATIVE
BLOOD URINE: NEGATIVE
BUN SERPL-MCNC: 23 MG/DL
CALCIUM SERPL-MCNC: 10.4 MG/DL
CALCIUM SERPL-MCNC: 10.4 MG/DL
CHLORIDE SERPL-SCNC: 101 MMOL/L
CHOLEST SERPL-MCNC: 106 MG/DL
CO2 SERPL-SCNC: 26 MMOL/L
COLOR: YELLOW
CREAT SERPL-MCNC: 1.46 MG/DL
CREAT SPEC-SCNC: 175 MG/DL
CREAT SPEC-SCNC: 175 MG/DL
CREAT/PROT UR: 0.1 RATIO
CYSTATIN C SERPL-MCNC: 1.81 MG/L
EGFR: 47 ML/MIN/1.73M2
FERRITIN SERPL-MCNC: 428 NG/ML
GFR/BSA.PRED SERPLBLD CYS-BASED-ARV: 32 ML/MIN/1.73M2
GLUCOSE QUALITATIVE U: NEGATIVE MG/DL
GLUCOSE SERPL-MCNC: 102 MG/DL
HDLC SERPL-MCNC: 48 MG/DL
KETONES URINE: ABNORMAL MG/DL
LDLC SERPL CALC-MCNC: 42 MG/DL
LEUKOCYTE ESTERASE URINE: NEGATIVE
MAGNESIUM SERPL-MCNC: 2.5 MG/DL
MICROALBUMIN 24H UR DL<=1MG/L-MCNC: 2.7 MG/DL
MICROALBUMIN/CREAT 24H UR-RTO: 15 MG/G
NITRITE URINE: NEGATIVE
NONHDLC SERPL-MCNC: 58 MG/DL
PARATHYROID HORMONE INTACT: 42 PG/ML
PH URINE: 6.5
PHOSPHATE SERPL-MCNC: 3.5 MG/DL
POTASSIUM SERPL-SCNC: 5.1 MMOL/L
PROT SERPL-MCNC: 7.3 G/DL
PROT UR-MCNC: 22 MG/DL
PROTEIN URINE: NORMAL MG/DL
SODIUM SERPL-SCNC: 140 MMOL/L
SPECIFIC GRAVITY URINE: 1.02
TRIGL SERPL-MCNC: 82 MG/DL
TSH SERPL-ACNC: 1.65 UIU/ML
UROBILINOGEN URINE: 0.2 MG/DL
VIT B12 SERPL-MCNC: 968 PG/ML

## 2023-11-13 ENCOUNTER — APPOINTMENT (OUTPATIENT)
Dept: NEUROLOGY | Facility: CLINIC | Age: 83
End: 2023-11-13

## 2023-11-16 ENCOUNTER — APPOINTMENT (OUTPATIENT)
Dept: NEUROLOGY | Facility: CLINIC | Age: 83
End: 2023-11-16
Payer: MEDICARE

## 2023-11-16 ENCOUNTER — NON-APPOINTMENT (OUTPATIENT)
Age: 83
End: 2023-11-16

## 2023-11-16 VITALS
WEIGHT: 152 LBS | SYSTOLIC BLOOD PRESSURE: 133 MMHG | OXYGEN SATURATION: 97 % | BODY MASS INDEX: 23.11 KG/M2 | HEART RATE: 75 BPM | TEMPERATURE: 98.4 F | DIASTOLIC BLOOD PRESSURE: 84 MMHG | RESPIRATION RATE: 17 BRPM

## 2023-11-16 PROCEDURE — 99204 OFFICE O/P NEW MOD 45 MIN: CPT

## 2023-11-28 ENCOUNTER — APPOINTMENT (OUTPATIENT)
Dept: NEPHROLOGY | Facility: CLINIC | Age: 83
End: 2023-11-28
Payer: MEDICARE

## 2023-11-28 DIAGNOSIS — R26.81 UNSTEADINESS ON FEET: ICD-10-CM

## 2023-11-28 DIAGNOSIS — N18.30 CHRONIC KIDNEY DISEASE, STAGE 3 UNSPECIFIED: ICD-10-CM

## 2023-11-28 DIAGNOSIS — R68.89 OTHER GENERAL SYMPTOMS AND SIGNS: ICD-10-CM

## 2023-11-28 PROCEDURE — 99441: CPT | Mod: 95

## 2023-12-11 ENCOUNTER — APPOINTMENT (OUTPATIENT)
Dept: MRI IMAGING | Facility: HOSPITAL | Age: 83
End: 2023-12-11

## 2023-12-11 ENCOUNTER — OUTPATIENT (OUTPATIENT)
Dept: OUTPATIENT SERVICES | Facility: HOSPITAL | Age: 83
LOS: 1 days | End: 2023-12-11
Payer: MEDICARE

## 2023-12-11 ENCOUNTER — RESULT REVIEW (OUTPATIENT)
Age: 83
End: 2023-12-11

## 2023-12-11 DIAGNOSIS — Z98.890 OTHER SPECIFIED POSTPROCEDURAL STATES: Chronic | ICD-10-CM

## 2023-12-11 PROCEDURE — A9585: CPT

## 2023-12-11 PROCEDURE — 70553 MRI BRAIN STEM W/O & W/DYE: CPT | Mod: 26,MH

## 2023-12-11 PROCEDURE — 70553 MRI BRAIN STEM W/O & W/DYE: CPT | Mod: MH

## 2023-12-15 ENCOUNTER — TRANSCRIPTION ENCOUNTER (OUTPATIENT)
Age: 83
End: 2023-12-15

## 2023-12-21 ENCOUNTER — TRANSCRIPTION ENCOUNTER (OUTPATIENT)
Age: 83
End: 2023-12-21

## 2023-12-27 ENCOUNTER — APPOINTMENT (OUTPATIENT)
Dept: NEUROLOGY | Facility: CLINIC | Age: 83
End: 2023-12-27
Payer: MEDICARE

## 2023-12-27 DIAGNOSIS — R41.3 OTHER AMNESIA: ICD-10-CM

## 2023-12-27 DIAGNOSIS — R20.2 PARESTHESIA OF SKIN: ICD-10-CM

## 2023-12-27 PROCEDURE — 99214 OFFICE O/P EST MOD 30 MIN: CPT | Mod: 95

## 2023-12-27 NOTE — PHYSICAL EXAM
[FreeTextEntry1] : MS AAO x 3, normal comprehension, normal speech, abnormal consolidations CN: EOMI, face symmetric except L ptosis, tongue and uvula midline Motor: moves all extremities equally, no abnormal movements Sensory: symmetric to LT all extremities Gait stable unable to tandem Romberg sway

## 2023-12-27 NOTE — REASON FOR VISIT
[Home] : at home, [unfilled] , at the time of the visit. [Medical Office: ([facility])___] : at the medical office located in

## 2023-12-27 NOTE — HISTORY OF PRESENT ILLNESS
[FreeTextEntry1] : FAUSTO HAQ is a 83 year who returns to follow up for memory loss  last visit was 11/28/2023  since last visit her had MRI, I called multiple times to discuss but could not reach him, eventually spoke with daughter about MRI findings  continues to have an abnormal facial sensation. he is afraid of it but it doesn't hurt. happens about every other day for a few minutes and might reccurs a few times that day

## 2024-05-03 ENCOUNTER — RX RENEWAL (OUTPATIENT)
Age: 84
End: 2024-05-03

## 2024-05-06 ENCOUNTER — RX RENEWAL (OUTPATIENT)
Age: 84
End: 2024-05-06

## 2024-05-06 RX ORDER — ATORVASTATIN CALCIUM 40 MG/1
40 TABLET, FILM COATED ORAL
Qty: 90 | Refills: 3 | Status: ACTIVE | COMMUNITY
Start: 2022-06-13 | End: 1900-01-01

## 2024-05-13 ENCOUNTER — RX RENEWAL (OUTPATIENT)
Age: 84
End: 2024-05-13

## 2024-05-13 RX ORDER — FLUOXETINE HYDROCHLORIDE 10 MG/1
10 CAPSULE ORAL
Qty: 180 | Refills: 1 | Status: ACTIVE | COMMUNITY
Start: 2023-12-27 | End: 1900-01-01

## 2024-08-08 ENCOUNTER — RX RENEWAL (OUTPATIENT)
Age: 84
End: 2024-08-08

## 2024-09-25 ENCOUNTER — NON-APPOINTMENT (OUTPATIENT)
Age: 84
End: 2024-09-25

## 2024-09-25 RX ORDER — GABAPENTIN 100 MG/1
100 CAPSULE ORAL
Qty: 90 | Refills: 2 | Status: ACTIVE | COMMUNITY
Start: 2024-09-25 | End: 1900-01-01

## 2025-01-30 ENCOUNTER — NON-APPOINTMENT (OUTPATIENT)
Age: 85
End: 2025-01-30

## 2025-01-30 DIAGNOSIS — B35.1 TINEA UNGUIUM: ICD-10-CM

## 2025-01-30 DIAGNOSIS — L84 CORNS AND CALLOSITIES: ICD-10-CM

## 2025-02-02 ENCOUNTER — INPATIENT (INPATIENT)
Facility: HOSPITAL | Age: 85
LOS: 1 days | Discharge: HOME | DRG: 149 | End: 2025-02-04
Attending: PSYCHIATRY & NEUROLOGY | Admitting: PSYCHIATRY & NEUROLOGY
Payer: MEDICARE

## 2025-02-02 VITALS
HEART RATE: 92 BPM | TEMPERATURE: 98 F | SYSTOLIC BLOOD PRESSURE: 167 MMHG | DIASTOLIC BLOOD PRESSURE: 90 MMHG | RESPIRATION RATE: 17 BRPM | OXYGEN SATURATION: 95 % | WEIGHT: 160.06 LBS

## 2025-02-02 DIAGNOSIS — Z98.890 OTHER SPECIFIED POSTPROCEDURAL STATES: Chronic | ICD-10-CM

## 2025-02-02 LAB
ANION GAP SERPL CALC-SCNC: 9 MMOL/L — SIGNIFICANT CHANGE UP (ref 5–17)
APPEARANCE UR: CLEAR — SIGNIFICANT CHANGE UP
APTT BLD: 28.8 SEC — SIGNIFICANT CHANGE UP (ref 24.5–35.6)
BASOPHILS # BLD AUTO: 0.04 K/UL — SIGNIFICANT CHANGE UP (ref 0–0.2)
BASOPHILS NFR BLD AUTO: 0.7 % — SIGNIFICANT CHANGE UP (ref 0–2)
BILIRUB UR-MCNC: NEGATIVE — SIGNIFICANT CHANGE UP
BUN SERPL-MCNC: 11 MG/DL — SIGNIFICANT CHANGE UP (ref 7–23)
CALCIUM SERPL-MCNC: 9.5 MG/DL — SIGNIFICANT CHANGE UP (ref 8.4–10.5)
CHLORIDE SERPL-SCNC: 103 MMOL/L — SIGNIFICANT CHANGE UP (ref 96–108)
CO2 SERPL-SCNC: 27 MMOL/L — SIGNIFICANT CHANGE UP (ref 22–31)
COLOR SPEC: YELLOW — SIGNIFICANT CHANGE UP
CREAT SERPL-MCNC: 1.23 MG/DL — SIGNIFICANT CHANGE UP (ref 0.5–1.3)
DIFF PNL FLD: NEGATIVE — SIGNIFICANT CHANGE UP
EGFR: 58 ML/MIN/1.73M2 — LOW
EOSINOPHIL # BLD AUTO: 0.18 K/UL — SIGNIFICANT CHANGE UP (ref 0–0.5)
EOSINOPHIL NFR BLD AUTO: 3.3 % — SIGNIFICANT CHANGE UP (ref 0–6)
GLUCOSE SERPL-MCNC: 106 MG/DL — HIGH (ref 70–99)
GLUCOSE UR QL: NEGATIVE MG/DL — SIGNIFICANT CHANGE UP
HCT VFR BLD CALC: 45 % — SIGNIFICANT CHANGE UP (ref 39–50)
HGB BLD-MCNC: 14.2 G/DL — SIGNIFICANT CHANGE UP (ref 13–17)
IMM GRANULOCYTES NFR BLD AUTO: 0.2 % — SIGNIFICANT CHANGE UP (ref 0–0.9)
INR BLD: 0.85 — SIGNIFICANT CHANGE UP (ref 0.85–1.16)
KETONES UR-MCNC: ABNORMAL MG/DL
LEUKOCYTE ESTERASE UR-ACNC: NEGATIVE — SIGNIFICANT CHANGE UP
LYMPHOCYTES # BLD AUTO: 1.73 K/UL — SIGNIFICANT CHANGE UP (ref 1–3.3)
LYMPHOCYTES # BLD AUTO: 31.5 % — SIGNIFICANT CHANGE UP (ref 13–44)
MCHC RBC-ENTMCNC: 31 PG — SIGNIFICANT CHANGE UP (ref 27–34)
MCHC RBC-ENTMCNC: 31.6 G/DL — LOW (ref 32–36)
MCV RBC AUTO: 98.3 FL — SIGNIFICANT CHANGE UP (ref 80–100)
MONOCYTES # BLD AUTO: 0.45 K/UL — SIGNIFICANT CHANGE UP (ref 0–0.9)
MONOCYTES NFR BLD AUTO: 8.2 % — SIGNIFICANT CHANGE UP (ref 2–14)
NEUTROPHILS # BLD AUTO: 3.09 K/UL — SIGNIFICANT CHANGE UP (ref 1.8–7.4)
NEUTROPHILS NFR BLD AUTO: 56.1 % — SIGNIFICANT CHANGE UP (ref 43–77)
NITRITE UR-MCNC: NEGATIVE — SIGNIFICANT CHANGE UP
NRBC # BLD: 0 /100 WBCS — SIGNIFICANT CHANGE UP (ref 0–0)
NRBC BLD-RTO: 0 /100 WBCS — SIGNIFICANT CHANGE UP (ref 0–0)
PH UR: 7.5 — SIGNIFICANT CHANGE UP (ref 5–8)
PLATELET # BLD AUTO: 182 K/UL — SIGNIFICANT CHANGE UP (ref 150–400)
POTASSIUM SERPL-MCNC: 4.5 MMOL/L — SIGNIFICANT CHANGE UP (ref 3.5–5.3)
POTASSIUM SERPL-MCNC: SIGNIFICANT CHANGE UP (ref 3.5–5.3)
POTASSIUM SERPL-SCNC: 4.5 MMOL/L — SIGNIFICANT CHANGE UP (ref 3.5–5.3)
POTASSIUM SERPL-SCNC: SIGNIFICANT CHANGE UP (ref 3.5–5.3)
PROT UR-MCNC: NEGATIVE MG/DL — SIGNIFICANT CHANGE UP
PROTHROM AB SERPL-ACNC: 10 SEC — SIGNIFICANT CHANGE UP (ref 9.9–13.4)
RBC # BLD: 4.58 M/UL — SIGNIFICANT CHANGE UP (ref 4.2–5.8)
RBC # FLD: 13.6 % — SIGNIFICANT CHANGE UP (ref 10.3–14.5)
SODIUM SERPL-SCNC: 139 MMOL/L — SIGNIFICANT CHANGE UP (ref 135–145)
SP GR SPEC: >1.03 — HIGH (ref 1–1.03)
UROBILINOGEN FLD QL: 1 MG/DL — SIGNIFICANT CHANGE UP (ref 0.2–1)
WBC # BLD: 5.5 K/UL — SIGNIFICANT CHANGE UP (ref 3.8–10.5)
WBC # FLD AUTO: 5.5 K/UL — SIGNIFICANT CHANGE UP (ref 3.8–10.5)

## 2025-02-02 PROCEDURE — 70450 CT HEAD/BRAIN W/O DYE: CPT | Mod: 26,59

## 2025-02-02 PROCEDURE — 99291 CRITICAL CARE FIRST HOUR: CPT

## 2025-02-02 PROCEDURE — 93010 ELECTROCARDIOGRAM REPORT: CPT

## 2025-02-02 PROCEDURE — 70498 CT ANGIOGRAPHY NECK: CPT | Mod: 26

## 2025-02-02 PROCEDURE — 70496 CT ANGIOGRAPHY HEAD: CPT | Mod: 26

## 2025-02-02 PROCEDURE — 0042T: CPT

## 2025-02-02 RX ORDER — ATORVASTATIN CALCIUM 80 MG/1
80 TABLET, FILM COATED ORAL AT BEDTIME
Refills: 0 | Status: DISCONTINUED | OUTPATIENT
Start: 2025-02-02 | End: 2025-02-04

## 2025-02-02 RX ORDER — FAMOTIDINE 10 MG/ML
20 INJECTION INTRAVENOUS ONCE
Refills: 0 | Status: COMPLETED | OUTPATIENT
Start: 2025-02-02 | End: 2025-02-02

## 2025-02-02 RX ORDER — ACETAMINOPHEN, DIPHENHYDRAMINE HCL, PHENYLEPHRINE HCL 325; 25; 5 MG/1; MG/1; MG/1
5 TABLET ORAL AT BEDTIME
Refills: 0 | Status: DISCONTINUED | OUTPATIENT
Start: 2025-02-02 | End: 2025-02-04

## 2025-02-02 RX ORDER — ENOXAPARIN SODIUM 100 MG/ML
40 INJECTION SUBCUTANEOUS EVERY 24 HOURS
Refills: 0 | Status: DISCONTINUED | OUTPATIENT
Start: 2025-02-02 | End: 2025-02-04

## 2025-02-02 RX ORDER — PANTOPRAZOLE 20 MG/1
40 TABLET, DELAYED RELEASE ORAL
Refills: 0 | Status: DISCONTINUED | OUTPATIENT
Start: 2025-02-02 | End: 2025-02-04

## 2025-02-02 RX ORDER — ASPIRIN 81 MG/1
81 TABLET, COATED ORAL DAILY
Refills: 0 | Status: DISCONTINUED | OUTPATIENT
Start: 2025-02-02 | End: 2025-02-04

## 2025-02-02 RX ADMIN — ASPIRIN 81 MILLIGRAM(S): 81 TABLET, COATED ORAL at 19:58

## 2025-02-02 RX ADMIN — FAMOTIDINE 20 MILLIGRAM(S): 10 INJECTION INTRAVENOUS at 22:43

## 2025-02-02 RX ADMIN — ENOXAPARIN SODIUM 40 MILLIGRAM(S): 100 INJECTION SUBCUTANEOUS at 21:36

## 2025-02-02 RX ADMIN — Medication 75 MILLIGRAM(S): at 19:58

## 2025-02-02 RX ADMIN — ACETAMINOPHEN, DIPHENHYDRAMINE HCL, PHENYLEPHRINE HCL 5 MILLIGRAM(S): 325; 25; 5 TABLET ORAL at 21:36

## 2025-02-02 RX ADMIN — ATORVASTATIN CALCIUM 80 MILLIGRAM(S): 80 TABLET, FILM COATED ORAL at 21:35

## 2025-02-02 NOTE — ED PROVIDER NOTE - OBJECTIVE STATEMENT
82 yo M, former smoker, w/ history of HLD, low potasium syndrome, and prostate cancer presenting with c/o dizziness and unsteadiness. LKW 02/02/25 at 11;00AM. Patient states dizziness suddenly started when he tried to roll over to the left side of the bed. Eventually got up and felt very unsteady, was leaning towards the right side even when using his cane. States he has never felt anything like this before. Code stroke called after evaluation.

## 2025-02-02 NOTE — ED ADULT NURSE NOTE - OBJECTIVE STATEMENT
83 yo M PMHx HLD, low potassium syndrome, prostate ca presents to the ED co dizziness since 11AM this morning. Pt awake and alert, AOx4. Pt reports he woke up this morning at 9AM and felt fine, but then developed dizziness, nausea, and ha around 11AM. Pt reports he is still dizzy. Pt denies vomiting / vision changes. Pt reports he cannot see out of his L eye at baseline. Pt denies CP, SOB, f/c, numbness, tingling. MD Argueta at bedside. Stroke code called. Pt placed on CCM and taken to CT scan. fs done in triage. 83 yo M PMHx HLD, low potassium syndrome, prostate ca presents to the ED co dizziness since 11AM this morning. Pt awake and alert, AOx4. Pt reports he woke up this morning at 9AM and felt fine, but then developed dizziness, nausea, and ha around 11AM. Pt reports he is still dizzy. Pt denies vomiting / vision changes. Pt reports he cannot see out of his L eye at baseline. Pt denies CP, SOB, f/c, numbness, tingling. MD Argueta at bedside. Stroke code called. Pt placed on CCM and taken to CT scan. fs 86 per EMS.

## 2025-02-02 NOTE — PATIENT PROFILE ADULT - FALL HARM RISK - HARM RISK INTERVENTIONS
Assistance with ambulation/Assistance OOB with selected safe patient handling equipment/Communicate Risk of Fall with Harm to all staff/Discuss with provider need for PT consult/Monitor gait and stability/Move patient closer to nurses' station/Provide patient with walking aids - walker, cane, crutches/Reinforce activity limits and safety measures with patient and family/Sit up slowly, dangle for a short time, stand at bedside before walking/Tailored Fall Risk Interventions/Use of alarms - bed, chair and/or voice tab/Visual Cue: Yellow wristband and red socks/Bed in lowest position, wheels locked, appropriate side rails in place/Call bell, personal items and telephone in reach/Instruct patient to call for assistance before getting out of bed or chair/Non-slip footwear when patient is out of bed/[place] to call system/Physically safe environment - no spills, clutter or unnecessary equipment/Purposeful Proactive Rounding/Room/bathroom lighting operational, light cord in reach

## 2025-02-02 NOTE — H&P ADULT - NIH STROKE SCALE: 5A. MOTOR ARM, LEFT, QM
Subjective   Patient ID: Miriam Billings is a 74 y.o. female who presents for No chief complaint on file..  HPI  Patient was seen today and followed up on a 4-month visit.  She reports that her CPAP is working well at 9 cm water pressure.  The download on the device shows 100% usage over the last 90 days with an average of 9 hours and 41 minutes per night.  Her AHI is 0.4.  Patient has a Breztri inhaler that she uses 2 times a day at 2 puffs.  She also has an albuterol inhaler that she uses at 2 puffs 4 times a day as needed shortness of breath.  Her oxygen concentrator at nighttime is at 2 L and her POC device during the day is at #3.  Review of Systems  Patient reports to me that she underwent an echocardiogram which showed no pathologic heart valve changes.  Objective   Physical Exam  Pulmonary, diminished breath sounds bilaterally but clear to auscultation  Cardio, heart sounds are regular rate and rhythm with a heart rate of 98.  Extremities, she has no pretibial edema cyanosis or clubbing.  Psych, the patient is alert and oriented x 3.  Patient is not in any respiratory distress today in the office.  Assessment/Plan        Impressions:  1.  Severe emphysema.  2.  History of chronic respiratory failure, hypoxic and hypercarbic based on her past blood gas.  3.  Obstructive sleep apnea syndrome.  Recommendations:  1.  Continue with Breztri inhaler at 2 puffs twice a day with a spacer.  2.  Continue with CPAP therapy.  3.  Follow-up with the patient in 9/20/2024.  She is scheduled for a arterial blood gas to be done couple days before that office visit.      This note was transcribed using the Dragon Dictation system.  There may be grammatical, punctuation, or verbiage errors that occur with voice recognition programs.    Zhen Casillas,  05/03/24 2:24 PM   
(0) No drift; limb holds 90 (or 45) degrees for full 10 secs

## 2025-02-02 NOTE — H&P ADULT - NSHPPHYSICALEXAM_GEN_ALL_CORE
Physical exam:  General: no acute distress, awake  Cardio: regular rate and rhythm on monitor  Pulmonary: no use of accessory muscles  Extemities: no edema    Neurologic:  -Mental status: Awake, alert, oriented to person, place, and time. Speech is fluent with intact naming, repetition, and comprehension, no dysarthria. Follows 2-step and cross commands.   -Cranial nerves:   II: Visual fields are full to confrontation.  III, IV, VI: Extraocular movements are intact without nystagmus. Pupils equally round and reactive to light  V:  Facial sensation V1-V3 equal and intact   VII: Face is symmetric with normal eye closure and smile  VIII: Hearing is bilaterally intact to finger rub  XII: Tongue protrudes midline  Motor: Normal bulk and tone. No pronator drift. Strength bilateral upper extremity 5/5, bilateral lower extremities 5/5.  Sensation: Intact to light touch bilaterally. No neglect or extinction on double simultaneous testing.  Coordination: bilateral UE dysmetria on finger-to-nose, R > L, normal heel-to-shin bilaterally  Reflexes: Downgoing toes bilaterally   Gait: Shuffling gait (with cane), however not noted to lean on either side

## 2025-02-02 NOTE — ED PROVIDER NOTE - PHYSICAL EXAMINATION
VITAL SIGNS: I have reviewed nursing notes and confirm.  CONSTITUTIONAL: Well-developed; well-nourished; in no acute distress.  SKIN: Agree with RN documentation regarding decubitus evaluation. Remainder of skin exam is warm and dry, no acute rash.  HEAD: Normocephalic; atraumatic.  EYES: PERRL, EOM intact; conjunctiva and sclera clear.  ENT: No nasal discharge; airway clear.  NECK: Supple; non tender.  CARD: S1, S2 normal; no murmurs, gallops, or rubs. Regular rate and rhythm.  RESP: No wheezes, rales or rhonchi.  ABD: Normal bowel sounds; soft; non-distended; non-tender; no hepatosplenomegaly.  EXT: Normal ROM. No clubbing, cyanosis or edema.  LYMPH: No acute cervical adenopathy.  NEURO: Alert & Oriented x 3. CN II-XII intact. No facial droop. Clear speech. VALDEZ w/ 5/5 strength x 4 ext. Normal sensation. No pronator drift. + dysmetria and unsteady gait

## 2025-02-02 NOTE — ED ADULT NURSE NOTE - CHIEF COMPLAINT QUOTE
Pt. bibems c/o dizziness from 9am-3pm, with feeling of AMS ; pt was aware he was more confused then usual "out of sorts". a&ox4 in questions during triage assessment, BGL 86 with EMS. UG to MD stoner at 1550, stroke code called at 1558. bgl and weight in prog.

## 2025-02-02 NOTE — H&P ADULT - HISTORY OF PRESENT ILLNESS
82 yo M w/ history of HLD, low potasium syndrome, and prostate cancer presenting with c/o dizziness and unsteadiness. LKW 02/02/25 at 11;00AM. Patient states dizziness suddenly started when he tried to roll over to the left side of the bed. Eventually got up and felt very unsteady, was leaning towards the right side even when using his cane  .  No syncope/loc. ROS + for nausea and weakness. Denies cough, chest pain, abdominal pain, sick contacts. Stroke code is called. NIHSS = 2. Labs unremarkable. CTH unremarkable, CTP with possible 7 mL involving the left frontal brain parenchyma, and CTA is unremarkable. Patient out of the window for TNK, no indication for thrombectomy since no LVO.   80 yo M, former smoker, w/ history of HLD, low potasium syndrome, and prostate cancer presenting with c/o dizziness and unsteadiness. LKW 02/02/25 at 11;00AM. Patient states dizziness suddenly started when he tried to roll over to the left side of the bed. Eventually got up and felt very unsteady, was leaning towards the right side even when using his cane. States he has never felt anything like this before. On arrival, /98. Dizziness worsened when moving from lying in CT scan to standing. NIHSS 2 for b/l UE dysmetria, but no nystagmus, vision changes noted. Shuffling gait noted, with come unsteadiness, but patient not leaning to either side. CTH with age-indeterminate L cerebellum infarct. CTP with possible 7 mL involving the left frontal brain parenchyma - however deemed as a false positive read, and CTA is unremarkable. Patient out of the window for TNK, no indication for thrombectomy since no LVO.

## 2025-02-02 NOTE — ED ADULT NURSE NOTE - NSFALLRISKINTERV_ED_ALL_ED
Patient came into the office & wanted to know if he can continue taking oxycodone 4 times a day until after the surgery. You had changed it from 4 times a day to 3 times a day the last time the patient was seen by you. The oxycodone is due to be refilled on Thursday 10/19/17  to [facility] on [address]. Can the Rx be called in on Wednesday due to the patient lost a whole day supply of the oxycodone? Also, the patient has therapy scheduled for his bicep but was wondering if he can just wait & do PT on his bicep and back at the same time after the surgery. At his next appt he will discuss increasing the morphine. Please call the patient. Assistance OOB with selected safe patient handling equipment if applicable/Assistance with ambulation/Communicate fall risk and risk factors to all staff, patient, and family/Encourage patient to sit up slowly, dangle for a short time, stand at bedside before walking/Monitor gait and stability/Orthostatic vital signs/Provide patient with walking aids/Provide visual cue: yellow wristband, yellow gown, etc/Reinforce activity limits and safety measures with patient and family/Call bell, personal items and telephone in reach/Instruct patient to call for assistance before getting out of bed/chair/stretcher/Non-slip footwear applied when patient is off stretcher/[place] to call system/Physically safe environment - no spills, clutter or unnecessary equipment/Purposeful Proactive Rounding/Room/bathroom lighting operational, light cord in reach

## 2025-02-02 NOTE — CONSULT NOTE ADULT - ASSESSMENT
1)Secondary stroke prevention  - start ASA 81mg PO daily and Plavix 75mg PO daily  - start Atorvastatin 80mg PO daily    2) Stroke risk factors  - A1C and LDL    3) Further management  - obtain MRI brain without (short stroke protocol)  - recommend SBP goal <180  - recommend q4hr stroke neuro checks  - may need outpt neurology follow up  - provide stroke education    DVT prophylaxis   -Lovenox SQ and SCDs    Discussed with Neurology Attending Dr. Morales *INCOMPLETE*    1)Secondary stroke prevention      2) Stroke risk factors  - A1C and LDL    3) Further management  - obtain MRI brain without (short stroke protocol)  - recommend SBP goal <180  - recommend q4hr stroke neuro checks  - may need outpt neurology follow up  - provide stroke education    DVT prophylaxis   -Lovenox SQ and SCDs    Discussed with Neurology Attending Dr. Morales *INCOMPLETE*    80 yo M w/ history of high cholesterol, low potasium syndrome, and prostate cancer p/w.  No syncope/loc. ROS + for nausea and weakness. Denies cough, chest pain, abdominal pain, sick contacts. Stroke code is called. NIHSS = 2. Labs unremarkable. CTH unremarkable, CTP with possible 7 mL involving the left frontal brain parenchyma, and CTA is unremarkable. Patient out of the window for TNK, no indication for thrombectomy since no LVO.    1)Secondary stroke prevention      2) Stroke risk factors  - A1C and LDL    3) Further management  - obtain MRI brain without (short stroke protocol)  - recommend SBP goal <180  - recommend q4hr stroke neuro checks  - may need outpt neurology follow up  - provide stroke education    DVT prophylaxis   -Lovenox SQ and SCDs    Discussed with Neurology Attending Dr. Morales *INCOMPLETE*    80 yo M w/ history of HLD, low potasium syndrome, and prostate cancer presenting with c/o dizziness and unsteadiness. LKW 02/02/25 at 11;00AM. Patient states dizziness suddenly started when he tried to roll over to the left side of the bed. Eventually got up and felt very unsteady, was leaning towards the right side even when using his cane. No syncope/loc. ROS + for nausea and weakness. Denies cough, chest pain, abdominal pain, sick contacts.No syncope/loc. ROS + for nausea and weakness. Denies cough, chest pain, abdominal pain, sick contacts. Stroke code is called. NIHSS = 2. Labs unremarkable. CTH unremarkable, CTP with possible 7 mL involving the left frontal brain parenchyma, and CTA is unremarkable. Patient out of the window for TNK, no indication for thrombectomy since no LVO.    1)Secondary stroke prevention      2) Stroke risk factors  - A1C and LDL    3) Further management  - obtain MRI brain without (short stroke protocol)  - recommend SBP goal <180  - recommend q4hr stroke neuro checks  - may need outpt neurology follow up  - provide stroke education    DVT prophylaxis   -Lovenox SQ and SCDs    Discussed with Neurology Attending Dr. Morales

## 2025-02-02 NOTE — H&P ADULT - NSHPSOCIALHISTORY_GEN_ALL_CORE
Tobacco use: in 2003 smoked up to 60 cigs/day. now down to 1-2 every few days?  EtOH use: occasional  Illicit drug use :x

## 2025-02-02 NOTE — ED ADULT NURSE REASSESSMENT NOTE - NS ED NURSE REASSESS COMMENT FT1
Pt reports he is still dizzy. Pt requesting to ambulate to the bathroom. Pt educated on fall risk and provided urinal.

## 2025-02-02 NOTE — ED PROVIDER NOTE - CLINICAL SUMMARY MEDICAL DECISION MAKING FREE TEXT BOX
80 yo M, former smoker, w/ history of HLD, low potasium syndrome, and prostate cancer presenting with c/o dizziness and unsteadiness. LKW 02/02/25 at 11;00AM. Patient states dizziness suddenly started when he tried to roll over to the left side of the bed. Eventually got up and felt very unsteady, was leaning towards the right side even when using his cane. States he has never felt anything like this before.    VSS  PE + dysmetria and unsteady gait  Code stroke called  Imaging with age indeterminate cerebellum infarct c/w symptoms  Pt to be admitted to stroke for further mgmt

## 2025-02-02 NOTE — CONSULT NOTE ADULT - SUBJECTIVE AND OBJECTIVE BOX
**STROKE CODE CONSULT NOTE**    Last known well time/Time of onset of symptoms: 11 pm 2/2/2025    HPI: 80 yo M w/ history of high cholesterol, prostate cancer p/w.  No syncope/loc. ROS + for nausea and weakness. Denies cough, chest pain, abdominal pain, sick contacts. CTH, CTP and CTA is unremarkable    T(C): 36.6 (02-02-25 @ 16:09), Max: 36.6 (02-02-25 @ 16:09)  HR: 92 (02-02-25 @ 16:09) (92 - 92)  BP: 167/90 (02-02-25 @ 16:09) (167/90 - 167/90)  RR: 17 (02-02-25 @ 16:09) (17 - 17)  SpO2: 95% (02-02-25 @ 16:09) (95% - 95%)    PAST MEDICAL & SURGICAL HISTORY:  Hyperlipemia  Low potassium syndrome  CA of prostate  H/O eye surgery  6 surgeries of L eye    FAMILY HISTORY:    SOCIAL HISTORY:   Patient lives with *** at ***.   Smoking status:  Drinking:  Drug Use:     ROS: ***  Constitutional: No fever, weight loss or fatigue  Eyes: No eye pain, visual disturbances, or discharge  ENMT:  No difficulty hearing, tinnitus; No sinus or throat pain  Neck: No pain or stiffness  Respiratory: No cough, wheezing, chills or hemoptysis  Cardiovascular: No chest pain, palpitations, shortness of breath, or leg swelling  Gastrointestinal: No abdominal pain. No nausea, vomiting or hematemesis; No diarrhea or constipation. Nohematochezia.  Genitourinary: No dysuria, frequency, hematuria or incontinence  Neurological: As per HPI  Skin: No itching, burning, rashes or lesions   Endocrine: No heat or cold intolerance; No hair loss  Musculoskeletal: No joint pain or swelling; No muscle, back or extremity pain  Heme/Lymph: No easy bruising or bleeding gums    MEDICATIONS HOME:      Allergies  No Known Allergies    Vital Signs Last 24 Hrs  T(C): 36.6 (02 Feb 2025 16:09), Max: 36.6 (02 Feb 2025 16:09)  T(F): 97.8 (02 Feb 2025 16:09), Max: 97.8 (02 Feb 2025 16:09)  HR: 92 (02 Feb 2025 16:09) (92 - 92)  BP: 167/90 (02 Feb 2025 16:09) (167/90 - 167/90)  RR: 17 (02 Feb 2025 16:09) (17 - 17)  SpO2: 95% (02 Feb 2025 16:09) (95% - 95%)    Parameters below as of 02 Feb 2025 16:09  Patient On (Oxygen Delivery Method): room air    Neurologic:  -Mental status: Awake, alert, oriented to person, place, and time. Speech is fluent with intact naming, repetition, and comprehension, no dysarthria. Recent and remote memory intact. Follows commands. Attention/concentration intact. Fund of knowledge appropriate.  -Cranial nerves:   II: Visual fields are full to confrontation.  III, IV, VI: Extraocular movements are intact without nystagmus. Pupils equally round and reactive to light  V:  Facial sensation V1-V3 equal and intact   VII: Face is symmetric with normal eye closure and smile  VIII: Hearing is bilaterally intact to finger rub  IX, X: Uvula is midline and soft palate rises symmetrically  XI: Head turning and shoulder shrug are intact.  XII: Tongue protrudes midline  Motor: Normal bulk and tone. No pronator drift. Strength bilateral upper extremity 5/5, bilateral lower extremities 5/5.  Rapid alternating movements intact and symmetric  Sensation: Intact to light touch bilaterally. No neglect or extinction on double simultaneous testing.  Coordination: No dysmetria on finger-to-nose and heel-to-shin bilaterally  Reflexes: Downgoing toes bilaterally   Gait: Narrow gait and steady    NIHSS: 2 ASPECT Score: 10    Fingerstick Blood Glucose: CAPILLARY BLOOD GLUCOSE    LABS:                    RADIOLOGY & ADDITIONAL STUDIES:      -----------------------------------------------------------------------------------------------------------------  IV-tPA (Y/N):    ***                              Bolus time:    Alteplase Dose Verification w/ RN:  Reason IV-tPA not given: ***    **STROKE CODE CONSULT NOTE**    Last known well time/Time of onset of symptoms: 11 pm 2/2/2025    HPI: 80 yo M w/ history of high cholesterol, prostate cancer p/w.  No syncope/loc. ROS + for nausea and weakness. Denies cough, chest pain, abdominal pain, sick contacts. CTH, CTP and CTA is unremarkable    T(C): 36.6 (02-02-25 @ 16:09), Max: 36.6 (02-02-25 @ 16:09)  HR: 92 (02-02-25 @ 16:09) (92 - 92)  BP: 167/90 (02-02-25 @ 16:09) (167/90 - 167/90)  RR: 17 (02-02-25 @ 16:09) (17 - 17)  SpO2: 95% (02-02-25 @ 16:09) (95% - 95%)    PAST MEDICAL & SURGICAL HISTORY:  Hyperlipemia  Low potassium syndrome  CA of prostate  H/O eye surgery  6 surgeries of L eye    FAMILY HISTORY:    SOCIAL HISTORY:   Patient lives with *** at ***.   Smoking status:  Drinking:  Drug Use:     ROS: ***  Constitutional: No fever, weight loss or fatigue  Eyes: No eye pain, visual disturbances, or discharge  ENMT:  No difficulty hearing, tinnitus; No sinus or throat pain  Neck: No pain or stiffness  Respiratory: No cough, wheezing, chills or hemoptysis  Cardiovascular: No chest pain, palpitations, shortness of breath, or leg swelling  Gastrointestinal: No abdominal pain. No nausea, vomiting or hematemesis; No diarrhea or constipation. Nohematochezia.  Genitourinary: No dysuria, frequency, hematuria or incontinence  Neurological: As per HPI  Skin: No itching, burning, rashes or lesions   Endocrine: No heat or cold intolerance; No hair loss  Musculoskeletal: No joint pain or swelling; No muscle, back or extremity pain  Heme/Lymph: No easy bruising or bleeding gums    MEDICATIONS HOME:      Allergies  No Known Allergies    Vital Signs Last 24 Hrs  T(C): 36.6 (02 Feb 2025 16:09), Max: 36.6 (02 Feb 2025 16:09)  T(F): 97.8 (02 Feb 2025 16:09), Max: 97.8 (02 Feb 2025 16:09)  HR: 92 (02 Feb 2025 16:09) (92 - 92)  BP: 167/90 (02 Feb 2025 16:09) (167/90 - 167/90)  RR: 17 (02 Feb 2025 16:09) (17 - 17)  SpO2: 95% (02 Feb 2025 16:09) (95% - 95%)    Parameters below as of 02 Feb 2025 16:09  Patient On (Oxygen Delivery Method): room air    Neurologic:  -Mental status: Awake, alert, oriented to person, place, and time. Speech is fluent with intact naming, repetition, and comprehension, no dysarthria. Recent and remote memory intact. Follows commands. Attention/concentration intact. Fund of knowledge appropriate.  -Cranial nerves:   II: Visual fields are full to confrontation.  III, IV, VI: Extraocular movements are intact without nystagmus. Pupils equally round and reactive to light  V:  Facial sensation V1-V3 equal and intact   VII: Face is symmetric with normal eye closure and smile  VIII: Hearing is bilaterally intact to finger rub  IX, X: Uvula is midline and soft palate rises symmetrically  XI: Head turning and shoulder shrug are intact.  XII: Tongue protrudes midline  Motor: Normal bulk and tone. No pronator drift. Strength bilateral upper extremity 5/5, bilateral lower extremities 5/5.  Rapid alternating movements intact and symmetric  Sensation: Intact to light touch bilaterally. No neglect or extinction on double simultaneous testing.  Coordination: No dysmetria on finger-to-nose and heel-to-shin bilaterally  Reflexes: Downgoing toes bilaterally   Gait: Narrow gait and steady    NIHSS: 2 ASPECT Score: 10    Fingerstick Blood Glucose: CAPILLARY BLOOD GLUCOSE    **STROKE CODE CONSULT NOTE**    Last known well time/Time of onset of symptoms: 11 pm 2/2/2025    HPI: 82 yo M w/ history of high cholesterol, prostate cancer p/w.  No syncope/loc. ROS + for nausea and weakness. Denies cough, chest pain, abdominal pain, sick contacts. CTH, CTP and CTA is unremarkable    T(C): 36.6 (02-02-25 @ 16:09), Max: 36.6 (02-02-25 @ 16:09)  HR: 92 (02-02-25 @ 16:09) (92 - 92)  BP: 167/90 (02-02-25 @ 16:09) (167/90 - 167/90)  RR: 17 (02-02-25 @ 16:09) (17 - 17)  SpO2: 95% (02-02-25 @ 16:09) (95% - 95%)    PAST MEDICAL & SURGICAL HISTORY:  Hyperlipemia  Low potassium syndrome  CA of prostate  H/O eye surgery  6 surgeries of L eye    FAMILY HISTORY:    SOCIAL HISTORY:   Tobacco use: in 2003 smoked up to 60 cigs/day. now down to 1-2 every few days?  EtOH use: occasional  Illicit drug use:x    ROS: ***  Constitutional: No fever, weight loss or fatigue  Eyes: No eye pain, visual disturbances, or discharge  ENMT:  No difficulty hearing, tinnitus; No sinus or throat pain  Neck: No pain or stiffness  Respiratory: No cough, wheezing, chills or hemoptysis  Cardiovascular: No chest pain, palpitations, shortness of breath, or leg swelling  Gastrointestinal: No abdominal pain. No nausea, vomiting or hematemesis; No diarrhea or constipation. Nohematochezia.  Genitourinary: No dysuria, frequency, hematuria or incontinence  Neurological: As per HPI  Skin: No itching, burning, rashes or lesions   Endocrine: No heat or cold intolerance; No hair loss  Musculoskeletal: No joint pain or swelling; No muscle, back or extremity pain  Heme/Lymph: No easy bruising or bleeding gums    MEDICATIONS HOME:      Allergies  No Known Allergies    Vital Signs Last 24 Hrs  T(C): 36.6 (02 Feb 2025 16:09), Max: 36.6 (02 Feb 2025 16:09)  T(F): 97.8 (02 Feb 2025 16:09), Max: 97.8 (02 Feb 2025 16:09)  HR: 92 (02 Feb 2025 16:09) (92 - 92)  BP: 167/90 (02 Feb 2025 16:09) (167/90 - 167/90)  RR: 17 (02 Feb 2025 16:09) (17 - 17)  SpO2: 95% (02 Feb 2025 16:09) (95% - 95%)    Parameters below as of 02 Feb 2025 16:09  Patient On (Oxygen Delivery Method): room air    Neurologic:  -Mental status: Awake, alert, oriented to person, place, and time. Speech is fluent with intact naming, repetition, and comprehension, no dysarthria. Recent and remote memory intact. Follows commands. Attention/concentration intact. Fund of knowledge appropriate.  -Cranial nerves:   II: Visual fields are full to confrontation.  III, IV, VI: Extraocular movements are intact without nystagmus. Pupils equally round and reactive to light  V:  Facial sensation V1-V3 equal and intact   VII: Face is symmetric with normal eye closure and smile  VIII: Hearing is bilaterally intact to finger rub  IX, X: Uvula is midline and soft palate rises symmetrically  XI: Head turning and shoulder shrug are intact.  XII: Tongue protrudes midline  Motor: Normal bulk and tone. No pronator drift. Strength bilateral upper extremity 5/5, bilateral lower extremities 5/5.  Rapid alternating movements intact and symmetric  Sensation: Intact to light touch bilaterally. No neglect or extinction on double simultaneous testing.  Coordination: No dysmetria on finger-to-nose and heel-to-shin bilaterally  Reflexes: Downgoing toes bilaterally   Gait: Narrow gait and steady    NIHSS: 2 ASPECT Score: 10    Fingerstick Blood Glucose: CAPILLARY BLOOD GLUCOSE    **STROKE CODE CONSULT NOTE**    Last known well time/Time of onset of symptoms: 11 pm 2/2/2025    HPI: 80 yo M w/ history of high cholesterol, low potasium syndrome, and prostate cancer p/w.  No syncope/loc. ROS + for nausea and weakness. Denies cough, chest pain, abdominal pain, sick contacts. Stroke code is called. NIHSS = 2. Labs unremarkable. CTH unremarkable, CTP with possible 7 mL involving the left frontal brain parenchyma, and CTA is unremarkable. Patient out of the window for TNK, no indication for thrombectomy since no LVO.    T(C): 36.6 (02-02-25 @ 16:09), Max: 36.6 (02-02-25 @ 16:09)  HR: 92 (02-02-25 @ 16:09) (92 - 92)  BP: 167/90 (02-02-25 @ 16:09) (167/90 - 167/90)  RR: 17 (02-02-25 @ 16:09) (17 - 17)  SpO2: 95% (02-02-25 @ 16:09) (95% - 95%)    CT PERFUSION:  Technical limitations: Mild to moderate patient motion during image   acquisition.    Core infarction: 7 mL involving the left frontal brain parenchyma. Please   note this finding may represent a false positive as this area of   involvement appears to correlate with region is a prominent sulcation.  Penumbra / tissue at risk for active ischemia: No additional brain   parenchyma is at risk for active ischemia.    CTA NECK:  No evidence of significant stenosis or occlusion.    CTA HEAD:  1. No large vessel occlusion.  2. Deposition of calcified plaque in association with the cavernous and   supraclinoid bilateral internal carotid arteries, with mild stenosis   bilaterally in this location.  3. No aneurysm identified. Tiny aneurysms can be beyond the resolution of   CTA technique.      PAST MEDICAL & SURGICAL HISTORY:  Hyperlipemia  Low potassium syndrome  CA of prostate  H/O eye surgery  6 surgeries of L eye    FAMILY HISTORY:    SOCIAL HISTORY:   Tobacco use: in 2003 smoked up to 60 cigs/day. now down to 1-2 every few days?  EtOH use: occasional  Illicit drug use:x    ROS: ***  Constitutional: No fever, weight loss or fatigue  Eyes: No eye pain, visual disturbances, or discharge  ENMT:  No difficulty hearing, tinnitus; No sinus or throat pain  Neck: No pain or stiffness  Respiratory: No cough, wheezing, chills or hemoptysis  Cardiovascular: No chest pain, palpitations, shortness of breath, or leg swelling  Gastrointestinal: No abdominal pain. No nausea, vomiting or hematemesis; No diarrhea or constipation. Nohematochezia.  Genitourinary: No dysuria, frequency, hematuria or incontinence  Neurological: As per HPI  Skin: No itching, burning, rashes or lesions   Endocrine: No heat or cold intolerance; No hair loss  Musculoskeletal: No joint pain or swelling; No muscle, back or extremity pain  Heme/Lymph: No easy bruising or bleeding gums    MEDICATIONS HOME:      Allergies  No Known Allergies    Vital Signs Last 24 Hrs  T(C): 36.6 (02 Feb 2025 16:09), Max: 36.6 (02 Feb 2025 16:09)  T(F): 97.8 (02 Feb 2025 16:09), Max: 97.8 (02 Feb 2025 16:09)  HR: 92 (02 Feb 2025 16:09) (92 - 92)  BP: 167/90 (02 Feb 2025 16:09) (167/90 - 167/90)  RR: 17 (02 Feb 2025 16:09) (17 - 17)  SpO2: 95% (02 Feb 2025 16:09) (95% - 95%)    Parameters below as of 02 Feb 2025 16:09  Patient On (Oxygen Delivery Method): room air    Neurologic:  -Mental status: Awake, alert, oriented to person, place, and time. Speech is fluent with intact naming, repetition, and comprehension, no dysarthria. Recent and remote memory intact. Follows commands. Attention/concentration intact. Fund of knowledge appropriate.  -Cranial nerves:   II: Visual fields are full to confrontation.  III, IV, VI: Extraocular movements are intact without nystagmus. Pupils equally round and reactive to light  V:  Facial sensation V1-V3 equal and intact   VII: Face is symmetric with normal eye closure and smile  VIII: Hearing is bilaterally intact to finger rub  IX, X: Uvula is midline and soft palate rises symmetrically  XI: Head turning and shoulder shrug are intact.  XII: Tongue protrudes midline  Motor: Normal bulk and tone. No pronator drift. Strength bilateral upper extremity 5/5, bilateral lower extremities 5/5.  Rapid alternating movements intact and symmetric  Sensation: Intact to light touch bilaterally. No neglect or extinction on double simultaneous testing.  Coordination: No dysmetria on finger-to-nose and heel-to-shin bilaterally  Reflexes: Downgoing toes bilaterally   Gait: Narrow gait and steady    NIHSS: 2 ASPECT Score: 10    Fingerstick Blood Glucose: CAPILLARY BLOOD GLUCOSE    **STROKE CODE CONSULT NOTE**    Last known well time/Time of onset of symptoms: 11 pm 2/2/2025    HPI: 82 yo M w/ history of high cholesterol, low potasium syndrome, and prostate cancer p/w.  No syncope/loc. ROS + for nausea and weakness. Denies cough, chest pain, abdominal pain, sick contacts. Stroke code is called. NIHSS = 2. Labs unremarkable. CTH unremarkable, CTP with possible 7 mL involving the left frontal brain parenchyma, and CTA is unremarkable. Patient out of the window for TNK, no indication for thrombectomy since no LVO.    T(C): 36.6 (02-02-25 @ 16:09), Max: 36.6 (02-02-25 @ 16:09)  HR: 92 (02-02-25 @ 16:09) (92 - 92)  BP: 167/90 (02-02-25 @ 16:09) (167/90 - 167/90)  RR: 17 (02-02-25 @ 16:09) (17 - 17)  SpO2: 95% (02-02-25 @ 16:09) (95% - 95%)    CT PERFUSION:  Technical limitations: Mild to moderate patient motion during image   acquisition.    Core infarction: 7 mL involving the left frontal brain parenchyma. Please   note this finding may represent a false positive as this area of   involvement appears to correlate with region is a prominent sulcation.  Penumbra / tissue at risk for active ischemia: No additional brain   parenchyma is at risk for active ischemia.    CTA NECK:  No evidence of significant stenosis or occlusion.    CTA HEAD:  1. No large vessel occlusion.  2. Deposition of calcified plaque in association with the cavernous and   supraclinoid bilateral internal carotid arteries, with mild stenosis   bilaterally in this location.  3. No aneurysm identified. Tiny aneurysms can be beyond the resolution of   CTA technique.      PAST MEDICAL & SURGICAL HISTORY:  Hyperlipemia  Low potassium syndrome  CA of prostate  H/O eye surgery  6 surgeries of L eye    FAMILY HISTORY:    SOCIAL HISTORY:   Tobacco use: in 2003 smoked up to 60 cigs/day. now down to 1-2 every few days?  EtOH use: occasional  Illicit drug use:x    ROS: ***  Constitutional: No fever, weight loss or fatigue  Eyes: No eye pain, visual disturbances, or discharge  ENMT:  No difficulty hearing, tinnitus; No sinus or throat pain  Neck: No pain or stiffness  Respiratory: No cough, wheezing, chills or hemoptysis  Cardiovascular: No chest pain, palpitations, shortness of breath, or leg swelling  Gastrointestinal: No abdominal pain. No nausea, vomiting or hematemesis; No diarrhea or constipation. Nohematochezia.  Genitourinary: No dysuria, frequency, hematuria or incontinence  Neurological: As per HPI  Skin: No itching, burning, rashes or lesions   Endocrine: No heat or cold intolerance; No hair loss  Musculoskeletal: No joint pain or swelling; No muscle, back or extremity pain  Heme/Lymph: No easy bruising or bleeding gums    MEDICATIONS HOME:      Allergies  No Known Allergies    Vital Signs Last 24 Hrs  T(C): 36.6 (02 Feb 2025 16:09), Max: 36.6 (02 Feb 2025 16:09)  T(F): 97.8 (02 Feb 2025 16:09), Max: 97.8 (02 Feb 2025 16:09)  HR: 92 (02 Feb 2025 16:09) (92 - 92)  BP: 167/90 (02 Feb 2025 16:09) (167/90 - 167/90)  RR: 17 (02 Feb 2025 16:09) (17 - 17)  SpO2: 95% (02 Feb 2025 16:09) (95% - 95%)    Parameters below as of 02 Feb 2025 16:09  Patient On (Oxygen Delivery Method): room air    Neurologic:  -Mental status: Awake, alert, oriented to person, place, and time. Speech is fluent with intact naming, repetition, and comprehension, no dysarthria. Recent and remote memory intact. Follows commands. Attention/concentration intact. Fund of knowledge appropriate.  -Cranial nerves:   II: Visual fields are full to confrontation.  III, IV, VI: Extraocular movements are intact without nystagmus. Pupils equally round and reactive to light  V:  Facial sensation V1-V3 equal and intact   VII: Face is symmetric with normal eye closure and smile  VIII: Hearing is bilaterally intact to finger rub  IX, X: Uvula is midline and soft palate rises symmetrically  XI: Head turning and shoulder shrug are intact.  XII: Tongue protrudes midline  Motor: Normal bulk and tone. No pronator drift. Strength bilateral upper extremity 5/5, bilateral lower extremities 5/5.  Rapid alternating movements intact and symmetric  Sensation: Intact to light touch bilaterally. No neglect or extinction on double simultaneous testing.  Coordination: bilateral dysmetria on finger-to-nose, normal heel-to-shin bilaterally  Reflexes: Downgoing toes bilaterally   Gait: Non assessed    NIHSS: 2 ASPECT Score: 10    Fingerstick Blood Glucose: CAPILLARY BLOOD GLUCOSE    **STROKE CODE CONSULT NOTE**    Last known well time/Time of onset of symptoms: 11 pm 2/2/2025    HPI: 80 yo M w/ history of HLD, low potasium syndrome, and prostate cancer presenting with c/o dizziness and unsteadiness. LKW 02/02/25 at 11;00AM. Patient states dizziness suddenly started when he tried to roll over to the left side of the bed. Eventually got up and felt very unsteady, was leaning towards the right side even when using his cane. No syncope/loc. ROS + for nausea and weakness. Denies cough, chest pain, abdominal pain, sick contacts.No syncope/loc. ROS + for nausea and weakness. Denies cough, chest pain, abdominal pain, sick contacts. Stroke code is called. NIHSS = 2. Labs unremarkable. CTH unremarkable, CTP with possible 7 mL involving the left frontal brain parenchyma, and CTA is unremarkable. Patient out of the window for TNK, no indication for thrombectomy since no LVO.    T(C): 36.6 (02-02-25 @ 16:09), Max: 36.6 (02-02-25 @ 16:09)  HR: 92 (02-02-25 @ 16:09) (92 - 92)  BP: 167/90 (02-02-25 @ 16:09) (167/90 - 167/90)  RR: 17 (02-02-25 @ 16:09) (17 - 17)  SpO2: 95% (02-02-25 @ 16:09) (95% - 95%)    CT PERFUSION:  Technical limitations: Mild to moderate patient motion during image   acquisition.    Core infarction: 7 mL involving the left frontal brain parenchyma. Please   note this finding may represent a false positive as this area of   involvement appears to correlate with region is a prominent sulcation.  Penumbra / tissue at risk for active ischemia: No additional brain   parenchyma is at risk for active ischemia.    CTA NECK:  No evidence of significant stenosis or occlusion.    CTA HEAD:  1. No large vessel occlusion.  2. Deposition of calcified plaque in association with the cavernous and   supraclinoid bilateral internal carotid arteries, with mild stenosis   bilaterally in this location.  3. No aneurysm identified. Tiny aneurysms can be beyond the resolution of   CTA technique.      PAST MEDICAL & SURGICAL HISTORY:  Hyperlipemia  Low potassium syndrome  CA of prostate  H/O eye surgery  6 surgeries of L eye    FAMILY HISTORY:    SOCIAL HISTORY:   Tobacco use: in 2003 smoked up to 60 cigs/day. now down to 1-2 every few days?  EtOH use: occasional  Illicit drug use:x    ROS: ***  Constitutional: No fever, weight loss or fatigue  Eyes: No eye pain, visual disturbances, or discharge  ENMT:  No difficulty hearing, tinnitus; No sinus or throat pain  Neck: No pain or stiffness  Respiratory: No cough, wheezing, chills or hemoptysis  Cardiovascular: No chest pain, palpitations, shortness of breath, or leg swelling  Gastrointestinal: No abdominal pain. No nausea, vomiting or hematemesis; No diarrhea or constipation. Nohematochezia.  Genitourinary: No dysuria, frequency, hematuria or incontinence  Neurological: As per HPI  Skin: No itching, burning, rashes or lesions   Endocrine: No heat or cold intolerance; No hair loss  Musculoskeletal: No joint pain or swelling; No muscle, back or extremity pain  Heme/Lymph: No easy bruising or bleeding gums    MEDICATIONS HOME:      Allergies  No Known Allergies    Vital Signs Last 24 Hrs  T(C): 36.6 (02 Feb 2025 16:09), Max: 36.6 (02 Feb 2025 16:09)  T(F): 97.8 (02 Feb 2025 16:09), Max: 97.8 (02 Feb 2025 16:09)  HR: 92 (02 Feb 2025 16:09) (92 - 92)  BP: 167/90 (02 Feb 2025 16:09) (167/90 - 167/90)  RR: 17 (02 Feb 2025 16:09) (17 - 17)  SpO2: 95% (02 Feb 2025 16:09) (95% - 95%)    Parameters below as of 02 Feb 2025 16:09  Patient On (Oxygen Delivery Method): room air    Neurologic:  -Mental status: Awake, alert, oriented to person, place, and time. Speech is fluent with intact naming, repetition, and comprehension, no dysarthria. Recent and remote memory intact. Follows commands. Attention/concentration intact. Fund of knowledge appropriate.  -Cranial nerves:   II: Visual fields are full to confrontation.  III, IV, VI: Extraocular movements are intact without nystagmus. Pupils equally round and reactive to light  V:  Facial sensation V1-V3 equal and intact   VII: Face is symmetric with normal eye closure and smile  VIII: Hearing is bilaterally intact to finger rub  IX, X: Uvula is midline and soft palate rises symmetrically  XI: Head turning and shoulder shrug are intact.  XII: Tongue protrudes midline  Motor: Normal bulk and tone. No pronator drift. Strength bilateral upper extremity 5/5, bilateral lower extremities 5/5.  Rapid alternating movements intact and symmetric  Sensation: Intact to light touch bilaterally. No neglect or extinction on double simultaneous testing.  Coordination: bilateral dysmetria on finger-to-nose, normal heel-to-shin bilaterally  Reflexes: Downgoing toes bilaterally   Gait: Non assessed    NIHSS: 2 ASPECT Score: 10    Fingerstick Blood Glucose: CAPILLARY BLOOD GLUCOSE

## 2025-02-02 NOTE — ED ADULT NURSE REASSESSMENT NOTE - NS ED NURSE REASSESS COMMENT FT1
pt. had refused glucose fingerstick on arrival to triage RN, stating "they just did it in the ambulance". EMS had bgl reading of 86. MD and primary RN made aware.

## 2025-02-02 NOTE — PATIENT PROFILE ADULT - FUNCTIONAL ASSESSMENT - BASIC MOBILITY 6.
3-calculated by average/Not able to assess (calculate score using [facility] averaging method)
details…

## 2025-02-02 NOTE — PATIENT PROFILE ADULT - NSPROPTRIGHTSUPPORTPERSON_GEN_A_NUR
May 16, 2022     Patient: Vikash Dorsey   YOB: 2013   Date of Visit: 5/16/2022       To Whom it May Concern:    Vikash Dorsey was seen in my clinic on 5/16/2022 at 3:40 pm.     Please excuse Vikash for his absence from school on the date listed above to be able to make his appointment. Vikash Dorsey was tested for COVID-19 on 5/11/22 and his rapid covid test was negative.     He may return to school when fever free, and has improvement in symptoms (any runny nose, congestion, cough, sore throat). Earliest that the child can return to school is 5/17/22, if Vikash has improvement in symptoms.      Sincerely,          Vaughn Kelley, DO    Medical information is confidential and cannot be disclosed without the written consent of the patient or his representative.       same name as above

## 2025-02-02 NOTE — H&P ADULT - NSHPLABSRESULTS_GEN_ALL_CORE
Fingerstick Blood Glucose: CAPILLARY BLOOD GLUCOSE        LABS:                        14.2   5.50  )-----------( 182      ( 02 Feb 2025 16:08 )             45.0     02-02    x   |  x   |  x   ----------------------------<  x   4.5   |  x   |  x     Ca    9.5      02 Feb 2025 16:08      PT/INR - ( 02 Feb 2025 16:08 )   PT: 10.0 sec;   INR: 0.85          PTT - ( 02 Feb 2025 16:08 )  PTT:28.8 sec      Urinalysis Basic - ( 02 Feb 2025 18:18 )    Color: Yellow / Appearance: Clear / SG: >1.030 / pH: x  Gluc: x / Ketone: Trace mg/dL  / Bili: Negative / Urobili: 1.0 mg/dL   Blood: x / Protein: Negative mg/dL / Nitrite: Negative   Leuk Esterase: Negative / RBC: x / WBC x   Sq Epi: x / Non Sq Epi: x / Bacteria: x        RADIOLOGY & ADDITIONAL STUDIES:    CT HEAD:  Small age indeterminant left cerebellar ischemic event. No large arterial   distribution acute infarct. No acute intracranial hemorrhage.    Findings were discussed with EVA Nunn. 2/2/2025 4:15 PM by Dr. Behr Ventura with read back confirmation.    CT PERFUSION:  Technical limitations: Mild to moderate patient motion during image   acquisition.    Core infarction: 7 mL involving the left frontal brain parenchyma. Please   note this finding may represent a false positive as this area of   involvement appears to correlate with region is a prominent sulcation.  Penumbra / tissue at risk for active ischemia: No additional brain   parenchyma is at risk for active ischemia.    CTA NECK:  No evidence of significant stenosis or occlusion.    CTA HEAD:  1. No large vessel occlusion.  2. Deposition of calcified plaque in association with the cavernous and   supraclinoid bilateral internal carotid arteries, with mild stenosis   bilaterally in this location.  3. No aneurysm identified. Tiny aneurysms can be beyond the resolution of   CTA technique.

## 2025-02-02 NOTE — H&P ADULT - ASSESSMENT
80 yo M, former smoker, w/ history of HLD, low potasium syndrome, and prostate cancer presenting with c/o dizziness and unsteadiness. LKW 02/02/25 at 11;00AM. Patient states dizziness suddenly started when he tried to roll over to the left side of the bed. Eventually got up and felt very unsteady, was leaning towards the right side even when using his cane. States he has never felt anything like this before. On arrival, /98. Dizziness worsened when moving from lying in CT scan to standing. NIHSS 2 for b/l UE dysmetria, but no nystagmus, vision changes noted. Shuffling gait noted, with come unsteadiness, but patient not leaning to either side. CTH with age-indeterminate L cerebellum infarct. CTP with possible 7 mL involving the left frontal brain parenchyma - however deemed as a false positive read, and CTA is unremarkable. Patient out of the window for TNK, no indication for thrombectomy since no LVO. Admitted to stroke tele for further workup    Neuro  #CVA workup  - continue aspirin 81mg and plavix 75mg daily  - continue atorvastatin 80mg daily  - q4hr stroke neuro checks and vitals  - obtain MRI Brain without contrast short stroke  - Stroke Code HCT Results: age-indeterminate L cerebellum infarct  - Stroke Code CTA Results: no LVO or severe stenosis  - Stroke education    Cards  #HTN  - Goal -180  - obtain TTE      #HLD  - high dose statin as above in CVA  - LDL results: pending    Pulm  - call provider if SPO2 < 94%    GI  #Nutrition/Fluids/Electrolytes   - replete K<4 and Mg <2  - Diet: DASH  - IVF: none     Renal  - daily BMP    Infectious Disease  - afebrile on admission    Endocrine  #DM  - A1C results: pending  - ISS    - TSH results: pending    DVT Prophylaxis  - lovenox sq for DVT prophylaxis   - SCDs for DVT prophylaxis     Dispo: pending PT/OT eval      Case discussed with Dr. Morales

## 2025-02-03 DIAGNOSIS — E78.5 HYPERLIPIDEMIA, UNSPECIFIED: ICD-10-CM

## 2025-02-03 DIAGNOSIS — R42 DIZZINESS AND GIDDINESS: ICD-10-CM

## 2025-02-03 LAB
A1C WITH ESTIMATED AVERAGE GLUCOSE RESULT: 5.3 % — SIGNIFICANT CHANGE UP (ref 4–5.6)
ANION GAP SERPL CALC-SCNC: 9 MMOL/L — SIGNIFICANT CHANGE UP (ref 5–17)
BASOPHILS # BLD AUTO: 0.04 K/UL — SIGNIFICANT CHANGE UP (ref 0–0.2)
BASOPHILS NFR BLD AUTO: 0.8 % — SIGNIFICANT CHANGE UP (ref 0–2)
BUN SERPL-MCNC: 12 MG/DL — SIGNIFICANT CHANGE UP (ref 7–23)
CALCIUM SERPL-MCNC: 9.2 MG/DL — SIGNIFICANT CHANGE UP (ref 8.4–10.5)
CHLORIDE SERPL-SCNC: 105 MMOL/L — SIGNIFICANT CHANGE UP (ref 96–108)
CHOLEST SERPL-MCNC: 157 MG/DL — SIGNIFICANT CHANGE UP
CO2 SERPL-SCNC: 25 MMOL/L — SIGNIFICANT CHANGE UP (ref 22–31)
CREAT SERPL-MCNC: 1.23 MG/DL — SIGNIFICANT CHANGE UP (ref 0.5–1.3)
EGFR: 58 ML/MIN/1.73M2 — LOW
EOSINOPHIL # BLD AUTO: 0.18 K/UL — SIGNIFICANT CHANGE UP (ref 0–0.5)
EOSINOPHIL NFR BLD AUTO: 3.6 % — SIGNIFICANT CHANGE UP (ref 0–6)
ESTIMATED AVERAGE GLUCOSE: 105 MG/DL — SIGNIFICANT CHANGE UP (ref 68–114)
GLUCOSE SERPL-MCNC: 93 MG/DL — SIGNIFICANT CHANGE UP (ref 70–99)
HCT VFR BLD CALC: 40.7 % — SIGNIFICANT CHANGE UP (ref 39–50)
HDLC SERPL-MCNC: 52 MG/DL — SIGNIFICANT CHANGE UP
HGB BLD-MCNC: 12.7 G/DL — LOW (ref 13–17)
IMM GRANULOCYTES NFR BLD AUTO: 0.2 % — SIGNIFICANT CHANGE UP (ref 0–0.9)
LIPID PNL WITH DIRECT LDL SERPL: 89 MG/DL — SIGNIFICANT CHANGE UP
LYMPHOCYTES # BLD AUTO: 1.85 K/UL — SIGNIFICANT CHANGE UP (ref 1–3.3)
LYMPHOCYTES # BLD AUTO: 36.9 % — SIGNIFICANT CHANGE UP (ref 13–44)
MAGNESIUM SERPL-MCNC: 2.2 MG/DL — SIGNIFICANT CHANGE UP (ref 1.6–2.6)
MCHC RBC-ENTMCNC: 30.6 PG — SIGNIFICANT CHANGE UP (ref 27–34)
MCHC RBC-ENTMCNC: 31.2 G/DL — LOW (ref 32–36)
MCV RBC AUTO: 98.1 FL — SIGNIFICANT CHANGE UP (ref 80–100)
MONOCYTES # BLD AUTO: 0.56 K/UL — SIGNIFICANT CHANGE UP (ref 0–0.9)
MONOCYTES NFR BLD AUTO: 11.2 % — SIGNIFICANT CHANGE UP (ref 2–14)
NEUTROPHILS # BLD AUTO: 2.37 K/UL — SIGNIFICANT CHANGE UP (ref 1.8–7.4)
NEUTROPHILS NFR BLD AUTO: 47.3 % — SIGNIFICANT CHANGE UP (ref 43–77)
NON HDL CHOLESTEROL: 105 MG/DL — SIGNIFICANT CHANGE UP
NRBC # BLD: 0 /100 WBCS — SIGNIFICANT CHANGE UP (ref 0–0)
NRBC BLD-RTO: 0 /100 WBCS — SIGNIFICANT CHANGE UP (ref 0–0)
PHOSPHATE SERPL-MCNC: 2.8 MG/DL — SIGNIFICANT CHANGE UP (ref 2.5–4.5)
PLATELET # BLD AUTO: 153 K/UL — SIGNIFICANT CHANGE UP (ref 150–400)
POTASSIUM SERPL-MCNC: 3.8 MMOL/L — SIGNIFICANT CHANGE UP (ref 3.5–5.3)
POTASSIUM SERPL-SCNC: 3.8 MMOL/L — SIGNIFICANT CHANGE UP (ref 3.5–5.3)
RBC # BLD: 4.15 M/UL — LOW (ref 4.2–5.8)
RBC # FLD: 13.6 % — SIGNIFICANT CHANGE UP (ref 10.3–14.5)
SODIUM SERPL-SCNC: 139 MMOL/L — SIGNIFICANT CHANGE UP (ref 135–145)
TRIGL SERPL-MCNC: 88 MG/DL — SIGNIFICANT CHANGE UP
TSH SERPL-MCNC: 1.86 UIU/ML — SIGNIFICANT CHANGE UP (ref 0.27–4.2)
WBC # BLD: 5.01 K/UL — SIGNIFICANT CHANGE UP (ref 3.8–10.5)
WBC # FLD AUTO: 5.01 K/UL — SIGNIFICANT CHANGE UP (ref 3.8–10.5)

## 2025-02-03 PROCEDURE — 70551 MRI BRAIN STEM W/O DYE: CPT | Mod: 26

## 2025-02-03 PROCEDURE — 99223 1ST HOSP IP/OBS HIGH 75: CPT

## 2025-02-03 PROCEDURE — 99223 1ST HOSP IP/OBS HIGH 75: CPT | Mod: GC

## 2025-02-03 RX ORDER — POTASSIUM CHLORIDE 750 MG/1
20 TABLET, EXTENDED RELEASE ORAL ONCE
Refills: 0 | Status: COMPLETED | OUTPATIENT
Start: 2025-02-03 | End: 2025-02-03

## 2025-02-03 RX ADMIN — POTASSIUM CHLORIDE 20 MILLIEQUIVALENT(S): 750 TABLET, EXTENDED RELEASE ORAL at 14:19

## 2025-02-03 RX ADMIN — ACETAMINOPHEN, DIPHENHYDRAMINE HCL, PHENYLEPHRINE HCL 5 MILLIGRAM(S): 325; 25; 5 TABLET ORAL at 22:13

## 2025-02-03 RX ADMIN — Medication 75 MILLIGRAM(S): at 14:19

## 2025-02-03 RX ADMIN — ASPIRIN 81 MILLIGRAM(S): 81 TABLET, COATED ORAL at 14:18

## 2025-02-03 RX ADMIN — ENOXAPARIN SODIUM 40 MILLIGRAM(S): 100 INJECTION SUBCUTANEOUS at 22:13

## 2025-02-03 RX ADMIN — ATORVASTATIN CALCIUM 80 MILLIGRAM(S): 80 TABLET, FILM COATED ORAL at 22:13

## 2025-02-03 NOTE — OCCUPATIONAL THERAPY INITIAL EVALUATION ADULT - MODALITIES TREATMENT COMMENTS
Vision Tracking (H-Test): choppy bilateral pursuits 2/2 R vision impaired (not a new onset, has been chronic);  Vision Quadrant Test: intact on L side, NT on R side 2/2 chronic vision impairments Vision Tracking (H-Test): choppy bilateral pursuits 2/2 L vision impaired (not a new onset, has been chronic);  Vision Quadrant Test: intact on R side, NT on L side 2/2 chronic vision impairments

## 2025-02-03 NOTE — CONSULT NOTE ADULT - TIME BILLING
preparing to see Pt.; interviewing Pt.; examining Pt.; reviewing labs and images; documentation in Meta; d/c planning on IDRs; d/w Neuro ACP on rounds

## 2025-02-03 NOTE — CONSULT NOTE ADULT - SUBJECTIVE AND OBJECTIVE BOX
Patient is a 84y old  Male who presents with a chief complaint of Dizziness, dysmetria (03 Feb 2025 07:23)    HPI:  82 yo M, former smoker, w/ history of HLD, low potasium syndrome, and prostate cancer presenting with c/o dizziness and unsteadiness. LKW 02/02/25 at 11;00AM. Patient states dizziness suddenly started when he tried to roll over to the left side of the bed. Eventually got up and felt very unsteady, was leaning towards the right side even when using his cane. States he has never felt anything like this before. On arrival, /98. Dizziness worsened when moving from lying in CT scan to standing. NIHSS 2 for b/l UE dysmetria, but no nystagmus, vision changes noted. Shuffling gait noted, with come unsteadiness, but patient not leaning to either side. CTH with age-indeterminate L cerebellum infarct. CTP with possible 7 mL involving the left frontal brain parenchyma - however deemed as a false positive read, and CTA is unremarkable. Patient out of the window for TNK, no indication for thrombectomy since no LVO.   (02 Feb 2025 17:29)    Review of Systems: 12 point review of systems otherwise negative    PAST MEDICAL & SURGICAL HISTORY:  Hyperlipemia      Low potassium syndrome      CA of prostate      H/O eye surgery  6 surgeries of L eye    Social History:  Tobacco use: in 2003 smoked up to 60 cigs/day. now down to 1-2 every few days?  EtOH use: occasional  Illicit drug use :x (02 Feb 2025 17:29)    FAMILY HISTORY:  No pertinent family history in first degree relatives      MEDICATIONS  (STANDING):  aspirin enteric coated 81 milliGRAM(s) Oral daily  atorvastatin 80 milliGRAM(s) Oral at bedtime  clopidogrel Tablet 75 milliGRAM(s) Oral daily  enoxaparin Injectable 40 milliGRAM(s) SubCutaneous every 24 hours  melatonin 5 milliGRAM(s) Oral at bedtime  pantoprazole    Tablet 40 milliGRAM(s) Oral before breakfast    MEDICATIONS  (PRN):  meclizine 12.5 milliGRAM(s) Oral every 6 hours PRN Dizziness      Allergies    No Known Allergies    Intolerances          Vital Signs Last 24 Hrs  T(C): 36.5 (03 Feb 2025 09:31), Max: 36.6 (02 Feb 2025 16:09)  T(F): 97.7 (03 Feb 2025 09:31), Max: 97.8 (02 Feb 2025 16:09)  HR: 58 (03 Feb 2025 08:55) (56 - 95)  BP: 151/71 (03 Feb 2025 08:55) (127/68 - 179/84)  BP(mean): 102 (03 Feb 2025 08:55) (92 - 136)  RR: 19 (03 Feb 2025 08:55) (17 - 20)  SpO2: 97% (03 Feb 2025 08:55) (94% - 100%)    Parameters below as of 03 Feb 2025 08:55  Patient On (Oxygen Delivery Method): room air      CAPILLARY BLOOD GLUCOSE          02-02 @ 07:01  -  02-03 @ 07:00  --------------------------------------------------------  IN: 0 mL / OUT: 180 mL / NET: -180 mL        Physical Exam:  (earlier today)  Daily Height in cm: 170.18 (03 Feb 2025 05:24)    Daily   General:  comfortable appearing in NAD, elderly  HEENT:  MMM  CV:  RRR, no JVD  Lungs:  CTA B/L  Abdomen:  soft NT ND  Extremities:  no edema B/L LE  Skin:  WWP  Neuro:  AAOx3, no dysarthria    LABS:                        14.2   5.50  )-----------( 182      ( 02 Feb 2025 16:08 )             45.0     02-03    139  |  105  |  12  ----------------------------<  93  3.8   |  25  |  1.23    Ca    9.2      03 Feb 2025 06:02  Phos  2.8     02-03  Mg     2.2     02-03      PT/INR - ( 02 Feb 2025 16:08 )   PT: 10.0 sec;   INR: 0.85          PTT - ( 02 Feb 2025 16:08 )  PTT:28.8 sec  Urinalysis Basic - ( 03 Feb 2025 06:02 )    Color: x / Appearance: x / SG: x / pH: x  Gluc: 93 mg/dL / Ketone: x  / Bili: x / Urobili: x   Blood: x / Protein: x / Nitrite: x   Leuk Esterase: x / RBC: x / WBC x   Sq Epi: x / Non Sq Epi: x / Bacteria: x

## 2025-02-03 NOTE — OCCUPATIONAL THERAPY INITIAL EVALUATION ADULT - GENERAL OBSERVATIONS, REHAB EVAL
MRS 4. Pt received semi-supine in bed, +tele, +heplock, NAD, and agreeable to OT. PT Chi present for evaluation. Cleared by ORQUIDEA Corbin to see.

## 2025-02-03 NOTE — PHYSICAL THERAPY INITIAL EVALUATION ADULT - IMPAIRED TRANSFERS: SIT/STAND, REHAB EVAL
(1) Other Diagnosis
requires VCs from PT for proper hands placement during transfer/impaired balance/impaired postural control/decreased strength

## 2025-02-03 NOTE — PHYSICAL THERAPY INITIAL EVALUATION ADULT - GENERAL OBSERVATIONS, REHAB EVAL
Pt received semi supine in bed with +hep-lock, +EKG, NAD, OT Rosio present. Pt left as found, NAD, call bell in reach, +bed alarm, ORQUIDEA Corbin made aware.

## 2025-02-03 NOTE — OCCUPATIONAL THERAPY INITIAL EVALUATION ADULT - DIAGNOSIS, OT EVAL
Pt admitted for further w/u s/p dizziness and dysmetria, presents with impaired balance, strength deficits, and decreased activity tolerance.

## 2025-02-03 NOTE — PHYSICAL THERAPY INITIAL EVALUATION ADULT - BED MOBILITY LIMITATIONS, REHAB EVAL
decreased ability to use arms for pushing/pulling/decreased ability to use legs for bridging/pushing/impaired ability to control trunk for mobility
Yes...

## 2025-02-03 NOTE — CONSULT NOTE ADULT - SUBJECTIVE AND OBJECTIVE BOX
Patient is a 84y old  Male who presents with a chief complaint of Dizziness, dysmetria (02 Feb 2025 17:29)      HPI:  80 yo M, former smoker, w/ history of HLD, low potasium syndrome, and prostate cancer presenting with c/o dizziness and unsteadiness. LKW 02/02/25 at 11;00AM. Patient states dizziness suddenly started when he tried to roll over to the left side of the bed. Eventually got up and felt very unsteady, was leaning towards the right side even when using his cane. States he has never felt anything like this before. On arrival, /98. Dizziness worsened when moving from lying in CT scan to standing. NIHSS 2 for b/l UE dysmetria, but no nystagmus, vision changes noted. Shuffling gait noted, with come unsteadiness, but patient not leaning to either side. CTH with age-indeterminate L cerebellum infarct. CTP with possible 7 mL involving the left frontal brain parenchyma - however deemed as a false positive read, and CTA is unremarkable. Patient out of the window for TNK, no indication for thrombectomy since no LVO.   (02 Feb 2025 17:29)    PAST MEDICAL & SURGICAL HISTORY:  Hyperlipemia      Low potassium syndrome      CA of prostate      H/O eye surgery  6 surgeries of L eye        MEDICATIONS  (STANDING):  aspirin enteric coated 81 milliGRAM(s) Oral daily  atorvastatin 80 milliGRAM(s) Oral at bedtime  clopidogrel Tablet 75 milliGRAM(s) Oral daily  enoxaparin Injectable 40 milliGRAM(s) SubCutaneous every 24 hours  melatonin 5 milliGRAM(s) Oral at bedtime  pantoprazole    Tablet 40 milliGRAM(s) Oral before breakfast    MEDICATIONS  (PRN):          FAMILY HISTORY:  No pertinent family history in first degree relatives        CBC Full  -  ( 02 Feb 2025 16:08 )  WBC Count : 5.50 K/uL  RBC Count : 4.58 M/uL  Hemoglobin : 14.2 g/dL  Hematocrit : 45.0 %  Platelet Count - Automated : 182 K/uL  Mean Cell Volume : 98.3 fl  Mean Cell Hemoglobin : 31.0 pg  Mean Cell Hemoglobin Concentration : 31.6 g/dL  Auto Neutrophil # : 3.09 K/uL  Auto Lymphocyte # : 1.73 K/uL  Auto Monocyte # : 0.45 K/uL  Auto Eosinophil # : 0.18 K/uL  Auto Basophil # : 0.04 K/uL  Auto Neutrophil % : 56.1 %  Auto Lymphocyte % : 31.5 %  Auto Monocyte % : 8.2 %  Auto Eosinophil % : 3.3 %  Auto Basophil % : 0.7 %      02-03    139  |  105  |  12  ----------------------------<  93  3.8   |  25  |  1.23    Ca    9.2      03 Feb 2025 06:02  Phos  2.8     02-03  Mg     2.2     02-03        Urinalysis Basic - ( 03 Feb 2025 06:02 )    Color: x / Appearance: x / SG: x / pH: x  Gluc: 93 mg/dL / Ketone: x  / Bili: x / Urobili: x   Blood: x / Protein: x / Nitrite: x   Leuk Esterase: x / RBC: x / WBC x   Sq Epi: x / Non Sq Epi: x / Bacteria: x        Radiology :     < from: CT Brain Stroke Protocol (02.02.25 @ 16:12) >  ACC: [id number] EXAM:  CT PERFUSION W MAPS IC   ORDERED BY: ALEJO JOHNSON     ACC: [id number] EXAM:  CT ANGIO BRAIN (W)AW IC   ORDERED BY: ALEJO JOHNSON     ACC: [id number] EXAM:  CT BRAIN STROKE PROTOCOL   ORDERED BY: ALEJO JOHNSON     ACC: [id number] EXAM:  CT ANGIO NECK STROKE PROTCL IC   ORDERED BY:   ALEJO JOHNSON     PROCEDURE DATE:  02/02/2025          INTERPRETATION:  CLINICAL INFORMATION: vertigo, unsteady gait . Patient   is not a potential TNK candidate.    COMPARISON: Prior noncontrast CT brain the 20/2/2021    CONTRAST:  IV Contrast: NONE (accession [id number]), Isovue 370 (accession [id number])    80 cc administered  20 cc discarded  .    TECHNIQUE: CT of the head was performed with multiplanar reformats   without IV contrast. CT perfusion and CTA of the head and neck were   performed following the intravenous administration of IV contrast. MIP   reconstructions were performed on a separate workstation and reviewed.   RAPID software was utilized for perfusionanalysis.    FINDINGS:    CT BRAIN:    VENTRICLES AND SULCI: Age appropriate involutional changes.  INTRA-AXIAL: No intraparenchymal mass, acute hemorrhage, or midline   shift.  Small age-indeterminate ischemic event involving a left   cerebellar location (axial 2-6 and coronal 4-54).  EXTRA-AXIAL: Prominent appearance of the left sylvian fissure. Cannot   exclude an underlying arachnoid cyst in this location. Findings are   stable compared with prior. Basal cisterns are normal in appearance. Mild   deposition of calcified plaques in association with the distal   intracranial bilateral vertebral arteries and bilateral carotid siphons.    VISUALIZED SINUSES:  Clear.  TYMPANOMASTOID CAVITIES:  Clear.  VISUALIZED ORBITS: Status post right cataract surgery. Scleral buckle is   appreciated surrounding the left optic globe.  CALVARIUM: Intact.    MISCELLANEOUS: None.      CT PERFUSION:    TECHNICAL LIMITATIONS: Mild to moderate patient motion during image   acquisition.    CBF<30%/CORE INFARCTION: 7 mL involving the left frontal brain parenchyma.  TMAX>6s/UNDERPERFUSED TISSUE: 3 mL involving the left frontal brain   parenchyma.  MISMATCH VOLUME/TISSUE AT RISK: -4 mL  MISMATCH RATIO: 0.4    MISCELLANEOUS: None.      CTA NECK:    AORTIC ARCH AND VISUALIZED GREAT VESSELS: Within normal limits.    RIGHT:  COMMON CAROTID ARTERY: No significant stenosis to the carotid bifurcation.  INTERNAL CAROTID ARTERY: Deposition of calcified plaque along the   proximal right internal carotid artery. No significant stenosis based on   NASCET criteria.  VERTEBRAL ARTERY: Normal in course and caliber to the intracranial   circulation.    LEFT:  COMMON CAROTID ARTERY: No significant stenosis to the carotid bifurcation.  INTERNAL CAROTID ARTERY: Deposition of calcified plaque along the   proximal aspect of the left internal carotid artery. No significant   stenosis based on NASCET criteria.  VERTEBRAL ARTERY: Normal in course and caliber to the intracranial   circulation.    VISUALIZED LUNGS: Clear.    MISCELLANEOUS: None.    CAROTID STENOSIS REFERENCE: Percent (%) stenosis is expressed in terms of   NASCET Criteria. (NASCET = 100x1-(N/D)). N=greatest narrowing. D=normal   distal diameter - MILD = <50% stenosis. - MODERATE = 50-69% stenosis. -   SEVERE = 70-89% stenosis. - HAIRLINE/CRITICAL = 90-99% stenosis. -   OCCLUDED = 100% stenosis.      CTA HEAD:    INTERNAL CAROTID ARTERIES: Bilateral petrous, precavernous, cavernous,   and supraclinoid regions are patent. Deposition of calcified plaque in   association with the cavernous and supraclinoid bilateral internal   carotid arteries, with mild stenosis bilaterally in this location.    [place]: No aneurysm identified. Tiny aneurysms can be beyond   the resolution of CTA technique.    ANTERIOR CEREBRAL ARTERIES: No significant stenosis or occlusion.  MIDDLE CEREBRAL ARTERIES: No significant stenosis or occlusion.  POSTERIOR CEREBRAL ARTERIES: No significant stenosis or occlusion.   Presence of a left posterior communicating artery is incidentally noted.    DISTAL VERTEBRAL / BASILAR ARTERIES: No significant stenosis or occlusion.    VENOUS STRUCTURES: Visualized superficial and deep venous structures   appear patent.    MISCELLANEOUS: No other vascular abnormality is seen.      IMPRESSION:    CT HEAD:  Small age indeterminant left cerebellar ischemic event. No large arterial   distribution acute infarct. No acute intracranial hemorrhage.    Findings were discussed with EVA Nunn. 2/2/2025 4:15 PM by Dr. Behr Ventura with read back confirmation.    CT PERFUSION:  Technical limitations: Mild to moderate patient motion during image   acquisition.    Core infarction: 7 mL involving the left frontal brain parenchyma. Please   note this finding may represent a false positive as this area of   involvement appears to correlate with region is a prominent sulcation.  Penumbra / tissue at risk for active ischemia: No additional brain   parenchyma is at risk for active ischemia.    CTA NECK:  No evidence of significant stenosis or occlusion.    CTA HEAD:  1. No large vessel occlusion.  2. Deposition of calcified plaque in association with the cavernous and   supraclinoid bilateral internal carotid arteries, with mild stenosis   bilaterally in this location.  3. No aneurysm identified. Tiny aneurysms can be beyond the resolution of   CTA technique.         Review of Systems : per HPI         Vital Signs Last 24 Hrs  T(C): 36.5 (03 Feb 2025 05:24), Max: 36.6 (02 Feb 2025 16:09)  T(F): 97.7 (03 Feb 2025 05:24), Max: 97.8 (02 Feb 2025 16:09)  HR: 56 (03 Feb 2025 05:13) (56 - 95)  BP: 132/66 (03 Feb 2025 05:13) (127/68 - 179/84)  BP(mean): 94 (03 Feb 2025 05:13) (92 - 136)  RR: 18 (03 Feb 2025 05:13) (17 - 20)  SpO2: 96% (03 Feb 2025 05:13) (94% - 100%)    Parameters below as of 03 Feb 2025 05:13  Patient On (Oxygen Delivery Method): room air            Physical Exam:   84 y o manlying comfortably in semi Price's position , awake , alert , no acute complaints     Head: normocephalic , atraumatic    Eyes: PERRLA , EOMI , no nystagmus , sclera anicteric    ENT / FACE: neg nasal discharge , uvula midline , no oropharyngeal erythema / exudate    Neck: supple , negative JVD , negative carotid bruits , no thyromegaly    Chest: CTA bilaterally     Cardiovascular: regular rate and rhythm , neg murmurs / rubs / gallops    Abdomen: soft , non distended , no tenderness to palpation in all 4 quadrants ,  normal bowel sounds     Extremities: WWP , neg cyanosis /clubbing / edema     Neurologic Exam:     Alert and oriented to person , place , date/year , speech fluent w/o dysarthria , follows commands , recent and remote memory intact , repetition intact , comprehension intact ,  attention/concentration intact , fund of knowledge appropriate    Cranial Nerves:           II:                         pupils equal , round and reactive to light , visual fields intact         III/ IV/VI:             extraocular movements intact , neg nystagmus , neg ptosis        V:                        facial sensation intact , V1-3 normal        VII:                      face symmetric , no droop , normal eye closure and smile        VIII:                     hearing intact to finger rub bilaterally        IX and X:             no hoarseness , gag intact , palate/ uvula rise symmetrically        XI:                       SCM / trapezius strength intact bilateral        XII:                      no tongue deviation    Motor Exam:                  Right UE:                      5/5:           5/5:   wrist extensors/ flexors        5/5:   biceps        5/5:   triceps        5/5:   deltoid          negative pronator drift    Left UE:                        5/5:           5/5:   wrist extensors/ flexors        5/5:   biceps        5/5:   triceps        5/5:   deltoid          negative pronator drift    Right LE:           5/5: dorsiflexors         5/5: plantar flexors        5/5: quadriceps        5/5: hamstrings        5/5: hip flexors      Left LE:           5/5: dorsiflexors         5/5: plantar flexors        5/5: quadriceps        5/5: hamstrings        5/5: hip flexors    Sensation:         intact to light touch x 4 extremities                            no neglect or extinction on double simultaneous testing    DTR:           biceps/brachioradialis: equal                            patella/ankle: equal          neg Babinski     Coordination:            Finger to Nose:  + dysmetria bilaterally           Heel to Shin:    neg dysmetria bilaterally          DENNIS intact bilaterally      Gait:  not tested         PM&R Impression: admitted for dizziness and unsteady gait     -  CTH with age-indeterminate L cerebellum infarct , MRI pending        Recommendations / Plan:       1) Physical / Occupational therapy focusing on therapeutic exercises , equipment evaluation , bed mobility/transfer out of bed evaluation , progressive ambulation with assistive devices prn .    2) Current disposition plan recommendation:    pending functional progress

## 2025-02-03 NOTE — PHYSICAL THERAPY INITIAL EVALUATION ADULT - LEVEL OF INDEPENDENCE: GAIT, REHAB EVAL
Pt ambulated~ 30 feet with SC with mod A x 1, shuffling gait with decreased step length. Pt then ambulated~ 40 feet with rolling walker with min A x 1

## 2025-02-03 NOTE — PHYSICAL THERAPY INITIAL EVALUATION ADULT - ADDITIONAL COMMENTS
Pt states he lives with his spouse in an apt with elevator, no DAVID. Prior to admission, pt ambulated with straight cane and being independent with ADLs. Pt's wife has an aide. Pt mentions he has friends/family members always in and out of the apt, offering assistance as needed. Pt is R hand dominant Pt states he lives with his spouse in an apt with elevator, no DAVID. Prior to admission, pt ambulated with straight cane and being independent with ADLs. Pt's wife has an aide. Pt mentions he has friends/family members always in and out of the apt, offering assistance as needed. Pt is R hand dominant and wears reading glasses.

## 2025-02-03 NOTE — CONSULT NOTE ADULT - ASSESSMENT
Neurology    81 y o M, former smoker, w/ history of HLD, low potasium syndrome, and prostate cancer presenting with c/o dizziness and unsteadiness. LKW 02/02/25 at 11;00AM. Patient states dizziness suddenly started when he tried to roll over to the left side of the bed. Eventually got up and felt very unsteady, was leaning towards the right side even when using his cane. States he has never felt anything like this before. On arrival, /98. Dizziness worsened when moving from lying in CT scan to standing. NIHSS 2 for b/l UE dysmetria, but no nystagmus, vision changes noted. Shuffling gait noted, with come unsteadiness, but patient not leaning to either side. CTH with age-indeterminate L cerebellum infarct. CTP with possible 7 mL involving the left frontal brain parenchyma - however deemed as a false positive read, and CTA is unremarkable. Patient out of the window for TNK, no indication for thrombectomy since no LVO. Admitted to stroke tele for further workup    Neuro  #CVA workup  - continue aspirin 81mg and plavix 75mg daily  - continue atorvastatin 80mg daily  - q4hr stroke neuro checks and vitals  - obtain MRI Brain without contrast short stroke  - Stroke Code HCT Results: age-indeterminate L cerebellum infarct  - Stroke Code CTA Results: no LVO or severe stenosis  - Stroke education    Cards  #HTN  - Goal -180  - obtain TTE      #HLD  - high dose statin as above in CVA  - LDL results: pending    Pulm  - call provider if SPO2 < 94%    GI  #Nutrition/Fluids/Electrolytes   - replete K<4 and Mg <2  - Diet: DASH  - IVF: none     Renal  - daily BMP    Infectious Disease  - afebrile on admission    Endocrine  #DM  - A1C results: pending  - ISS    - TSH results: pending    DVT Prophylaxis  - lovenox sq for DVT prophylaxis   - SCDs for DVT prophylaxis     Dispo: pending PT/OT eval      Case discussed with Dr. Morales

## 2025-02-03 NOTE — OCCUPATIONAL THERAPY INITIAL EVALUATION ADULT - ORIENTATION, REHAB EVAL
oriented to person, place, time and situation [place] Pregnancy And Lactation Text: This medication is not safe during pregnancy. It is unknown if this medication is excreted in breast milk.

## 2025-02-03 NOTE — PROGRESS NOTE ADULT - SUBJECTIVE AND OBJECTIVE BOX
Neurology Stroke Progress Note    INTERVAL HPI/OVERNIGHT EVENTS:  Patient seen and examined.  number ______ used.    MEDICATIONS  (STANDING):  aspirin enteric coated 81 milliGRAM(s) Oral daily  atorvastatin 80 milliGRAM(s) Oral at bedtime  clopidogrel Tablet 75 milliGRAM(s) Oral daily  enoxaparin Injectable 40 milliGRAM(s) SubCutaneous every 24 hours  melatonin 5 milliGRAM(s) Oral at bedtime  pantoprazole    Tablet 40 milliGRAM(s) Oral before breakfast    MEDICATIONS  (PRN):  meclizine 12.5 milliGRAM(s) Oral every 6 hours PRN Dizziness      Allergies    No Known Allergies    Intolerances        Vital Signs Last 24 Hrs  T(C): 36.5 (03 Feb 2025 09:31), Max: 36.6 (02 Feb 2025 16:09)  T(F): 97.7 (03 Feb 2025 09:31), Max: 97.8 (02 Feb 2025 16:09)  HR: 58 (03 Feb 2025 08:55) (56 - 95)  BP: 151/71 (03 Feb 2025 08:55) (127/68 - 179/84)  BP(mean): 102 (03 Feb 2025 08:55) (92 - 136)  RR: 19 (03 Feb 2025 08:55) (17 - 20)  SpO2: 97% (03 Feb 2025 08:55) (94% - 100%)    Parameters below as of 03 Feb 2025 08:55  Patient On (Oxygen Delivery Method): room air        Physical exam:  General: No acute distress, awake and alert  Eyes: Anicteric sclerae, moist conjunctivae, see below for CNs  Neck: trachea midline, FROM, supple, no thyromegaly or lymphadenopathy  Cardiovascular: Regular rate and rhythm, no murmurs, rubs, or gallops. No carotid bruits.   Pulmonary: Anterior breath sounds clear bilaterally, no crackles or wheezing. No use of accessory muscles  GI: Abdomen soft, non-distended, non-tender  Extremities: Radial and DP pulses +2, no edema    Neurologic:  -Mental status: Awake, alert, oriented to person, place, and time. Speech is fluent with intact naming, repetition, and comprehension, no dysarthria. Recent and remote memory intact. Follows commands. Attention/concentration intact. Fund of knowledge appropriate.  -Cranial nerves:   II: Visual fields are full to confrontation.  III, IV, VI: Extraocular movements are intact without nystagmus. Pupils equally round and reactive to light  V:  Facial sensation V1-V3 equal and intact   VII: Face is symmetric with normal eye closure and smile  VIII: Hearing is bilaterally intact to finger rub  IX, X: Uvula is midline and soft palate rises symmetrically  XI: Head turning and shoulder shrug are intact.  XII: Tongue protrudes midline  Motor: Normal bulk and tone. No pronator drift. Strength bilateral upper extremity 5/5, bilateral lower extremities 5/5.  Rapid alternating movements intact and symmetric  Sensation: Intact to light touch bilaterally. No neglect or extinction on double simultaneous testing.  Coordination: No dysmetria on finger-to-nose and heel-to-shin bilaterally  Reflexes: Downgoing toes bilaterally   Gait: Narrow gait and steady    LABS:                        12.7   5.01  )-----------( 153      ( 03 Feb 2025 10:00 )             40.7     02-03    139  |  105  |  12  ----------------------------<  93  3.8   |  25  |  1.23    Ca    9.2      03 Feb 2025 06:02  Phos  2.8     02-03  Mg     2.2     02-03      PT/INR - ( 02 Feb 2025 16:08 )   PT: 10.0 sec;   INR: 0.85          PTT - ( 02 Feb 2025 16:08 )  PTT:28.8 sec  Urinalysis Basic - ( 03 Feb 2025 06:02 )    Color: x / Appearance: x / SG: x / pH: x  Gluc: 93 mg/dL / Ketone: x  / Bili: x / Urobili: x   Blood: x / Protein: x / Nitrite: x   Leuk Esterase: x / RBC: x / WBC x   Sq Epi: x / Non Sq Epi: x / Bacteria: x        RADIOLOGY & ADDITIONAL TESTS:     Neurology Stroke Progress Note    80 yo M, former smoker, w/ history of HLD, low potasium syndrome, and prostate cancer presenting with c/o dizziness and unsteadiness. LKW 02/02/25 at 11;00AM. Patient states dizziness suddenly started when he tried to roll over to the left side of the bed. Eventually got up and felt very unsteady, was leaning towards the right side even when using his cane. States he has never felt anything like this before. On arrival, /98. Dizziness worsened when moving from lying in CT scan to standing. NIHSS 2 for b/l UE dysmetria, but no nystagmus, vision changes noted. Shuffling gait noted, with come unsteadiness, but patient not leaning to either side. CTH with age-indeterminate L cerebellum infarct. CTP with possible 7 mL involving the left frontal brain parenchyma - however deemed as a false positive read, and CTA is unremarkable. Patient out of the window for TNK, no indication for thrombectomy since no LVO.      INTERVAL HPI/OVERNIGHT EVENTS:  Patient seen and examined. Still feels left turning dizziness, even when in bed. No complaints otherwise.     MEDICATIONS  (STANDING):  aspirin enteric coated 81 milliGRAM(s) Oral daily  atorvastatin 80 milliGRAM(s) Oral at bedtime  clopidogrel Tablet 75 milliGRAM(s) Oral daily  enoxaparin Injectable 40 milliGRAM(s) SubCutaneous every 24 hours  melatonin 5 milliGRAM(s) Oral at bedtime  pantoprazole    Tablet 40 milliGRAM(s) Oral before breakfast    MEDICATIONS  (PRN):  meclizine 12.5 milliGRAM(s) Oral every 6 hours PRN Dizziness    Allergies    No Known Allergies    Intolerances    Vital Signs Last 24 Hrs  T(C): 36.5 (03 Feb 2025 09:31), Max: 36.6 (02 Feb 2025 16:09)  T(F): 97.7 (03 Feb 2025 09:31), Max: 97.8 (02 Feb 2025 16:09)  HR: 58 (03 Feb 2025 08:55) (56 - 95)  BP: 151/71 (03 Feb 2025 08:55) (127/68 - 179/84)  BP(mean): 102 (03 Feb 2025 08:55) (92 - 136)  RR: 19 (03 Feb 2025 08:55) (17 - 20)  SpO2: 97% (03 Feb 2025 08:55) (94% - 100%)    Parameters below as of 03 Feb 2025 08:55  Patient On (Oxygen Delivery Method): room air    Physical exam:  General: No acute distress, awake and alert.  Eyes: Anicteric sclerae, moist conjunctivae, see below for CNs  Neck: trachea midline  Cardiovascular: Regular rate and rhythm  Pulmonary: No use of accessory muscles  GI: Abdomen soft, non-distended, non-tender  Extremities: No edema    Neurologic:  -Mental status: Awake, alert, oriented to person, place, and time. Speech is fluent with intact naming, repetition, and comprehension, no dysarthria. Recent and remote memory intact, does not recall medication or PMH, defers to daughter. Has some difficulty following commands (requires redirection at times). Attention/concentration intact. Fund of knowledge appropriate.  -Cranial nerves:   II: Visual fields are full to confrontation.  III, IV, VI: Extraocular movements are intact without nystagmus. Pupils equally round and reactive to light  V:  Facial sensation V1-V3 equal and intact   VII: Face is symmetric with normal eye closure and smile  VIII: Hearing is bilaterally intact to voice  IX, X: Uvula is midline and soft palate rises symmetrically  XI: Head turning and shoulder shrug are intact. Pt deferred turning to left as it induces dizziness.   XII: Tongue protrudes midline  Motor: Normal bulk and tone. No pronator drift. Strength bilateral upper extremity 5/5, bilateral lower extremities 5/5.  Rapid alternating movements intact and symmetric  Sensation: Intact to light touch bilaterally. No neglect or extinction on double simultaneous testing.  Coordination: bilateral UE dysmetria on finger-to-nose, R > L, normal heel-to-shin bilaterally  Gait: deferred (normally uses cane)    LABS:                        12.7   5.01  )-----------( 153      ( 03 Feb 2025 10:00 )             40.7     02-03    139  |  105  |  12  ----------------------------<  93  3.8   |  25  |  1.23    Ca    9.2      03 Feb 2025 06:02  Phos  2.8     02-03  Mg     2.2     02-03      PT/INR - ( 02 Feb 2025 16:08 )   PT: 10.0 sec;   INR: 0.85          PTT - ( 02 Feb 2025 16:08 )  PTT:28.8 sec  Urinalysis Basic - ( 03 Feb 2025 06:02 )    Color: x / Appearance: x / SG: x / pH: x  Gluc: 93 mg/dL / Ketone: x  / Bili: x / Urobili: x   Blood: x / Protein: x / Nitrite: x   Leuk Esterase: x / RBC: x / WBC x   Sq Epi: x / Non Sq Epi: x / Bacteria: x        RADIOLOGY & ADDITIONAL TESTS:     Neurology Stroke Progress Note    INTERVAL HPI/OVERNIGHT EVENTS:  Patient seen and examined. Still feels left turning dizziness, even when in bed. No complaints otherwise.     MEDICATIONS  (STANDING):  aspirin enteric coated 81 milliGRAM(s) Oral daily  atorvastatin 80 milliGRAM(s) Oral at bedtime  clopidogrel Tablet 75 milliGRAM(s) Oral daily  enoxaparin Injectable 40 milliGRAM(s) SubCutaneous every 24 hours  melatonin 5 milliGRAM(s) Oral at bedtime  pantoprazole    Tablet 40 milliGRAM(s) Oral before breakfast    MEDICATIONS  (PRN):  meclizine 12.5 milliGRAM(s) Oral every 6 hours PRN Dizziness    Allergies    No Known Allergies    Intolerances    Vital Signs Last 24 Hrs  T(C): 36.5 (03 Feb 2025 09:31), Max: 36.6 (02 Feb 2025 16:09)  T(F): 97.7 (03 Feb 2025 09:31), Max: 97.8 (02 Feb 2025 16:09)  HR: 58 (03 Feb 2025 08:55) (56 - 95)  BP: 151/71 (03 Feb 2025 08:55) (127/68 - 179/84)  BP(mean): 102 (03 Feb 2025 08:55) (92 - 136)  RR: 19 (03 Feb 2025 08:55) (17 - 20)  SpO2: 97% (03 Feb 2025 08:55) (94% - 100%)    Parameters below as of 03 Feb 2025 08:55  Patient On (Oxygen Delivery Method): room air      Neurologic:  -Mental status: Awake, alert, oriented to person, place, and time. Speech is fluent with intact naming, repetition, and comprehension, no dysarthria. Follows 2-step and cross commands.   -Cranial nerves:   II: Visual fields are full to confrontation in R eye. L eye blind.  III, IV, VI: Extraocular movements are intact without nystagmus however needs repeated prompting.    V:  Facial sensation V1-V3 equal and intact   VII: Face is symmetric with normal eye closure and smile  VIII: Hearing is bilaterally intact to voice  Motor: Normal bulk and tone. No pronator drift. Strength bilateral upper extremity 5/5, bilateral lower extremities 5/5.  Sensation: Intact to light touch bilaterally. No neglect or extinction on double simultaneous testing.  Coordination: LUE/LLE dysmetria  Gait: deferred    LABS:                        12.7   5.01  )-----------( 153      ( 03 Feb 2025 10:00 )             40.7     02-03    139  |  105  |  12  ----------------------------<  93  3.8   |  25  |  1.23    Ca    9.2      03 Feb 2025 06:02  Phos  2.8     02-03  Mg     2.2     02-03      PT/INR - ( 02 Feb 2025 16:08 )   PT: 10.0 sec;   INR: 0.85          PTT - ( 02 Feb 2025 16:08 )  PTT:28.8 sec  Urinalysis Basic - ( 03 Feb 2025 06:02 )    Color: x / Appearance: x / SG: x / pH: x  Gluc: 93 mg/dL / Ketone: x  / Bili: x / Urobili: x   Blood: x / Protein: x / Nitrite: x   Leuk Esterase: x / RBC: x / WBC x   Sq Epi: x / Non Sq Epi: x / Bacteria: x        RADIOLOGY & ADDITIONAL TESTS:  reviewed   Neurology Stroke Progress Note    INTERVAL HPI/OVERNIGHT EVENTS:  Patient seen and examined. Still feels left turning dizziness, even when in bed. No complaints otherwise.     MEDICATIONS  (STANDING):  aspirin enteric coated 81 milliGRAM(s) Oral daily  atorvastatin 80 milliGRAM(s) Oral at bedtime  clopidogrel Tablet 75 milliGRAM(s) Oral daily  enoxaparin Injectable 40 milliGRAM(s) SubCutaneous every 24 hours  melatonin 5 milliGRAM(s) Oral at bedtime  pantoprazole    Tablet 40 milliGRAM(s) Oral before breakfast    MEDICATIONS  (PRN):  meclizine 12.5 milliGRAM(s) Oral every 6 hours PRN Dizziness    Allergies    No Known Allergies    Intolerances    Vital Signs Last 24 Hrs  T(C): 36.5 (03 Feb 2025 09:31), Max: 36.6 (02 Feb 2025 16:09)  T(F): 97.7 (03 Feb 2025 09:31), Max: 97.8 (02 Feb 2025 16:09)  HR: 58 (03 Feb 2025 08:55) (56 - 95)  BP: 151/71 (03 Feb 2025 08:55) (127/68 - 179/84)  BP(mean): 102 (03 Feb 2025 08:55) (92 - 136)  RR: 19 (03 Feb 2025 08:55) (17 - 20)  SpO2: 97% (03 Feb 2025 08:55) (94% - 100%)    Parameters below as of 03 Feb 2025 08:55  Patient On (Oxygen Delivery Method): room air      Neurologic:  -Mental status: Awake, alert, oriented to person, place, and time. Speech is fluent with intact naming, repetition, and comprehension, no dysarthria. Follows 2-step and cross commands.   -Cranial nerves:   II: Visual fields are full to confrontation in R eye. L eye blind.  III, IV, VI: Extraocular movements are intact without nystagmus however needs repeated prompting.  L pupil very sluggish. R pupil reactive.  V:  Facial sensation V1-V3 equal and intact   VII: Face is symmetric with normal eye closure and smile  VIII: Hearing is bilaterally intact to voice  Motor: Normal bulk and tone. No pronator drift. Strength bilateral upper extremity 5/5, bilateral lower extremities 5/5.  Sensation: Intact to light touch bilaterally. No neglect or extinction on double simultaneous testing.  Coordination: LUE/LLE dysmetria  Gait: deferred    LABS:                        12.7   5.01  )-----------( 153      ( 03 Feb 2025 10:00 )             40.7     02-03    139  |  105  |  12  ----------------------------<  93  3.8   |  25  |  1.23    Ca    9.2      03 Feb 2025 06:02  Phos  2.8     02-03  Mg     2.2     02-03      PT/INR - ( 02 Feb 2025 16:08 )   PT: 10.0 sec;   INR: 0.85          PTT - ( 02 Feb 2025 16:08 )  PTT:28.8 sec  Urinalysis Basic - ( 03 Feb 2025 06:02 )    Color: x / Appearance: x / SG: x / pH: x  Gluc: 93 mg/dL / Ketone: x  / Bili: x / Urobili: x   Blood: x / Protein: x / Nitrite: x   Leuk Esterase: x / RBC: x / WBC x   Sq Epi: x / Non Sq Epi: x / Bacteria: x        RADIOLOGY & ADDITIONAL TESTS:  reviewed

## 2025-02-03 NOTE — OCCUPATIONAL THERAPY INITIAL EVALUATION ADULT - MODIFIED CLINICAL TEST OF SENSORY INTEGRATION IN BALANCE TEST
Pt able to ambulate 30 feet using SC with mod A 2/2 shuffling gait and unsteadiness. Therefore, pt trialed RW for 50 feet and required min A. Pt demonstrated fairly steady gait, however decreased step length/vero and impaired posture.

## 2025-02-03 NOTE — PHYSICAL THERAPY INITIAL EVALUATION ADULT - MANUAL MUSCLE TESTING RESULTS, REHAB EVAL
b/l UEs >3+/5 t/o, b/l LEs~5/5 t/o except b/l hip flexion~4+/5 Class I (easy) - visualization of the soft palate, fauces, uvula, and both anterior and posterior pillars

## 2025-02-03 NOTE — PHYSICAL THERAPY INITIAL EVALUATION ADULT - PERTINENT HX OF CURRENT PROBLEM, REHAB EVAL
Pt is an 83 yo male presenting with c/o dizziness and unsteadiness. LKW 02/02/25 at 11:00AM. Patient states dizziness suddenly started when he tried to roll over to the left side of the bed. On arrival, /98. Dizziness worsened when moving from lying in CT scan to standing. NIHSS 2 for b/l UE dysmetria. Shuffling gait noted. CTH with age-indeterminate L cerebellum infarct. CTP with possible 7 mL involving the left frontal brain parenchyma - however deemed as a false positive read, and CTA is unremarkable. Patient out of the window for TNK, no indication for thrombectomy since no LVO. Admitted to stroke tele for further workup.

## 2025-02-03 NOTE — PROGRESS NOTE ADULT - ASSESSMENT
82 yo M, former smoker, w/ history of HLD, low potasium syndrome, and prostate cancer presenting with c/o dizziness and unsteadiness. LKW 02/02/25 at 11;00AM. Patient states dizziness suddenly started when he tried to roll over to the left side of the bed. Eventually got up and felt very unsteady, was leaning towards the right side even when using his cane. States he has never felt anything like this before. On arrival, /98. Dizziness worsened when moving from lying in CT scan to standing. NIHSS 2 for b/l UE dysmetria, but no nystagmus, vision changes noted. Shuffling gait noted, with come unsteadiness, but patient not leaning to either side. CTH with age-indeterminate L cerebellum infarct. CTP with possible 7 mL involving the left frontal brain parenchyma - however deemed as a false positive read, and CTA is unremarkable. Patient out of the window for TNK, no indication for thrombectomy since no LVO. Admitted to stroke tele for further workup    Neuro  #CVA workup  - continue aspirin 81mg and plavix 75mg daily  - continue atorvastatin 80mg daily  - q4hr stroke neuro checks and vitals  - obtain MRI Brain without contrast short stroke  - Stroke Code HCT Results: age-indeterminate L cerebellum infarct  - Stroke Code CTA Results: no LVO or severe stenosis  - Stroke education    Cards  #HTN  - Goal -180  - obtain TTE      #HLD  - high dose statin as above in CVA  - LDL results: pending    Pulm  - call provider if SPO2 < 94%    GI  #Nutrition/Fluids/Electrolytes   - replete K<4 and Mg <2  - Diet: DASH  - IVF: none     Renal  - daily BMP    Infectious Disease  - afebrile on admission    Endocrine  #DM  - A1C results: 5.3  - ISS    - TSH results: 1.860    DVT Prophylaxis  - lovenox sq for DVT prophylaxis   - SCDs for DVT prophylaxis     Dispo: pending PT/OT eval      Case discussed with Dr. Morales         80 yo M, former smoker, w/ history of HLD, low potasium syndrome, and prostate cancer presenting with c/o dizziness and unsteadiness. LKW 02/02/25 at 11:00AM. Patient states dizziness suddenly started when he tried to roll over to the left side of the bed. On arrival, /98. Dizziness worsened when moving from lying in CT scan to standing. NIHSS 2 for b/l UE dysmetria. Shuffling gait noted. CTH with age-indeterminate L cerebellum infarct. CTP with possible 7 mL involving the left frontal brain parenchyma - however deemed as a false positive read, and CTA is unremarkable. Patient out of the window for TNK, no indication for thrombectomy since no LVO. Admitted to stroke tele for further workup.    Neuro  #CVA workup  - continue aspirin 81mg and plavix 75mg daily  - continue atorvastatin 80mg daily  - q4hr stroke neuro checks and vitals  - obtain MRI Brain without contrast short stroke  - Stroke Code HCT Results: age-indeterminate L cerebellum infarct  - Stroke Code CTA Results: no LVO or severe stenosis  - Stroke education    Cards  #HTN  - Goal -180  - obtain TTE      #HLD  - high dose statin as above in CVA  - LDL results: 89    Pulm  - call provider if SPO2 < 94%    GI  #Nutrition/Fluids/Electrolytes   - replete K<4 and Mg <2  - Diet: DASH  - IVF: none     Renal  - daily BMP    Infectious Disease  - afebrile on admission    Endocrine  - A1C results: 5.3  - TSH results: 1.860    DVT Prophylaxis  - lovenox sq for DVT prophylaxis   - SCDs for DVT prophylaxis     Dispo: pending PT/OT eval      seen and discussed with Dr. Morales         80 yo M, former smoker, w/ history of HLD, low potasium syndrome, and prostate cancer presenting with c/o dizziness and unsteadiness. LKW 02/02/25 at 11:00AM. Patient states dizziness suddenly started when he tried to roll over to the left side of the bed. On arrival, /98. Dizziness worsened when moving from lying in CT scan to standing. NIHSS 2 for b/l UE dysmetria. Shuffling gait noted. CTH with age-indeterminate L cerebellum infarct. CTP with possible 7 mL involving the left frontal brain parenchyma - however deemed as a false positive read, and CTA is unremarkable. Patient out of the window for TNK, no indication for thrombectomy since no LVO. Admitted to stroke tele for further workup.    Neuro  #CVA workup  - continue aspirin 81mg and plavix 75mg daily  - continue atorvastatin 80mg daily  - q4hr stroke neuro checks and vitals  - obtain MRI Brain without contrast short stroke  - Stroke Code HCT Results: age-indeterminate L cerebellum infarct  - Stroke Code CTA Results: no LVO or severe stenosis  - Stroke education  - blood cx ordered as patient has had recent dental work    Cards  #HTN  - Goal -180  - obtain TTE      #HLD  - high dose statin as above in CVA  - LDL results: 89    Pulm  - call provider if SPO2 < 94%    GI  #Nutrition/Fluids/Electrolytes   - replete K<4 and Mg <2  - Diet: DASH  - IVF: none     Renal  - daily BMP    Infectious Disease  - afebrile on admission    Endocrine  - A1C results: 5.3  - TSH results: 1.860    DVT Prophylaxis  - lovenox sq for DVT prophylaxis   - SCDs for DVT prophylaxis     Dispo: pending PT/OT eval      seen and discussed with Dr. Morales         80 yo M, former smoker, w/ history of HLD, low potasium syndrome, and prostate cancer presenting with c/o dizziness and unsteadiness. LKW 02/02/25 at 11:00AM. Patient states dizziness suddenly started when he tried to roll over to the left side of the bed. On arrival, /98. Dizziness worsened when moving from lying in CT scan to standing. NIHSS 2 for b/l UE dysmetria. Shuffling gait noted. CTH with age-indeterminate L cerebellum infarct. CTP with possible 7 mL involving the left frontal brain parenchyma - however deemed as a false positive read, and CTA is unremarkable. Patient out of the window for TNK, no indication for thrombectomy since no LVO. Admitted to stroke tele for further workup.    Neuro  #CVA workup  - continue aspirin 81mg and plavix 75mg daily  - continue atorvastatin 80mg daily  - q4hr stroke neuro checks and vitals  - obtain MRI Brain without contrast short stroke  - Stroke Code HCT Results: age-indeterminate L cerebellum infarct  - Stroke Code CTA Results: no LVO or severe stenosis  - Stroke education  - blood cx ordered as patient has had recent dental work, recently completed a 10 day course of abx    Cards  #HTN  - Goal -180  - obtain TTE      #HLD  - high dose statin as above in CVA  - LDL results: 89    Pulm  - call provider if SPO2 < 94%    GI  #Nutrition/Fluids/Electrolytes   - replete K<4 and Mg <2  - Diet: DASH  - IVF: none     Renal  - daily BMP    Infectious Disease  - afebrile on admission    Endocrine  - A1C results: 5.3  - TSH results: 1.860    DVT Prophylaxis  - lovenox sq for DVT prophylaxis   - SCDs for DVT prophylaxis     Dispo: pending PT/OT eval      seen and discussed with Dr. Morales

## 2025-02-03 NOTE — OCCUPATIONAL THERAPY INITIAL EVALUATION ADULT - SENSORY TESTS
Cranial Nerves II - XII: V: facial sensation intact to light touch V1-V3 b/l VII: no ptosis, no facial droop, symmetric eyebrow raise and closure VIII: hearing intact to finger rub b/l  XI: head turning and shoulder shrug intact b/l XII: tongue protrusion midline

## 2025-02-03 NOTE — OCCUPATIONAL THERAPY INITIAL EVALUATION ADULT - PERTINENT HX OF CURRENT PROBLEM, REHAB EVAL
82 yo M, former smoker, w/ history of HLD, low potasium syndrome, and prostate cancer presenting with c/o dizziness and unsteadiness. LKW 02/02/25 at 11;00AM. Patient states dizziness suddenly started when he tried to roll over to the left side of the bed. Eventually got up and felt very unsteady, was leaning towards the right side even when using his cane. States he has never felt anything like this before. On arrival, /98. Dizziness worsened when moving from lying in CT scan to standing. NIHSS 2 for b/l UE dysmetria, but no nystagmus, vision changes noted. Shuffling gait noted, with come unsteadiness, but patient not leaning to either side. CTH with age-indeterminate L cerebellum infarct. CTP with possible 7 mL involving the left frontal brain parenchyma - however deemed as a false positive read, and CTA is unremarkable. Patient out of the window for TNK, no indication for thrombectomy since no LVO.

## 2025-02-03 NOTE — PHYSICAL THERAPY INITIAL EVALUATION ADULT - GAIT DEVIATIONS NOTED, PT EVAL
slightly unsteady gait, shuffling gait while ambulating with cane, decreased heel strike and hip flexion bilaterally./decreased vero/decreased step length

## 2025-02-03 NOTE — PROGRESS NOTE ADULT - NS ATTEND AMEND GEN_ALL_CORE FT
The patient is an 84 year old male with a PMH of HLD and prostate cancer admitted with intermittent vertigo with head CT showing L cerebellar infarct. CTA w/o significant stenosis. Plan for MRI brain, TTE for now. May need PEG/ILR. DAPT statin.

## 2025-02-03 NOTE — OCCUPATIONAL THERAPY INITIAL EVALUATION ADULT - ADDITIONAL COMMENTS
Pt states he lives with his wife in an elevator accessible apartment, no DAVID. Pt reports ambulating with a cane and being independent with ADLs. Pt's wife has a HHA and relies on pt as a caregiver. Pt mentions he has friends/family members always in and out of the house, offering assistance as needed. Pt also mentions when ambulating in community, someone from the neighborhood will always assist him to walk. Pt states he lives with his wife in an elevator accessible apartment, no DAVID. Pt reports ambulating with a cane and being independent with ADLs. Pt's wife has a HHA and relies on pt as a caregiver. Pt mentions he has friends/family members always in and out of the house, offering assistance as needed. Pt also mentions when ambulating in community, someone from the neighborhood will always assist him to walk. Pt wears reading glasses and is R hand dominant.

## 2025-02-04 ENCOUNTER — TRANSCRIPTION ENCOUNTER (OUTPATIENT)
Age: 85
End: 2025-02-04

## 2025-02-04 ENCOUNTER — RESULT REVIEW (OUTPATIENT)
Age: 85
End: 2025-02-04

## 2025-02-04 VITALS
OXYGEN SATURATION: 98 % | HEART RATE: 60 BPM | SYSTOLIC BLOOD PRESSURE: 142 MMHG | DIASTOLIC BLOOD PRESSURE: 72 MMHG | RESPIRATION RATE: 17 BRPM

## 2025-02-04 LAB
ALBUMIN SERPL ELPH-MCNC: 3.9 G/DL — SIGNIFICANT CHANGE UP (ref 3.3–5)
ALP SERPL-CCNC: 77 U/L — SIGNIFICANT CHANGE UP (ref 40–120)
ALT FLD-CCNC: 9 U/L — LOW (ref 10–45)
ANION GAP SERPL CALC-SCNC: 10 MMOL/L — SIGNIFICANT CHANGE UP (ref 5–17)
AST SERPL-CCNC: 14 U/L — SIGNIFICANT CHANGE UP (ref 10–40)
BASOPHILS # BLD AUTO: 0 K/UL — SIGNIFICANT CHANGE UP (ref 0–0.2)
BASOPHILS NFR BLD AUTO: 0 % — SIGNIFICANT CHANGE UP (ref 0–2)
BILIRUB SERPL-MCNC: 0.7 MG/DL — SIGNIFICANT CHANGE UP (ref 0.2–1.2)
BUN SERPL-MCNC: 15 MG/DL — SIGNIFICANT CHANGE UP (ref 7–23)
CALCIUM SERPL-MCNC: 9.4 MG/DL — SIGNIFICANT CHANGE UP (ref 8.4–10.5)
CHLORIDE SERPL-SCNC: 105 MMOL/L — SIGNIFICANT CHANGE UP (ref 96–108)
CO2 SERPL-SCNC: 25 MMOL/L — SIGNIFICANT CHANGE UP (ref 22–31)
CREAT SERPL-MCNC: 1.3 MG/DL — SIGNIFICANT CHANGE UP (ref 0.5–1.3)
CRP SERPL-MCNC: <3 MG/L — SIGNIFICANT CHANGE UP (ref 0–4)
EGFR: 54 ML/MIN/1.73M2 — LOW
EOSINOPHIL # BLD AUTO: 0.14 K/UL — SIGNIFICANT CHANGE UP (ref 0–0.5)
EOSINOPHIL NFR BLD AUTO: 2.6 % — SIGNIFICANT CHANGE UP (ref 0–6)
ERYTHROCYTE [SEDIMENTATION RATE] IN BLOOD: 9 MM/HR — SIGNIFICANT CHANGE UP
GLUCOSE SERPL-MCNC: 93 MG/DL — SIGNIFICANT CHANGE UP (ref 70–99)
HCT VFR BLD CALC: 39.8 % — SIGNIFICANT CHANGE UP (ref 39–50)
HGB BLD-MCNC: 12.9 G/DL — LOW (ref 13–17)
LYMPHOCYTES # BLD AUTO: 2.23 K/UL — SIGNIFICANT CHANGE UP (ref 1–3.3)
LYMPHOCYTES # BLD AUTO: 40.4 % — SIGNIFICANT CHANGE UP (ref 13–44)
MAGNESIUM SERPL-MCNC: 2.3 MG/DL — SIGNIFICANT CHANGE UP (ref 1.6–2.6)
MCHC RBC-ENTMCNC: 32 PG — SIGNIFICANT CHANGE UP (ref 27–34)
MCHC RBC-ENTMCNC: 32.4 G/DL — SIGNIFICANT CHANGE UP (ref 32–36)
MCV RBC AUTO: 98.8 FL — SIGNIFICANT CHANGE UP (ref 80–100)
MONOCYTES # BLD AUTO: 0.49 K/UL — SIGNIFICANT CHANGE UP (ref 0–0.9)
MONOCYTES NFR BLD AUTO: 8.8 % — SIGNIFICANT CHANGE UP (ref 2–14)
NEUTROPHILS # BLD AUTO: 2.66 K/UL — SIGNIFICANT CHANGE UP (ref 1.8–7.4)
NEUTROPHILS NFR BLD AUTO: 48.2 % — SIGNIFICANT CHANGE UP (ref 43–77)
PHOSPHATE SERPL-MCNC: 2.8 MG/DL — SIGNIFICANT CHANGE UP (ref 2.5–4.5)
PLATELET # BLD AUTO: 142 K/UL — LOW (ref 150–400)
POTASSIUM SERPL-MCNC: 4.1 MMOL/L — SIGNIFICANT CHANGE UP (ref 3.5–5.3)
POTASSIUM SERPL-SCNC: 4.1 MMOL/L — SIGNIFICANT CHANGE UP (ref 3.5–5.3)
PROT SERPL-MCNC: 6.1 G/DL — SIGNIFICANT CHANGE UP (ref 6–8.3)
RBC # BLD: 4.03 M/UL — LOW (ref 4.2–5.8)
RBC # FLD: 13.4 % — SIGNIFICANT CHANGE UP (ref 10.3–14.5)
SODIUM SERPL-SCNC: 140 MMOL/L — SIGNIFICANT CHANGE UP (ref 135–145)
WBC # BLD: 5.52 K/UL — SIGNIFICANT CHANGE UP (ref 3.8–10.5)
WBC # FLD AUTO: 5.52 K/UL — SIGNIFICANT CHANGE UP (ref 3.8–10.5)

## 2025-02-04 PROCEDURE — 99291 CRITICAL CARE FIRST HOUR: CPT | Mod: 25

## 2025-02-04 PROCEDURE — 70498 CT ANGIOGRAPHY NECK: CPT | Mod: MC

## 2025-02-04 PROCEDURE — ZZZZZ: CPT

## 2025-02-04 PROCEDURE — 85610 PROTHROMBIN TIME: CPT

## 2025-02-04 PROCEDURE — 84132 ASSAY OF SERUM POTASSIUM: CPT

## 2025-02-04 PROCEDURE — 97165 OT EVAL LOW COMPLEX 30 MIN: CPT

## 2025-02-04 PROCEDURE — 70450 CT HEAD/BRAIN W/O DYE: CPT | Mod: MC

## 2025-02-04 PROCEDURE — 84443 ASSAY THYROID STIM HORMONE: CPT

## 2025-02-04 PROCEDURE — 85652 RBC SED RATE AUTOMATED: CPT

## 2025-02-04 PROCEDURE — 70551 MRI BRAIN STEM W/O DYE: CPT | Mod: MC

## 2025-02-04 PROCEDURE — 83036 HEMOGLOBIN GLYCOSYLATED A1C: CPT

## 2025-02-04 PROCEDURE — 93005 ELECTROCARDIOGRAM TRACING: CPT

## 2025-02-04 PROCEDURE — 83735 ASSAY OF MAGNESIUM: CPT

## 2025-02-04 PROCEDURE — 84100 ASSAY OF PHOSPHORUS: CPT

## 2025-02-04 PROCEDURE — 86140 C-REACTIVE PROTEIN: CPT

## 2025-02-04 PROCEDURE — 80061 LIPID PANEL: CPT

## 2025-02-04 PROCEDURE — 99232 SBSQ HOSP IP/OBS MODERATE 35: CPT

## 2025-02-04 PROCEDURE — 93306 TTE W/DOPPLER COMPLETE: CPT

## 2025-02-04 PROCEDURE — 85730 THROMBOPLASTIN TIME PARTIAL: CPT

## 2025-02-04 PROCEDURE — 80048 BASIC METABOLIC PNL TOTAL CA: CPT

## 2025-02-04 PROCEDURE — 70496 CT ANGIOGRAPHY HEAD: CPT | Mod: MC

## 2025-02-04 PROCEDURE — 0042T: CPT

## 2025-02-04 PROCEDURE — 81003 URINALYSIS AUTO W/O SCOPE: CPT

## 2025-02-04 PROCEDURE — 85025 COMPLETE CBC W/AUTO DIFF WBC: CPT

## 2025-02-04 PROCEDURE — 80053 COMPREHEN METABOLIC PANEL: CPT

## 2025-02-04 PROCEDURE — 36415 COLL VENOUS BLD VENIPUNCTURE: CPT

## 2025-02-04 PROCEDURE — 87040 BLOOD CULTURE FOR BACTERIA: CPT

## 2025-02-04 PROCEDURE — 93306 TTE W/DOPPLER COMPLETE: CPT | Mod: 26

## 2025-02-04 PROCEDURE — 97162 PT EVAL MOD COMPLEX 30 MIN: CPT

## 2025-02-04 RX ORDER — ASPIRIN 81 MG/1
1 TABLET, COATED ORAL
Qty: 30 | Refills: 0
Start: 2025-02-04 | End: 2025-03-05

## 2025-02-04 RX ORDER — ASCORBIC ACID 500 MG/ML
500 VIAL (ML) INJECTION DAILY
Refills: 0 | Status: DISCONTINUED | OUTPATIENT
Start: 2025-02-04 | End: 2025-02-04

## 2025-02-04 RX ORDER — ATORVASTATIN CALCIUM 80 MG/1
40 TABLET, FILM COATED ORAL AT BEDTIME
Refills: 0 | Status: DISCONTINUED | OUTPATIENT
Start: 2025-02-04 | End: 2025-02-04

## 2025-02-04 RX ORDER — ASPIRIN 81 MG/1
1 TABLET, COATED ORAL
Qty: 0 | Refills: 0 | DISCHARGE
Start: 2025-02-04

## 2025-02-04 RX ORDER — ATORVASTATIN CALCIUM 80 MG/1
1 TABLET, FILM COATED ORAL
Qty: 0 | Refills: 0 | DISCHARGE
Start: 2025-02-04

## 2025-02-04 RX ORDER — ATORVASTATIN CALCIUM 80 MG/1
1 TABLET, FILM COATED ORAL
Qty: 30 | Refills: 0
Start: 2025-02-04 | End: 2025-03-05

## 2025-02-04 RX ORDER — ZINC SULFATE 220 MG
220 CAPSULE ORAL DAILY
Refills: 0 | Status: DISCONTINUED | OUTPATIENT
Start: 2025-02-04 | End: 2025-02-04

## 2025-02-04 RX ORDER — MECOBAL/LEVOMEFOLAT CA/B6 PHOS 2-3-35 MG
1 TABLET ORAL DAILY
Refills: 0 | Status: DISCONTINUED | OUTPATIENT
Start: 2025-02-04 | End: 2025-02-04

## 2025-02-04 RX ORDER — ZINC SULFATE 220 MG
1 CAPSULE ORAL
Qty: 10 | Refills: 0
Start: 2025-02-04 | End: 2025-02-13

## 2025-02-04 RX ORDER — MECOBAL/LEVOMEFOLAT CA/B6 PHOS 2-3-35 MG
1 TABLET ORAL
Qty: 0 | Refills: 0 | DISCHARGE
Start: 2025-02-04

## 2025-02-04 RX ORDER — BACTERIOSTATIC SODIUM CHLORIDE 0.9 %
250 VIAL (ML) INJECTION ONCE
Refills: 0 | Status: COMPLETED | OUTPATIENT
Start: 2025-02-04 | End: 2025-02-04

## 2025-02-04 RX ADMIN — ASPIRIN 81 MILLIGRAM(S): 81 TABLET, COATED ORAL at 15:19

## 2025-02-04 RX ADMIN — Medication 500 MILLIGRAM(S): at 15:19

## 2025-02-04 RX ADMIN — Medication 220 MILLIGRAM(S): at 15:19

## 2025-02-04 RX ADMIN — Medication 500 MILLILITER(S): at 10:00

## 2025-02-04 RX ADMIN — Medication 1 TABLET(S): at 15:19

## 2025-02-04 NOTE — DISCHARGE NOTE PROVIDER - NSDCCPTREATMENT_GEN_ALL_CORE_FT
PRINCIPAL PROCEDURE  Procedure: MRI brain  Findings and Treatment: TECHNIQUE: MRI of the brain was performed without contrast. Please note   that only axial DWI/ADC and axial T2 FLAIR sequences were performed.  COMPARISON: CT brain 2/2/2025  FINDINGS:  There is no evidence of acute infarct, acute intracranial hemorrhage,   mass effect, or hydrocephalus. No extra-axial collection. Basal cisterns   are patent.  Age-related involutional changes and chronic microvascular ischemic   changes.  Ventricles and sulci are age-appropriate in size.  IMPRESSION:  No acute infarct, acute intracranial hemorrhage, or mass effect.

## 2025-02-04 NOTE — DISCHARGE NOTE PROVIDER - NSDCMRMEDTOKEN_GEN_ALL_CORE_FT
famotidine 20 mg oral tablet: 1 tab(s) orally once a day   aspirin 81 mg oral delayed release tablet: 1 tab(s) orally once a day  atorvastatin 80 mg oral tablet: 1 tab(s) orally once a day (at bedtime)  famotidine 20 mg oral tablet: 1 tab(s) orally once a day  meclizine 12.5 mg oral tablet: 1 tab(s) orally every 6 hours As needed Dizziness   aspirin 81 mg oral delayed release tablet: 1 tab(s) orally once a day  atorvastatin 40 mg oral tablet: 1 tab(s) orally once a day (at bedtime)  famotidine 20 mg oral tablet: 1 tab(s) orally once a day  Lipitor 40 mg oral tablet: 1 tab(s) orally once a day  meclizine 12.5 mg oral tablet: 1 tab(s) orally every 6 hours as needed for Dizziness  meclizine 12.5 mg oral tablet: 1 tab(s) orally every 6 hours As needed Dizziness   aspirin 81 mg oral delayed release tablet: 1 tab(s) orally once a day  atorvastatin 40 mg oral tablet: 1 tab(s) orally once a day (at bedtime)  famotidine 20 mg oral tablet: 1 tab(s) orally once a day  Lipitor 40 mg oral tablet: 1 tab(s) orally once a day  meclizine 12.5 mg oral tablet: 1 tab(s) orally every 6 hours as needed for Dizziness  meclizine 12.5 mg oral tablet: 1 tab(s) orally every 6 hours As needed Dizziness  Multiple Vitamins oral tablet: 1 tab(s) orally once a day  zinc sulfate 220 mg (as elemental zinc 50 mg) oral capsule: 1 cap(s) orally once a day

## 2025-02-04 NOTE — DIETITIAN INITIAL EVALUATION ADULT - PERTINENT MEDS FT
MEDICATIONS  (STANDING):  aspirin enteric coated 81 milliGRAM(s) Oral daily  atorvastatin 80 milliGRAM(s) Oral at bedtime  enoxaparin Injectable 40 milliGRAM(s) SubCutaneous every 24 hours  melatonin 5 milliGRAM(s) Oral at bedtime  pantoprazole    Tablet 40 milliGRAM(s) Oral before breakfast    MEDICATIONS  (PRN):  meclizine 12.5 milliGRAM(s) Oral every 6 hours PRN Dizziness

## 2025-02-04 NOTE — DIETITIAN INITIAL EVALUATION ADULT - EDUCATION DIETARY MODIFICATIONS
Educated/Discussed on ways to promote intake. Educated/Discussed the importance to prioritize protein at meal times. Reviewed good sources of protein on the menu. Pt amenable to oral nutrition supplement at this time. Pt receptive and verbalizes understanding./(2) meets goals/outcomes/verbalization

## 2025-02-04 NOTE — DISCHARGE NOTE NURSING/CASE MANAGEMENT/SOCIAL WORK - FINANCIAL ASSISTANCE
[facility] provides services at a reduced cost to those who are determined to be eligible through [facility]’s financial assistance program. Information regarding [facility]’s financial assistance program can be found by going to [email or web address] or by calling [phone].

## 2025-02-04 NOTE — DISCHARGE NOTE PROVIDER - HOSPITAL COURSE
Hospital course:  84y Male with PMH     During this hospital course, patient had a (ischemic/hemorrhagic) stroke located in (left/right.....) as seen on (MRI/CT).   The stroke etiology is likely secondary to:  []atrial fibrillation  []small vessel disease from atherosclerotic risk factors  []other:  []etiology workup still in progress    Patient had the following workup done in house:  CT Head:   MR Head Non Contrast:  CT Angio Head:  CT Angio Neck:  []echo  []labs  []other    Physical exam at discharge:    New medications on discharge:  Labs to be followed up:  Imaging to be done as outpatient:  Further outpatient workup:   Hospital course:  84y Male with PMH     During this hospital course, patient had a (ischemic/hemorrhagic) stroke located in (left/right.....) as seen on (MRI/CT).   The stroke etiology is likely secondary to:  []atrial fibrillation  []small vessel disease from atherosclerotic risk factors  []other:  []etiology workup still in progress    Patient had the following workup done in house:  CT Head:   MR Head Non Contrast:  CT Angio Head:  CT Angio Neck:  []echo  []labs  []other    Physical exam at discharge:    New medications on discharge:  Labs to be followed up:  Imaging to be done as outpatient:  Further outpatient workup:  Neurology outpatient   Hospital course:  84y Male with PMH     During this hospital course, patient had a peripheral vertigo with stroke rule out on CT and MRI.     Patient had the following workup done in house:  CT Head: Small age indeterminant left cerebellar ischemic event. No large arterial   distribution acute infarct. No acute intracranial hemorrhage.  MR Head Non Contrast: No acute infarct, acute intracranial hemorrhage, or mass effect.   CT Angio Head:  CT Angio Neck:  []echo  []labs  []other    CTA NECK:  No evidence of significant stenosis or occlusion.    CTA HEAD:  1. No large vessel occlusion.  2. Deposition of calcified plaque in association with the cavernous and   supraclinoid bilateral internal carotid arteries, with mild stenosis   bilaterally in this location.  3. No aneurysm identified. Tiny aneurysms can be beyond the resolution of   CTA technique    Physical exam at discharge:     New medications on discharge:  Labs to be followed up:  Imaging to be done as outpatient:  Further outpatient workup:  Neurology outpatient   Hospital course:  The patient is an 84 year old male with a PMH of HLD and prostate cancer admitted with intermittent vertigo with head turning with head CT showing age indeterminate L cerebellar infarct. CTA w/o significant stenosis. MRI brain neg. TTE ***. Will need aspirin/30 day cardiac monitor. D/c with rolling walker and vestibular therapy    During this hospital course, patient had peripheral vertigo.    Patient had the following workup done in house:  CT Head: Small age indeterminant left cerebellar ischemic event. No large arterial distribution acute infarct. No acute intracranial hemorrhage.  MR Head Non Contrast: No acute infarct, acute intracranial hemorrhage, or mass effect.   CTA NECK:  No evidence of significant stenosis or occlusion.  CTA HEAD:  1. No large vessel occlusion.  2. Deposition of calcified plaque in association with the cavernous and supraclinoid bilateral internal carotid arteries, with mild stenosis   bilaterally in this location.  3. No aneurysm identified.   TTE ***  [x]labs  a1c: 5.3  LDL: 89  TSH: 1.860  UA: neg for UTI    Physical exam at discharge:   Neurologic:  -Mental status: Awake, alert, oriented to person, place, and time. Speech is fluent with intact naming, repetition, and comprehension, no dysarthria. Follows 2-step and cross commands.   -Cranial nerves:   II: Visual fields are full to confrontation in R eye. L eye blind.  III, IV, VI: Extraocular movements are intact without nystagmus however needs repeated prompting.  L pupil very sluggish. R pupil reactive.  V:  Facial sensation V1-V3 equal and intact   VII: Face is symmetric with normal eye closure and smile  VIII: Hearing is bilaterally intact to voice  Motor: Normal bulk and tone. No pronator drift. Strength bilateral upper extremity 5/5, bilateral lower extremities 5/5.  Sensation: Intact to light touch bilaterally. No neglect or extinction on double simultaneous testing.  Coordination: LUE/LLE dysmetria  Gait: deferred    New medications on discharge: aspirin 81mg, meclizine 12.5mg q8h PRN, atorvastatin 40mg  Further outpatient workup: 30 day cardiac monitor (EP to mail patient), general neurology outpatient with Dr. Neely, vestibular therapy   Hospital course:  The patient is an 84 year old male with a PMH of HLD and prostate cancer admitted with intermittent vertigo with head turning with head CT showing age indeterminate L cerebellar infarct. CTA w/o significant stenosis. MRI brain neg. TTE unremarkable. Will need aspirin/30 day cardiac monitor. D/c with rolling walker and vestibular therapy.    During this hospital course, patient had peripheral vertigo.    Patient had the following workup done in house:  CT Head: Small age indeterminant left cerebellar ischemic event. No large arterial distribution acute infarct. No acute intracranial hemorrhage.  MR Head Non Contrast: No acute infarct, acute intracranial hemorrhage, or mass effect.   CTA NECK:  No evidence of significant stenosis or occlusion.  CTA HEAD:  1. No large vessel occlusion.  2. Deposition of calcified plaque in association with the cavernous and supraclinoid bilateral internal carotid arteries, with mild stenosis   bilaterally in this location.  3. No aneurysm identified.   TTE: EF 65%  [x]labs  a1c: 5.3  LDL: 89  TSH: 1.860  UA: neg for UTI    Physical exam at discharge:   Neurologic:  -Mental status: Awake, alert, oriented to person, place, and time. Speech is fluent with intact naming, repetition, and comprehension, no dysarthria. Follows 2-step and cross commands.   -Cranial nerves:   II: Visual fields are full to confrontation in R eye. L eye blind.  III, IV, VI: Extraocular movements are intact without nystagmus however needs repeated prompting.  L pupil very sluggish. R pupil reactive.  V:  Facial sensation V1-V3 equal and intact   VII: Face is symmetric with normal eye closure and smile  VIII: Hearing is bilaterally intact to voice  Motor: Normal bulk and tone. No pronator drift. Strength bilateral upper extremity 5/5, bilateral lower extremities 5/5.  Sensation: Intact to light touch bilaterally. No neglect or extinction on double simultaneous testing.  Coordination: LUE/LLE dysmetria  Gait: deferred    New medications on discharge: aspirin 81mg, meclizine 12.5mg q8h PRN, atorvastatin 40mg  Further outpatient workup: 30 day cardiac monitor (EP to mail patient), general neurology outpatient with Dr. Neely, vestibular therapy

## 2025-02-04 NOTE — DIETITIAN INITIAL EVALUATION ADULT - PERTINENT LABORATORY DATA
02-04    140  |  105  |  15  ----------------------------<  93  4.1   |  25  |  1.30    Ca    9.4      04 Feb 2025 05:30  Phos  2.8     02-04  Mg     2.3     02-04    TPro  6.1  /  Alb  3.9  /  TBili  0.7  /  DBili  x   /  AST  14  /  ALT  9[L]  /  AlkPhos  77  02-04  A1C with Estimated Average Glucose Result: 5.3 % (02-03-25 @ 06:02)

## 2025-02-04 NOTE — PROGRESS NOTE ADULT - ASSESSMENT
84M presenting to stroke service with dizziness.    #Dizziness.   - PT/OT  - continue on aspirin and atorvastatin 40mg   - pending TTE  - MRI brain without acute infarct  - cont. work-up and mgmt per Neuro      #HLD (hyperlipidemia).   - continue atorvastatin 40mg    #Orthostatic hypotension  - 250cc fluid bolus, follow up BP afterwards    lovenox for DVT ppx  discussed with stroke team.     35 minutes spent on this encounter, including face to face with patient, review of chart, care coordination and documentation.

## 2025-02-04 NOTE — DIETITIAN INITIAL EVALUATION ADULT - OTHER CALCULATIONS
Actual body weight (63.5kg) used to calculate energy needs due to pt's current body weight within % (94%) ideal body weight. Needs adjusted for advanced age, clinical course, pressure injuries, malnutrition  gradual onset/constant

## 2025-02-04 NOTE — DIETITIAN INITIAL EVALUATION ADULT - OTHER INFO
"82 yo M, former smoker, w/ history of HLD, low potasium syndrome, and prostate cancer presenting with c/o dizziness and unsteadiness. LKW 02/02/25 at 11:00AM. Patient states dizziness suddenly started when he tried to roll over to the left side of the bed. On arrival, /98. Dizziness worsened when moving from lying in CT scan to standing. NIHSS 2 for b/l UE dysmetria. Shuffling gait noted. CTH with age-indeterminate L cerebellum infarct. CTP with possible 7 mL involving the left frontal brain parenchyma - however deemed as a false positive read, and CTA is unremarkable. Patient out of the window for TNK, no indication for thrombectomy since no LVO. Admitted to stroke tele for further workup."     Visited pt at bedside on 5LA for initial assessment. States that at baseline pt consumes 3 smaller meals a day. States that he is a vegetarian at home, does not consume fish, meats. Consumes dairy and eggs. States that he also follows Kosher. Confirms No known food allergies. States that he takes a multivitamin and vitamin D. Consumes ensure 3+ a day, and will occasionally use it as a meal replacement. Noted with missing teeth, states that someone will be brining his dentures in today, however states that he prefers softer foods to consume. Current diet: DASH/TLC. Pt states that he consumed 50% of breakfast this morning. Dosing weight 2/2 140 pounds, UBW: states that he was 170 pounds and intentionally wanted to lose weight, unable to give a time frame. IBW: 148 pounds, %IBW: 94%; Observed with mild depletions temples, buccal, shoulder, clavicle. Based on ASPEN guidelines, pt meets criteria for malnutrition. 0/10 pain per flow sheets. Stage II Pressure injury: sacrum. Ramu Score: 20. No Edema per flow sheets. No issues with nausea, vomiting, diarrhea, constipation reported at time of visit. Last BM noted on 2/1. Meds reviewed. Protonix. Nutritionally Pertinent Labs 2/4 WNL. See Nutrition recommendations below.

## 2025-02-04 NOTE — DISCHARGE NOTE PROVIDER - NSDCHHATTENDCERT_GEN_ALL_CORE
No My signature below certifies that the above stated patient is homebound and upon completion of the Face-To-Face encounter, has the need for intermittent skilled nursing, physical therapy and/or speech or occupational therapy services in their home for their current diagnosis as outlined in their initial plan of care. These services will continue to be monitored by myself or another physician.

## 2025-02-04 NOTE — PROGRESS NOTE ADULT - SUBJECTIVE AND OBJECTIVE BOX
Feeling okay this morning.  Was orthostatic, and now getting a fluid bolus.       OVERNIGHT EVENTS: NAEO      Remaining ROS negative       PHYSICAL EXAM:    General: nad, lying in bed  HEENT: NC/AT; MMM  Cardiovascular: +S1/S2, RRR  Respiratory: CTA B/L; no W/R/R  Gastrointestinal: soft, NT/ND; +BSx4  Extremities: WWP; no edema  Neurological: speech fluent, no facial asymmetry, no acute deficits noted  Psychiatric: pleasant mood and affect      VITAL SIGNS:  Vital Signs Last 24 Hrs  T(C): 37.1 (04 Feb 2025 13:39), Max: 37.1 (04 Feb 2025 13:39)  T(F): 98.7 (04 Feb 2025 13:39), Max: 98.7 (04 Feb 2025 13:39)  HR: 65 (04 Feb 2025 12:33) (57 - 70)  BP: 160/67 (04 Feb 2025 12:33) (135/65 - 174/81)  BP(mean): 96 (04 Feb 2025 12:33) (93 - 117)  RR: 18 (04 Feb 2025 12:33) (15 - 199)  SpO2: 98% (04 Feb 2025 12:33) (98% - 100%)    Parameters below as of 04 Feb 2025 12:33  Patient On (Oxygen Delivery Method): room air    MEDICATIONS:  MEDICATIONS  (STANDING):  ascorbic acid 500 milliGRAM(s) Oral daily  aspirin enteric coated 81 milliGRAM(s) Oral daily  atorvastatin 40 milliGRAM(s) Oral at bedtime  enoxaparin Injectable 40 milliGRAM(s) SubCutaneous every 24 hours  melatonin 5 milliGRAM(s) Oral at bedtime  multivitamin 1 Tablet(s) Oral daily  pantoprazole    Tablet 40 milliGRAM(s) Oral before breakfast  zinc sulfate 220 milliGRAM(s) Oral daily    MEDICATIONS  (PRN):  meclizine 12.5 milliGRAM(s) Oral every 6 hours PRN Dizziness      ALLERGIES:  Allergies    No Known Allergies    Intolerances        LABS:                        12.9   5.52  )-----------( 142      ( 04 Feb 2025 05:30 )             39.8     02-04    140  |  105  |  15  ----------------------------<  93  4.1   |  25  |  1.30    Ca    9.4      04 Feb 2025 05:30  Phos  2.8     02-04  Mg     2.3     02-04    TPro  6.1  /  Alb  3.9  /  TBili  0.7  /  DBili  x   /  AST  14  /  ALT  9[L]  /  AlkPhos  77  02-04    PT/INR - ( 02 Feb 2025 16:08 )   PT: 10.0 sec;   INR: 0.85          PTT - ( 02 Feb 2025 16:08 )  PTT:28.8 sec  Urinalysis Basic - ( 04 Feb 2025 05:30 )    Color: x / Appearance: x / SG: x / pH: x  Gluc: 93 mg/dL / Ketone: x  / Bili: x / Urobili: x   Blood: x / Protein: x / Nitrite: x   Leuk Esterase: x / RBC: x / WBC x   Sq Epi: x / Non Sq Epi: x / Bacteria: x      CAPILLARY BLOOD GLUCOSE          RADIOLOGY & ADDITIONAL TESTS: Reviewed.

## 2025-02-04 NOTE — CHART NOTE - NSCHARTNOTEFT_GEN_A_CORE
Patient will require a rolling walker at home due to their diagnosis of peripheral vertigo which is causing unsteadiness to help complete the MRADLs.

## 2025-02-04 NOTE — PROGRESS NOTE ADULT - TIME BILLING
preparing to see Pt.; interviewing Pt.; examining Pt.; reviewing labs and images; documentation in [place]; d/c planning on IDRs; d/w Neuro ACP on rounds
Review of objective data, notes in chart; interview/exam of patient; discussion of assessment/plan with patient and multidisciplinary team.

## 2025-02-04 NOTE — DISCHARGE NOTE PROVIDER - NSDCCPCAREPLAN_GEN_ALL_CORE_FT
PRINCIPAL DISCHARGE DIAGNOSIS  Diagnosis: Peripheral vertigo  Assessment and Plan of Treatment: When you're dizzy, you may feel lightheaded, woozy, or disoriented. If you feel like you or the room are spinning, you have vertigo. These feelings may make you lose your balance.  Dizziness can have many different causes. A sudden drop in blood pressure or being dehydrated can make you dizzy. Many people feel lightheaded if they get up too quickly from sitting or lying down. Certain medicines and problems with your inner ear may cause dizziness. So can motion sickness. Sometimes dizziness can be a symptom of other disorders.

## 2025-02-04 NOTE — DISCHARGE NOTE PROVIDER - NSDCFUADDAPPT_GEN_ALL_CORE_FT
Please follow up with your general neurologist Dr. Neely    You will get a cardiac monitor mailed to your home. Please wear for 30 days and follow the provided instructions.

## 2025-02-04 NOTE — DIETITIAN INITIAL EVALUATION ADULT - ADD RECOMMEND
1. Recommend liberalizing diet: Regular to provide more options on the menu.   2. Recommend Ensure High Protein 2x/day (350 kcal, 20 g protein in each) to optimize intake.  3. Recommend micronutrient supplementation: multivitamin, vitamin C, Zinc x 10 days for wound healing unless otherwise contraindicated   4. Monitor PO intake, diet tolerance, weight trends, labs, and skin integrity and bowel movement regularity.   5. Encourage PO intake and honor food preferences as able unless otherwise contraindicated.   6. RDN will continue to monitor, reassess, and intervene as appropriate.

## 2025-02-04 NOTE — DISCHARGE NOTE NURSING/CASE MANAGEMENT/SOCIAL WORK - PATIENT PORTAL LINK FT
You can access the FollowMyHealth Patient Portal offered by [facility] by registering at the following website: [email or web address]. By joining [email or web address]’s FollowMyHealth portal, you will also be able to view your health information using other applications (apps) compatible with our system.

## 2025-02-04 NOTE — DIETITIAN INITIAL EVALUATION ADULT - REASON INDICATOR FOR ASSESSMENT
Nutrition consult warranted for: Pressure injury stage II or >   Information obtained from: electronic medical record and patient  Chart reviewed, events noted.

## 2025-02-11 DIAGNOSIS — I10 ESSENTIAL (PRIMARY) HYPERTENSION: ICD-10-CM

## 2025-02-11 DIAGNOSIS — Z87.891 PERSONAL HISTORY OF NICOTINE DEPENDENCE: ICD-10-CM

## 2025-02-11 DIAGNOSIS — I95.1 ORTHOSTATIC HYPOTENSION: ICD-10-CM

## 2025-02-11 DIAGNOSIS — E87.6 HYPOKALEMIA: ICD-10-CM

## 2025-02-11 DIAGNOSIS — H81.392 OTHER PERIPHERAL VERTIGO, LEFT EAR: ICD-10-CM

## 2025-02-11 DIAGNOSIS — E78.5 HYPERLIPIDEMIA, UNSPECIFIED: ICD-10-CM

## 2025-02-11 DIAGNOSIS — Z85.46 PERSONAL HISTORY OF MALIGNANT NEOPLASM OF PROSTATE: ICD-10-CM

## 2025-02-11 DIAGNOSIS — R42 DIZZINESS AND GIDDINESS: ICD-10-CM

## 2025-02-12 ENCOUNTER — RX RENEWAL (OUTPATIENT)
Age: 85
End: 2025-02-12

## 2025-06-16 ENCOUNTER — RX RENEWAL (OUTPATIENT)
Age: 85
End: 2025-06-16

## 2025-07-06 ENCOUNTER — EMERGENCY (EMERGENCY)
Facility: HOSPITAL | Age: 85
LOS: 1 days | End: 2025-07-06
Attending: STUDENT IN AN ORGANIZED HEALTH CARE EDUCATION/TRAINING PROGRAM | Admitting: STUDENT IN AN ORGANIZED HEALTH CARE EDUCATION/TRAINING PROGRAM
Payer: MEDICARE

## 2025-07-06 VITALS
HEART RATE: 58 BPM | SYSTOLIC BLOOD PRESSURE: 142 MMHG | OXYGEN SATURATION: 97 % | RESPIRATION RATE: 16 BRPM | DIASTOLIC BLOOD PRESSURE: 75 MMHG

## 2025-07-06 VITALS
RESPIRATION RATE: 17 BRPM | OXYGEN SATURATION: 100 % | HEART RATE: 63 BPM | TEMPERATURE: 99 F | SYSTOLIC BLOOD PRESSURE: 150 MMHG | DIASTOLIC BLOOD PRESSURE: 89 MMHG

## 2025-07-06 DIAGNOSIS — Z98.890 OTHER SPECIFIED POSTPROCEDURAL STATES: Chronic | ICD-10-CM

## 2025-07-06 LAB
ALBUMIN SERPL ELPH-MCNC: 3.9 G/DL — SIGNIFICANT CHANGE UP (ref 3.3–5)
ALP SERPL-CCNC: 79 U/L — SIGNIFICANT CHANGE UP (ref 40–120)
ALT FLD-CCNC: 8 U/L — LOW (ref 10–45)
ANION GAP SERPL CALC-SCNC: 10 MMOL/L — SIGNIFICANT CHANGE UP (ref 5–17)
APPEARANCE UR: ABNORMAL
AST SERPL-CCNC: 14 U/L — SIGNIFICANT CHANGE UP (ref 10–40)
BASOPHILS # BLD AUTO: 0.04 K/UL — SIGNIFICANT CHANGE UP (ref 0–0.2)
BASOPHILS NFR BLD AUTO: 0.5 % — SIGNIFICANT CHANGE UP (ref 0–2)
BILIRUB SERPL-MCNC: 0.5 MG/DL — SIGNIFICANT CHANGE UP (ref 0.2–1.2)
BILIRUB UR-MCNC: NEGATIVE — SIGNIFICANT CHANGE UP
BUN SERPL-MCNC: 17 MG/DL — SIGNIFICANT CHANGE UP (ref 7–23)
CALCIUM SERPL-MCNC: 10 MG/DL — SIGNIFICANT CHANGE UP (ref 8.4–10.5)
CHLORIDE SERPL-SCNC: 102 MMOL/L — SIGNIFICANT CHANGE UP (ref 96–108)
CO2 SERPL-SCNC: 27 MMOL/L — SIGNIFICANT CHANGE UP (ref 22–31)
COLOR SPEC: YELLOW — SIGNIFICANT CHANGE UP
CREAT SERPL-MCNC: 1.48 MG/DL — HIGH (ref 0.5–1.3)
DIFF PNL FLD: NEGATIVE — SIGNIFICANT CHANGE UP
EGFR: 46 ML/MIN/1.73M2 — LOW
EGFR: 46 ML/MIN/1.73M2 — LOW
EOSINOPHIL # BLD AUTO: 0.07 K/UL — SIGNIFICANT CHANGE UP (ref 0–0.5)
EOSINOPHIL NFR BLD AUTO: 0.8 % — SIGNIFICANT CHANGE UP (ref 0–6)
GLUCOSE SERPL-MCNC: 107 MG/DL — HIGH (ref 70–99)
GLUCOSE UR QL: NEGATIVE MG/DL — SIGNIFICANT CHANGE UP
HCT VFR BLD CALC: 38.4 % — LOW (ref 39–50)
HGB BLD-MCNC: 12.3 G/DL — LOW (ref 13–17)
IMM GRANULOCYTES # BLD AUTO: 0.03 K/UL — SIGNIFICANT CHANGE UP (ref 0–0.07)
IMM GRANULOCYTES NFR BLD AUTO: 0.3 % — SIGNIFICANT CHANGE UP (ref 0–0.9)
KETONES UR QL: NEGATIVE MG/DL — SIGNIFICANT CHANGE UP
LEUKOCYTE ESTERASE UR-ACNC: NEGATIVE — SIGNIFICANT CHANGE UP
LIDOCAIN IGE QN: 26 U/L — SIGNIFICANT CHANGE UP (ref 7–60)
LYMPHOCYTES # BLD AUTO: 1.7 K/UL — SIGNIFICANT CHANGE UP (ref 1–3.3)
LYMPHOCYTES NFR BLD AUTO: 19.8 % — SIGNIFICANT CHANGE UP (ref 13–44)
MAGNESIUM SERPL-MCNC: 2.6 MG/DL — SIGNIFICANT CHANGE UP (ref 1.6–2.6)
MCHC RBC-ENTMCNC: 31.3 PG — SIGNIFICANT CHANGE UP (ref 27–34)
MCHC RBC-ENTMCNC: 32 G/DL — SIGNIFICANT CHANGE UP (ref 32–36)
MCV RBC AUTO: 97.7 FL — SIGNIFICANT CHANGE UP (ref 80–100)
MONOCYTES # BLD AUTO: 0.61 K/UL — SIGNIFICANT CHANGE UP (ref 0–0.9)
MONOCYTES NFR BLD AUTO: 7.1 % — SIGNIFICANT CHANGE UP (ref 2–14)
NEUTROPHILS # BLD AUTO: 6.14 K/UL — SIGNIFICANT CHANGE UP (ref 1.8–7.4)
NEUTROPHILS NFR BLD AUTO: 71.5 % — SIGNIFICANT CHANGE UP (ref 43–77)
NITRITE UR-MCNC: NEGATIVE — SIGNIFICANT CHANGE UP
NRBC # BLD AUTO: 0 K/UL — SIGNIFICANT CHANGE UP (ref 0–0)
NRBC # FLD: 0 K/UL — SIGNIFICANT CHANGE UP (ref 0–0)
NRBC BLD AUTO-RTO: 0 /100 WBCS — SIGNIFICANT CHANGE UP (ref 0–0)
PH UR: 8 — SIGNIFICANT CHANGE UP (ref 5–8)
PLATELET # BLD AUTO: 297 K/UL — SIGNIFICANT CHANGE UP (ref 150–400)
PMV BLD: 10.5 FL — SIGNIFICANT CHANGE UP (ref 7–13)
POTASSIUM SERPL-MCNC: 3.9 MMOL/L — SIGNIFICANT CHANGE UP (ref 3.5–5.3)
POTASSIUM SERPL-SCNC: 3.9 MMOL/L — SIGNIFICANT CHANGE UP (ref 3.5–5.3)
PROT SERPL-MCNC: 6.8 G/DL — SIGNIFICANT CHANGE UP (ref 6–8.3)
PROT UR-MCNC: SIGNIFICANT CHANGE UP MG/DL
RBC # BLD: 3.93 M/UL — LOW (ref 4.2–5.8)
RBC # FLD: 13.4 % — SIGNIFICANT CHANGE UP (ref 10.3–14.5)
SODIUM SERPL-SCNC: 139 MMOL/L — SIGNIFICANT CHANGE UP (ref 135–145)
SP GR SPEC: 1.03 — SIGNIFICANT CHANGE UP (ref 1–1.03)
UROBILINOGEN FLD QL: 1 MG/DL — SIGNIFICANT CHANGE UP (ref 0.2–1)
WBC # BLD: 8.59 K/UL — SIGNIFICANT CHANGE UP (ref 3.8–10.5)
WBC # FLD AUTO: 8.59 K/UL — SIGNIFICANT CHANGE UP (ref 3.8–10.5)

## 2025-07-06 PROCEDURE — 74177 CT ABD & PELVIS W/CONTRAST: CPT

## 2025-07-06 PROCEDURE — 85025 COMPLETE CBC W/AUTO DIFF WBC: CPT

## 2025-07-06 PROCEDURE — 74177 CT ABD & PELVIS W/CONTRAST: CPT | Mod: 26

## 2025-07-06 PROCEDURE — 36415 COLL VENOUS BLD VENIPUNCTURE: CPT

## 2025-07-06 PROCEDURE — 93005 ELECTROCARDIOGRAM TRACING: CPT

## 2025-07-06 PROCEDURE — 99284 EMERGENCY DEPT VISIT MOD MDM: CPT | Mod: FS

## 2025-07-06 PROCEDURE — 83690 ASSAY OF LIPASE: CPT

## 2025-07-06 PROCEDURE — 96374 THER/PROPH/DIAG INJ IV PUSH: CPT | Mod: XU

## 2025-07-06 PROCEDURE — 99285 EMERGENCY DEPT VISIT HI MDM: CPT | Mod: 25

## 2025-07-06 PROCEDURE — 93010 ELECTROCARDIOGRAM REPORT: CPT

## 2025-07-06 PROCEDURE — 83735 ASSAY OF MAGNESIUM: CPT

## 2025-07-06 PROCEDURE — 80053 COMPREHEN METABOLIC PANEL: CPT

## 2025-07-06 RX ORDER — ONDANSETRON HCL/PF 4 MG/2 ML
4 VIAL (ML) INJECTION ONCE
Refills: 0 | Status: COMPLETED | OUTPATIENT
Start: 2025-07-06 | End: 2025-07-06

## 2025-07-06 RX ORDER — IOHEXOL 350 MG/ML
30 INJECTION, SOLUTION INTRAVENOUS ONCE
Refills: 0 | Status: COMPLETED | OUTPATIENT
Start: 2025-07-06 | End: 2025-07-06

## 2025-07-06 RX ADMIN — Medication 4 MILLIGRAM(S): at 11:40

## 2025-07-06 RX ADMIN — IOHEXOL 30 MILLILITER(S): 350 INJECTION, SOLUTION INTRAVENOUS at 11:38

## 2025-07-06 NOTE — ED ADULT NURSE NOTE - OBJECTIVE STATEMENT
Pt arrives via ambulance for abdominal pain and episodes of vomiting last night. Hx of dementia, alert to his name only. Unable to verbalize further complaints. No active vomiting noted. No trauma noted. Pt otherwise in no distress.

## 2025-07-06 NOTE — ED PROVIDER NOTE - PROGRESS NOTE DETAILS
Discussed with Philly, pt's son [phone]. He states he was told by pt's wife's caregiver that pt needed medical attention and so he called EMS for pt, did not see him today. Pt has been having N/V intermittently for "weeks to months" and does not eat much. Discussed with Philly, pt's son [phone]. He states he was told by pt's wife's caregiver that pt asked for medical attention and so he called EMS for pt, did not see him today. Pt has been having N/V intermittently for "weeks to months" and does not eat much. Discussed with Philly, pt's son [phone]. He states he was told by pt's wife's caregiver that pt asked for medical attention and so he called EMS for pt, did not see him today. Pt has been having N/V intermittently for "weeks to months" and does not eat much. States he took pt to follow-up with GI but pt did not want to go for endoscopy due to risks. Discussed with Philly, pt's daughter [phone]. They state they were told by pt's wife's caregiver that pt asked for medical attention and so they called EMS for pt, did not see him today. Pt has been having N/V intermittently for "weeks to months" and does not eat much. States they took pt to follow-up with GI but pt did not want to go for endoscopy due to risks. D/w family member Philly. Reviewed labs and CT. Patient pending PO challenge at this time to determine disposition. D/w pt. Advised on need for follow-up with GI. Also d/w family member Philly via phone. Reviewed labs and CT. Patient pending PO challenge at this time to determine disposition.

## 2025-07-06 NOTE — ED ADULT TRIAGE NOTE - CHIEF COMPLAINT QUOTE
BIBEMS from home for abdominal pain, nausea and vomiting since last night. hx of dementia. per EMS had 2 episodes of vomiting in the night. Pt points to right lower abdomen when asked where the pain is. a&ox1.

## 2025-07-06 NOTE — ED PROVIDER NOTE - GASTROINTESTINAL, MLM
Abdomen soft, generalized tenderness, no rebound or guarding. Abdomen soft, generalized tenderness worse in lower abdomen, no rebound or guarding. No bruising.

## 2025-07-06 NOTE — ED PROVIDER NOTE - NSICDXPASTMEDICALHX_GEN_ALL_CORE_FT
PAST MEDICAL HISTORY:  CA of prostate     Hyperlipemia     Low potassium syndrome     Vascular dementia

## 2025-07-06 NOTE — ED PROVIDER NOTE - CLINICAL SUMMARY MEDICAL DECISION MAKING FREE TEXT BOX
85 year old M with hx of vascular dementia (former smoker x 60 years) presents for evaluation of N/V and abdominal pain. Patient unable to provide much history due to dementia. No CP, SOB, fever or chills. Unknown if any abdominal surgeries. Daughter Philly called regarding pt: pt has had intermittent N/V for "weeks to months", saw GI who recommended endoscopy but pt did not want to go for it at the time. Wife's caregiver called as pt was c/o pain and N/V and wanted to be seen. Patient in NAD at time of exam, no signs of trauma. No peritoneal signs. Vitals wnl. D/w attending and labs/CT obtained which revealed SMITH and duodenitis. PO challenge performed. 85 year old M with hx of vascular dementia (former smoker x 60 years) presents for evaluation of N/V and abdominal pain. Patient unable to provide much history due to dementia. No CP, SOB, fever or chills. Unknown if any abdominal surgeries. Daughter hPilly called regarding pt: pt has had intermittent N/V for "weeks to months", saw GI who recommended endoscopy but pt did not want to go for it at the time. Wife's caregiver called as pt was c/o pain and N/V and wanted to be seen. Patient in NAD at time of exam, no signs of trauma. No peritoneal signs. Vitals wnl. D/w attending and labs/CT obtained which revealed SMITH and duodenitis. PO challenge performed and successful. D/w patient's child and pt to be discharged home to f/u outpatient with GI.

## 2025-07-06 NOTE — ED PROVIDER NOTE - PATIENT PORTAL LINK FT
You can access the FollowMyHealth Patient Portal offered by [facility] by registering at the following website: [email or web address]. By joining [facility]’s FollowMyHealth portal, you will also be able to view your health information using other applications (apps) compatible with our system.

## 2025-07-06 NOTE — ED PROVIDER NOTE - NSFOLLOWUPINSTRUCTIONS_ED_ALL_ED_FT
Please follow-up with GI as we discussed, avoid citrus/acidic foods, bland diet, follow-up with PCP and return to the ED if any concerns

## 2025-07-06 NOTE — ED PROVIDER NOTE - OBJECTIVE STATEMENT
85 year old M with hx of vascular dementia (former smoker x 60 years) presents for evaluation of N/V and abdominal pain. Patient unable to provide history due to dementia. No CP, SOB, fever or chills. 85 year old M with hx of vascular dementia (former smoker x 60 years) presents for evaluation of N/V and abdominal pain. Patient unable to provide much history due to dementia. No CP, SOB, fever or chills.    Son Philly called regarding pt: pt has had intermittent N/V for "weeks to months", saw GI who recommended endoscopy but pt did not want to go for it at the time. Wife's caregiver called pt's son as pt was c/o pain and N/V and wanted to be seen. 85 year old M with hx of vascular dementia (former smoker x 60 years) presents for evaluation of N/V and abdominal pain. Patient unable to provide much history due to dementia. No CP, SOB, fever or chills. Unknown if any abdominal surgeries.    Son Philly called regarding pt: pt has had intermittent N/V for "weeks to months", saw GI who recommended endoscopy but pt did not want to go for it at the time. Wife's caregiver called pt's son as pt was c/o pain and N/V and wanted to be seen. 85 year old M with hx of vascular dementia (former smoker x 60 years) presents for evaluation of N/V and abdominal pain. Patient unable to provide much history due to dementia. No CP, SOB, fever or chills. Unknown if any abdominal surgeries.    Daughter Philly called regarding pt: pt has had intermittent N/V for "weeks to months", saw GI who recommended endoscopy but pt did not want to go for it at the time. Wife's caregiver called as pt was c/o pain and N/V and wanted to be seen.

## 2025-07-06 NOTE — ED PROVIDER NOTE - MUSCULOSKELETAL, MLM
----- Message from Ken Caldwell MD sent at 12/13/2024  5:16 PM EST -----  Kcl and Mag supplements  ----- Message -----  From: Lab, Background User  Sent: 12/13/2024   1:00 PM EST  To: Ken Caldwell MD  
Spoke with the patient about the need to take KCL and MG supplements.  He verbalized understanding and will  the supplements at the pharmacy.  
Spine appears normal, range of motion is not limited, no muscle or joint tenderness

## 2025-07-06 NOTE — ED PROVIDER NOTE - DIFFERENTIAL DIAGNOSIS
Differential Diagnosis SBO gastritis constipation GERD colitis diverticulitis appendicitis kidney stone

## 2025-07-08 DIAGNOSIS — Z87.891 PERSONAL HISTORY OF NICOTINE DEPENDENCE: ICD-10-CM

## 2025-07-08 DIAGNOSIS — R11.2 NAUSEA WITH VOMITING, UNSPECIFIED: ICD-10-CM

## 2025-07-08 DIAGNOSIS — K29.80 DUODENITIS WITHOUT BLEEDING: ICD-10-CM

## 2025-07-08 DIAGNOSIS — F01.50 VASCULAR DEMENTIA, UNSPECIFIED SEVERITY, WITHOUT BEHAVIORAL DISTURBANCE, PSYCHOTIC DISTURBANCE, MOOD DISTURBANCE, AND ANXIETY: ICD-10-CM

## 2025-07-08 DIAGNOSIS — N17.9 ACUTE KIDNEY FAILURE, UNSPECIFIED: ICD-10-CM

## 2025-07-27 ENCOUNTER — INPATIENT (INPATIENT)
Facility: HOSPITAL | Age: 85
LOS: 3 days | Discharge: HOME HEALTH | End: 2025-07-31
Attending: PSYCHIATRY & NEUROLOGY | Admitting: STUDENT IN AN ORGANIZED HEALTH CARE EDUCATION/TRAINING PROGRAM
Payer: MEDICARE

## 2025-07-27 VITALS
RESPIRATION RATE: 18 BRPM | TEMPERATURE: 98 F | HEART RATE: 60 BPM | DIASTOLIC BLOOD PRESSURE: 84 MMHG | WEIGHT: 154.98 LBS | OXYGEN SATURATION: 99 % | SYSTOLIC BLOOD PRESSURE: 151 MMHG

## 2025-07-27 DIAGNOSIS — Z98.890 OTHER SPECIFIED POSTPROCEDURAL STATES: Chronic | ICD-10-CM

## 2025-07-27 LAB
ALBUMIN SERPL ELPH-MCNC: 3.8 G/DL — SIGNIFICANT CHANGE UP (ref 3.3–5)
ALP SERPL-CCNC: 75 U/L — SIGNIFICANT CHANGE UP (ref 40–120)
ALT FLD-CCNC: 9 U/L — LOW (ref 10–45)
ANION GAP SERPL CALC-SCNC: 11 MMOL/L — SIGNIFICANT CHANGE UP (ref 5–17)
APPEARANCE UR: ABNORMAL
AST SERPL-CCNC: 16 U/L — SIGNIFICANT CHANGE UP (ref 10–40)
BASOPHILS # BLD AUTO: 0.04 K/UL — SIGNIFICANT CHANGE UP (ref 0–0.2)
BASOPHILS NFR BLD AUTO: 0.6 % — SIGNIFICANT CHANGE UP (ref 0–2)
BILIRUB SERPL-MCNC: 0.4 MG/DL — SIGNIFICANT CHANGE UP (ref 0.2–1.2)
BILIRUB UR-MCNC: NEGATIVE — SIGNIFICANT CHANGE UP
BUN SERPL-MCNC: 16 MG/DL — SIGNIFICANT CHANGE UP (ref 7–23)
CALCIUM SERPL-MCNC: 10.1 MG/DL — SIGNIFICANT CHANGE UP (ref 8.4–10.5)
CHLORIDE SERPL-SCNC: 103 MMOL/L — SIGNIFICANT CHANGE UP (ref 96–108)
CO2 SERPL-SCNC: 25 MMOL/L — SIGNIFICANT CHANGE UP (ref 22–31)
COLOR SPEC: YELLOW — SIGNIFICANT CHANGE UP
CREAT SERPL-MCNC: 1.55 MG/DL — HIGH (ref 0.5–1.3)
DIFF PNL FLD: NEGATIVE — SIGNIFICANT CHANGE UP
EGFR: 44 ML/MIN/1.73M2 — LOW
EGFR: 44 ML/MIN/1.73M2 — LOW
EOSINOPHIL # BLD AUTO: 0.05 K/UL — SIGNIFICANT CHANGE UP (ref 0–0.5)
EOSINOPHIL NFR BLD AUTO: 0.7 % — SIGNIFICANT CHANGE UP (ref 0–6)
GAS PNL BLDV: SIGNIFICANT CHANGE UP
GLUCOSE SERPL-MCNC: 121 MG/DL — HIGH (ref 70–99)
GLUCOSE UR QL: NEGATIVE MG/DL — SIGNIFICANT CHANGE UP
HCT VFR BLD CALC: 36.6 % — LOW (ref 39–50)
HGB BLD-MCNC: 11.9 G/DL — LOW (ref 13–17)
IMM GRANULOCYTES # BLD AUTO: 0.01 K/UL — SIGNIFICANT CHANGE UP (ref 0–0.07)
IMM GRANULOCYTES NFR BLD AUTO: 0.1 % — SIGNIFICANT CHANGE UP (ref 0–0.9)
KETONES UR QL: ABNORMAL MG/DL
LACTATE SERPL-SCNC: 2 MMOL/L — SIGNIFICANT CHANGE UP (ref 0.5–2)
LEUKOCYTE ESTERASE UR-ACNC: ABNORMAL
LIDOCAIN IGE QN: 23 U/L — SIGNIFICANT CHANGE UP (ref 7–60)
LYMPHOCYTES # BLD AUTO: 1.75 K/UL — SIGNIFICANT CHANGE UP (ref 1–3.3)
LYMPHOCYTES NFR BLD AUTO: 24.8 % — SIGNIFICANT CHANGE UP (ref 13–44)
MAGNESIUM SERPL-MCNC: 2.6 MG/DL — SIGNIFICANT CHANGE UP (ref 1.6–2.6)
MCHC RBC-ENTMCNC: 32 PG — SIGNIFICANT CHANGE UP (ref 27–34)
MCHC RBC-ENTMCNC: 32.5 G/DL — SIGNIFICANT CHANGE UP (ref 32–36)
MCV RBC AUTO: 98.4 FL — SIGNIFICANT CHANGE UP (ref 80–100)
MONOCYTES # BLD AUTO: 0.54 K/UL — SIGNIFICANT CHANGE UP (ref 0–0.9)
MONOCYTES NFR BLD AUTO: 7.7 % — SIGNIFICANT CHANGE UP (ref 2–14)
NEUTROPHILS # BLD AUTO: 4.66 K/UL — SIGNIFICANT CHANGE UP (ref 1.8–7.4)
NEUTROPHILS NFR BLD AUTO: 66.1 % — SIGNIFICANT CHANGE UP (ref 43–77)
NITRITE UR-MCNC: NEGATIVE — SIGNIFICANT CHANGE UP
NRBC # BLD AUTO: 0 K/UL — SIGNIFICANT CHANGE UP (ref 0–0)
NRBC # FLD: 0 K/UL — SIGNIFICANT CHANGE UP (ref 0–0)
NRBC BLD AUTO-RTO: 0 /100 WBCS — SIGNIFICANT CHANGE UP (ref 0–0)
PH UR: 8.5 (ref 5–8)
PLATELET # BLD AUTO: 211 K/UL — SIGNIFICANT CHANGE UP (ref 150–400)
PMV BLD: 10.5 FL — SIGNIFICANT CHANGE UP (ref 7–13)
POTASSIUM SERPL-MCNC: 4.2 MMOL/L — SIGNIFICANT CHANGE UP (ref 3.5–5.3)
POTASSIUM SERPL-SCNC: 4.2 MMOL/L — SIGNIFICANT CHANGE UP (ref 3.5–5.3)
PROT SERPL-MCNC: 6.4 G/DL — SIGNIFICANT CHANGE UP (ref 6–8.3)
PROT UR-MCNC: SIGNIFICANT CHANGE UP MG/DL
RBC # BLD: 3.72 M/UL — LOW (ref 4.2–5.8)
RBC # FLD: 13.8 % — SIGNIFICANT CHANGE UP (ref 10.3–14.5)
SODIUM SERPL-SCNC: 139 MMOL/L — SIGNIFICANT CHANGE UP (ref 135–145)
SP GR SPEC: 1.02 — SIGNIFICANT CHANGE UP (ref 1–1.03)
TROPONIN T, HIGH SENSITIVITY RESULT: 26 NG/L — SIGNIFICANT CHANGE UP (ref 0–51)
UROBILINOGEN FLD QL: 1 MG/DL — SIGNIFICANT CHANGE UP (ref 0.2–1)
WBC # BLD: 7.05 K/UL — SIGNIFICANT CHANGE UP (ref 3.8–10.5)
WBC # FLD AUTO: 7.05 K/UL — SIGNIFICANT CHANGE UP (ref 3.8–10.5)

## 2025-07-27 PROCEDURE — 84484 ASSAY OF TROPONIN QUANT: CPT

## 2025-07-27 PROCEDURE — 85025 COMPLETE CBC W/AUTO DIFF WBC: CPT

## 2025-07-27 PROCEDURE — 82962 GLUCOSE BLOOD TEST: CPT

## 2025-07-27 PROCEDURE — 99285 EMERGENCY DEPT VISIT HI MDM: CPT

## 2025-07-27 PROCEDURE — 71045 X-RAY EXAM CHEST 1 VIEW: CPT | Mod: 26

## 2025-07-27 PROCEDURE — 83735 ASSAY OF MAGNESIUM: CPT

## 2025-07-27 PROCEDURE — 83690 ASSAY OF LIPASE: CPT

## 2025-07-27 PROCEDURE — 36415 COLL VENOUS BLD VENIPUNCTURE: CPT

## 2025-07-27 PROCEDURE — 82803 BLOOD GASES ANY COMBINATION: CPT

## 2025-07-27 PROCEDURE — 82330 ASSAY OF CALCIUM: CPT

## 2025-07-27 PROCEDURE — 93010 ELECTROCARDIOGRAM REPORT: CPT

## 2025-07-27 PROCEDURE — 84295 ASSAY OF SERUM SODIUM: CPT

## 2025-07-27 PROCEDURE — 81001 URINALYSIS AUTO W/SCOPE: CPT

## 2025-07-27 PROCEDURE — 70450 CT HEAD/BRAIN W/O DYE: CPT | Mod: 26

## 2025-07-27 PROCEDURE — 80053 COMPREHEN METABOLIC PANEL: CPT

## 2025-07-27 PROCEDURE — 84132 ASSAY OF SERUM POTASSIUM: CPT

## 2025-07-27 PROCEDURE — 83605 ASSAY OF LACTIC ACID: CPT

## 2025-07-27 RX ORDER — ATORVASTATIN CALCIUM 80 MG/1
80 TABLET, FILM COATED ORAL AT BEDTIME
Refills: 0 | Status: DISCONTINUED | OUTPATIENT
Start: 2025-07-27 | End: 2025-07-31

## 2025-07-27 RX ORDER — ASPIRIN 325 MG
81 TABLET ORAL DAILY
Refills: 0 | Status: DISCONTINUED | OUTPATIENT
Start: 2025-07-27 | End: 2025-07-31

## 2025-07-27 RX ORDER — CLOPIDOGREL BISULFATE 75 MG/1
75 TABLET, FILM COATED ORAL DAILY
Refills: 0 | Status: DISCONTINUED | OUTPATIENT
Start: 2025-07-27 | End: 2025-07-31

## 2025-07-27 RX ADMIN — Medication 1000 MILLILITER(S): at 17:10

## 2025-07-27 RX ADMIN — ATORVASTATIN CALCIUM 80 MILLIGRAM(S): 80 TABLET, FILM COATED ORAL at 23:42

## 2025-07-27 RX ADMIN — CLOPIDOGREL BISULFATE 75 MILLIGRAM(S): 75 TABLET, FILM COATED ORAL at 23:42

## 2025-07-27 RX ADMIN — Medication 81 MILLIGRAM(S): at 23:42

## 2025-07-28 DIAGNOSIS — F01.50 VASCULAR DEMENTIA, UNSPECIFIED SEVERITY, WITHOUT BEHAVIORAL DISTURBANCE, PSYCHOTIC DISTURBANCE, MOOD DISTURBANCE, AND ANXIETY: ICD-10-CM

## 2025-07-28 DIAGNOSIS — I10 ESSENTIAL (PRIMARY) HYPERTENSION: ICD-10-CM

## 2025-07-28 LAB
A1C WITH ESTIMATED AVERAGE GLUCOSE RESULT: 4.9 % — SIGNIFICANT CHANGE UP (ref 4–5.6)
ANION GAP SERPL CALC-SCNC: 11 MMOL/L — SIGNIFICANT CHANGE UP (ref 5–17)
BUN SERPL-MCNC: 15 MG/DL — SIGNIFICANT CHANGE UP (ref 7–23)
CALCIUM SERPL-MCNC: 9.5 MG/DL — SIGNIFICANT CHANGE UP (ref 8.4–10.5)
CHLORIDE SERPL-SCNC: 104 MMOL/L — SIGNIFICANT CHANGE UP (ref 96–108)
CHOLEST SERPL-MCNC: 162 MG/DL — SIGNIFICANT CHANGE UP
CO2 SERPL-SCNC: 23 MMOL/L — SIGNIFICANT CHANGE UP (ref 22–31)
CREAT SERPL-MCNC: 1.38 MG/DL — HIGH (ref 0.5–1.3)
EGFR: 50 ML/MIN/1.73M2 — LOW
EGFR: 50 ML/MIN/1.73M2 — LOW
ESTIMATED AVERAGE GLUCOSE: 94 MG/DL — SIGNIFICANT CHANGE UP (ref 68–114)
GLUCOSE SERPL-MCNC: 92 MG/DL — SIGNIFICANT CHANGE UP (ref 70–99)
HCT VFR BLD CALC: 35 % — LOW (ref 39–50)
HDLC SERPL-MCNC: 55 MG/DL — SIGNIFICANT CHANGE UP
HGB BLD-MCNC: 11.3 G/DL — LOW (ref 13–17)
LDLC SERPL-MCNC: 93 MG/DL — SIGNIFICANT CHANGE UP
LIPID PNL WITH DIRECT LDL SERPL: 93 MG/DL — SIGNIFICANT CHANGE UP
MAGNESIUM SERPL-MCNC: 2.4 MG/DL — SIGNIFICANT CHANGE UP (ref 1.6–2.6)
MCHC RBC-ENTMCNC: 31.7 PG — SIGNIFICANT CHANGE UP (ref 27–34)
MCHC RBC-ENTMCNC: 32.3 G/DL — SIGNIFICANT CHANGE UP (ref 32–36)
MCV RBC AUTO: 98.3 FL — SIGNIFICANT CHANGE UP (ref 80–100)
NONHDLC SERPL-MCNC: 107 MG/DL — SIGNIFICANT CHANGE UP
NRBC # BLD AUTO: 0 K/UL — SIGNIFICANT CHANGE UP (ref 0–0)
NRBC # FLD: 0 K/UL — SIGNIFICANT CHANGE UP (ref 0–0)
NRBC BLD AUTO-RTO: 0 /100 WBCS — SIGNIFICANT CHANGE UP (ref 0–0)
PHOSPHATE SERPL-MCNC: 2.9 MG/DL — SIGNIFICANT CHANGE UP (ref 2.5–4.5)
PLATELET # BLD AUTO: 184 K/UL — SIGNIFICANT CHANGE UP (ref 150–400)
PMV BLD: 10.9 FL — SIGNIFICANT CHANGE UP (ref 7–13)
POTASSIUM SERPL-MCNC: 3.6 MMOL/L — SIGNIFICANT CHANGE UP (ref 3.5–5.3)
POTASSIUM SERPL-SCNC: 3.6 MMOL/L — SIGNIFICANT CHANGE UP (ref 3.5–5.3)
RBC # BLD: 3.56 M/UL — LOW (ref 4.2–5.8)
RBC # FLD: 13.8 % — SIGNIFICANT CHANGE UP (ref 10.3–14.5)
SODIUM SERPL-SCNC: 138 MMOL/L — SIGNIFICANT CHANGE UP (ref 135–145)
TRIGL SERPL-MCNC: 70 MG/DL — SIGNIFICANT CHANGE UP
TSH SERPL-MCNC: 2.16 UIU/ML — SIGNIFICANT CHANGE UP (ref 0.27–4.2)
WBC # BLD: 6.69 K/UL — SIGNIFICANT CHANGE UP (ref 3.8–10.5)
WBC # FLD AUTO: 6.69 K/UL — SIGNIFICANT CHANGE UP (ref 3.8–10.5)

## 2025-07-28 PROCEDURE — 80053 COMPREHEN METABOLIC PANEL: CPT

## 2025-07-28 PROCEDURE — 83036 HEMOGLOBIN GLYCOSYLATED A1C: CPT

## 2025-07-28 PROCEDURE — 80048 BASIC METABOLIC PNL TOTAL CA: CPT

## 2025-07-28 PROCEDURE — 36415 COLL VENOUS BLD VENIPUNCTURE: CPT

## 2025-07-28 PROCEDURE — 85027 COMPLETE CBC AUTOMATED: CPT

## 2025-07-28 PROCEDURE — 82962 GLUCOSE BLOOD TEST: CPT

## 2025-07-28 PROCEDURE — 99223 1ST HOSP IP/OBS HIGH 75: CPT

## 2025-07-28 PROCEDURE — 84484 ASSAY OF TROPONIN QUANT: CPT

## 2025-07-28 PROCEDURE — 99233 SBSQ HOSP IP/OBS HIGH 50: CPT

## 2025-07-28 PROCEDURE — 82803 BLOOD GASES ANY COMBINATION: CPT

## 2025-07-28 PROCEDURE — 84100 ASSAY OF PHOSPHORUS: CPT

## 2025-07-28 PROCEDURE — 81001 URINALYSIS AUTO W/SCOPE: CPT

## 2025-07-28 PROCEDURE — 83735 ASSAY OF MAGNESIUM: CPT

## 2025-07-28 PROCEDURE — 83690 ASSAY OF LIPASE: CPT

## 2025-07-28 PROCEDURE — 84132 ASSAY OF SERUM POTASSIUM: CPT

## 2025-07-28 PROCEDURE — 80061 LIPID PANEL: CPT

## 2025-07-28 PROCEDURE — 84443 ASSAY THYROID STIM HORMONE: CPT

## 2025-07-28 PROCEDURE — 82330 ASSAY OF CALCIUM: CPT

## 2025-07-28 PROCEDURE — 87040 BLOOD CULTURE FOR BACTERIA: CPT

## 2025-07-28 PROCEDURE — 84295 ASSAY OF SERUM SODIUM: CPT

## 2025-07-28 PROCEDURE — 83605 ASSAY OF LACTIC ACID: CPT

## 2025-07-28 PROCEDURE — 85025 COMPLETE CBC W/AUTO DIFF WBC: CPT

## 2025-07-28 RX ORDER — LEVETIRACETAM 10 MG/ML
250 INJECTION, SOLUTION INTRAVENOUS AT BEDTIME
Refills: 0 | Status: DISCONTINUED | OUTPATIENT
Start: 2025-07-28 | End: 2025-07-28

## 2025-07-28 RX ORDER — MELATONIN 5 MG
5 TABLET ORAL AT BEDTIME
Refills: 0 | Status: DISCONTINUED | OUTPATIENT
Start: 2025-07-28 | End: 2025-07-31

## 2025-07-28 RX ORDER — HEPARIN SODIUM 1000 [USP'U]/ML
5000 INJECTION INTRAVENOUS; SUBCUTANEOUS EVERY 8 HOURS
Refills: 0 | Status: DISCONTINUED | OUTPATIENT
Start: 2025-07-28 | End: 2025-07-31

## 2025-07-28 RX ORDER — SODIUM CHLORIDE 9 G/1000ML
1000 INJECTION, SOLUTION INTRAVENOUS
Refills: 0 | Status: DISCONTINUED | OUTPATIENT
Start: 2025-07-28 | End: 2025-07-28

## 2025-07-28 RX ADMIN — HEPARIN SODIUM 5000 UNIT(S): 1000 INJECTION INTRAVENOUS; SUBCUTANEOUS at 07:03

## 2025-07-28 RX ADMIN — HEPARIN SODIUM 5000 UNIT(S): 1000 INJECTION INTRAVENOUS; SUBCUTANEOUS at 13:12

## 2025-07-28 RX ADMIN — Medication 5 MILLIGRAM(S): at 00:39

## 2025-07-28 RX ADMIN — Medication 5 MILLIGRAM(S): at 21:26

## 2025-07-28 RX ADMIN — Medication 81 MILLIGRAM(S): at 10:32

## 2025-07-28 RX ADMIN — CLOPIDOGREL BISULFATE 75 MILLIGRAM(S): 75 TABLET, FILM COATED ORAL at 10:32

## 2025-07-28 RX ADMIN — Medication 20 MILLIGRAM(S): at 10:31

## 2025-07-28 RX ADMIN — SODIUM CHLORIDE 70 MILLILITER(S): 9 INJECTION, SOLUTION INTRAVENOUS at 06:24

## 2025-07-28 RX ADMIN — Medication 250 MILLIGRAM(S): at 13:12

## 2025-07-28 RX ADMIN — Medication 40 MILLIEQUIVALENT(S): at 10:31

## 2025-07-28 RX ADMIN — Medication 250 MILLIGRAM(S): at 21:24

## 2025-07-29 LAB
BASOPHILS # BLD AUTO: 0.05 K/UL — SIGNIFICANT CHANGE UP (ref 0–0.2)
BASOPHILS NFR BLD AUTO: 0.7 % — SIGNIFICANT CHANGE UP (ref 0–2)
EOSINOPHIL # BLD AUTO: 0.08 K/UL — SIGNIFICANT CHANGE UP (ref 0–0.5)
EOSINOPHIL NFR BLD AUTO: 1.1 % — SIGNIFICANT CHANGE UP (ref 0–6)
HCT VFR BLD CALC: 36.5 % — LOW (ref 39–50)
HGB BLD-MCNC: 11.6 G/DL — LOW (ref 13–17)
IMM GRANULOCYTES # BLD AUTO: 0.03 K/UL — SIGNIFICANT CHANGE UP (ref 0–0.07)
IMM GRANULOCYTES NFR BLD AUTO: 0.4 % — SIGNIFICANT CHANGE UP (ref 0–0.9)
LYMPHOCYTES # BLD AUTO: 1.8 K/UL — SIGNIFICANT CHANGE UP (ref 1–3.3)
LYMPHOCYTES NFR BLD AUTO: 24.4 % — SIGNIFICANT CHANGE UP (ref 13–44)
MCHC RBC-ENTMCNC: 31.4 PG — SIGNIFICANT CHANGE UP (ref 27–34)
MCHC RBC-ENTMCNC: 31.8 G/DL — LOW (ref 32–36)
MCV RBC AUTO: 98.9 FL — SIGNIFICANT CHANGE UP (ref 80–100)
MONOCYTES # BLD AUTO: 0.63 K/UL — SIGNIFICANT CHANGE UP (ref 0–0.9)
MONOCYTES NFR BLD AUTO: 8.5 % — SIGNIFICANT CHANGE UP (ref 2–14)
NEUTROPHILS # BLD AUTO: 4.8 K/UL — SIGNIFICANT CHANGE UP (ref 1.8–7.4)
NEUTROPHILS NFR BLD AUTO: 64.9 % — SIGNIFICANT CHANGE UP (ref 43–77)
NRBC # BLD AUTO: 0 K/UL — SIGNIFICANT CHANGE UP (ref 0–0)
NRBC # FLD: 0 K/UL — SIGNIFICANT CHANGE UP (ref 0–0)
NRBC BLD AUTO-RTO: 0 /100 WBCS — SIGNIFICANT CHANGE UP (ref 0–0)
PLATELET # BLD AUTO: 188 K/UL — SIGNIFICANT CHANGE UP (ref 150–400)
PMV BLD: 10.7 FL — SIGNIFICANT CHANGE UP (ref 7–13)
RBC # BLD: 3.69 M/UL — LOW (ref 4.2–5.8)
RBC # FLD: 13.9 % — SIGNIFICANT CHANGE UP (ref 10.3–14.5)
WBC # BLD: 7.39 K/UL — SIGNIFICANT CHANGE UP (ref 3.8–10.5)
WBC # FLD AUTO: 7.39 K/UL — SIGNIFICANT CHANGE UP (ref 3.8–10.5)

## 2025-07-29 PROCEDURE — 83605 ASSAY OF LACTIC ACID: CPT

## 2025-07-29 PROCEDURE — 71045 X-RAY EXAM CHEST 1 VIEW: CPT

## 2025-07-29 PROCEDURE — 99233 SBSQ HOSP IP/OBS HIGH 50: CPT

## 2025-07-29 PROCEDURE — 70450 CT HEAD/BRAIN W/O DYE: CPT

## 2025-07-29 PROCEDURE — 97161 PT EVAL LOW COMPLEX 20 MIN: CPT

## 2025-07-29 PROCEDURE — 87040 BLOOD CULTURE FOR BACTERIA: CPT

## 2025-07-29 PROCEDURE — 84132 ASSAY OF SERUM POTASSIUM: CPT

## 2025-07-29 PROCEDURE — 83690 ASSAY OF LIPASE: CPT

## 2025-07-29 PROCEDURE — 80048 BASIC METABOLIC PNL TOTAL CA: CPT

## 2025-07-29 PROCEDURE — 82962 GLUCOSE BLOOD TEST: CPT

## 2025-07-29 PROCEDURE — 84484 ASSAY OF TROPONIN QUANT: CPT

## 2025-07-29 PROCEDURE — 81001 URINALYSIS AUTO W/SCOPE: CPT

## 2025-07-29 PROCEDURE — 93005 ELECTROCARDIOGRAM TRACING: CPT

## 2025-07-29 PROCEDURE — 84295 ASSAY OF SERUM SODIUM: CPT

## 2025-07-29 PROCEDURE — 84443 ASSAY THYROID STIM HORMONE: CPT

## 2025-07-29 PROCEDURE — 97165 OT EVAL LOW COMPLEX 30 MIN: CPT

## 2025-07-29 PROCEDURE — 82330 ASSAY OF CALCIUM: CPT

## 2025-07-29 PROCEDURE — 83036 HEMOGLOBIN GLYCOSYLATED A1C: CPT

## 2025-07-29 PROCEDURE — 85027 COMPLETE CBC AUTOMATED: CPT

## 2025-07-29 PROCEDURE — 99222 1ST HOSP IP/OBS MODERATE 55: CPT | Mod: GC

## 2025-07-29 PROCEDURE — 85025 COMPLETE CBC W/AUTO DIFF WBC: CPT

## 2025-07-29 PROCEDURE — 80053 COMPREHEN METABOLIC PANEL: CPT

## 2025-07-29 PROCEDURE — 82803 BLOOD GASES ANY COMBINATION: CPT

## 2025-07-29 PROCEDURE — 84100 ASSAY OF PHOSPHORUS: CPT

## 2025-07-29 PROCEDURE — 80061 LIPID PANEL: CPT

## 2025-07-29 PROCEDURE — 36415 COLL VENOUS BLD VENIPUNCTURE: CPT

## 2025-07-29 PROCEDURE — 83735 ASSAY OF MAGNESIUM: CPT

## 2025-07-29 RX ORDER — OLANZAPINE 10 MG/1
2.5 TABLET ORAL ONCE
Refills: 0 | Status: COMPLETED | OUTPATIENT
Start: 2025-07-29 | End: 2025-07-29

## 2025-07-29 RX ORDER — QUETIAPINE FUMARATE 25 MG/1
12.5 TABLET ORAL EVERY 24 HOURS
Refills: 0 | Status: DISCONTINUED | OUTPATIENT
Start: 2025-07-29 | End: 2025-07-31

## 2025-07-29 RX ADMIN — ATORVASTATIN CALCIUM 80 MILLIGRAM(S): 80 TABLET, FILM COATED ORAL at 22:59

## 2025-07-29 RX ADMIN — QUETIAPINE FUMARATE 12.5 MILLIGRAM(S): 25 TABLET ORAL at 18:29

## 2025-07-29 RX ADMIN — Medication 75 MILLILITER(S): at 16:30

## 2025-07-29 RX ADMIN — Medication 81 MILLIGRAM(S): at 15:10

## 2025-07-29 RX ADMIN — HEPARIN SODIUM 5000 UNIT(S): 1000 INJECTION INTRAVENOUS; SUBCUTANEOUS at 23:53

## 2025-07-29 RX ADMIN — CLOPIDOGREL BISULFATE 75 MILLIGRAM(S): 75 TABLET, FILM COATED ORAL at 15:10

## 2025-07-29 RX ADMIN — Medication 20 MILLIGRAM(S): at 15:10

## 2025-07-29 RX ADMIN — OLANZAPINE 2.5 MILLIGRAM(S): 10 TABLET ORAL at 02:44

## 2025-07-29 RX ADMIN — Medication 5 MILLIGRAM(S): at 22:59

## 2025-07-29 RX ADMIN — Medication 250 MILLIGRAM(S): at 09:28

## 2025-07-29 RX ADMIN — OLANZAPINE 2.5 MILLIGRAM(S): 10 TABLET ORAL at 01:32

## 2025-07-29 RX ADMIN — Medication 500 MILLIGRAM(S): at 23:57

## 2025-07-30 ENCOUNTER — RESULT REVIEW (OUTPATIENT)
Age: 85
End: 2025-07-30

## 2025-07-30 LAB
ANION GAP SERPL CALC-SCNC: 13 MMOL/L — SIGNIFICANT CHANGE UP (ref 5–17)
BUN SERPL-MCNC: 8 MG/DL — SIGNIFICANT CHANGE UP (ref 7–23)
CALCIUM SERPL-MCNC: 9.5 MG/DL — SIGNIFICANT CHANGE UP (ref 8.4–10.5)
CHLORIDE SERPL-SCNC: 107 MMOL/L — SIGNIFICANT CHANGE UP (ref 96–108)
CO2 SERPL-SCNC: 22 MMOL/L — SIGNIFICANT CHANGE UP (ref 22–31)
CREAT SERPL-MCNC: 1.31 MG/DL — HIGH (ref 0.5–1.3)
EGFR: 53 ML/MIN/1.73M2 — LOW
EGFR: 53 ML/MIN/1.73M2 — LOW
GLUCOSE SERPL-MCNC: 79 MG/DL — SIGNIFICANT CHANGE UP (ref 70–99)
HCT VFR BLD CALC: 38.6 % — LOW (ref 39–50)
HGB BLD-MCNC: 12.2 G/DL — LOW (ref 13–17)
MAGNESIUM SERPL-MCNC: 2.2 MG/DL — SIGNIFICANT CHANGE UP (ref 1.6–2.6)
MCHC RBC-ENTMCNC: 31.6 G/DL — LOW (ref 32–36)
MCHC RBC-ENTMCNC: 31.8 PG — SIGNIFICANT CHANGE UP (ref 27–34)
MCV RBC AUTO: 100.5 FL — HIGH (ref 80–100)
NRBC # BLD AUTO: 0 K/UL — SIGNIFICANT CHANGE UP (ref 0–0)
NRBC # FLD: 0 K/UL — SIGNIFICANT CHANGE UP (ref 0–0)
NRBC BLD AUTO-RTO: 0 /100 WBCS — SIGNIFICANT CHANGE UP (ref 0–0)
PHOSPHATE SERPL-MCNC: 3.2 MG/DL — SIGNIFICANT CHANGE UP (ref 2.5–4.5)
PLATELET # BLD AUTO: 174 K/UL — SIGNIFICANT CHANGE UP (ref 150–400)
PMV BLD: 11 FL — SIGNIFICANT CHANGE UP (ref 7–13)
POTASSIUM SERPL-MCNC: 3.7 MMOL/L — SIGNIFICANT CHANGE UP (ref 3.5–5.3)
POTASSIUM SERPL-SCNC: 3.7 MMOL/L — SIGNIFICANT CHANGE UP (ref 3.5–5.3)
RBC # BLD: 3.84 M/UL — LOW (ref 4.2–5.8)
RBC # FLD: 14.1 % — SIGNIFICANT CHANGE UP (ref 10.3–14.5)
SODIUM SERPL-SCNC: 142 MMOL/L — SIGNIFICANT CHANGE UP (ref 135–145)
WBC # BLD: 5.42 K/UL — SIGNIFICANT CHANGE UP (ref 3.8–10.5)
WBC # FLD AUTO: 5.42 K/UL — SIGNIFICANT CHANGE UP (ref 3.8–10.5)

## 2025-07-30 PROCEDURE — 70498 CT ANGIOGRAPHY NECK: CPT | Mod: 26

## 2025-07-30 PROCEDURE — 70496 CT ANGIOGRAPHY HEAD: CPT | Mod: 26

## 2025-07-30 PROCEDURE — 99233 SBSQ HOSP IP/OBS HIGH 50: CPT

## 2025-07-30 PROCEDURE — 99232 SBSQ HOSP IP/OBS MODERATE 35: CPT

## 2025-07-30 PROCEDURE — 70551 MRI BRAIN STEM W/O DYE: CPT | Mod: 26

## 2025-07-30 PROCEDURE — 93306 TTE W/DOPPLER COMPLETE: CPT | Mod: 26

## 2025-07-30 PROCEDURE — 99232 SBSQ HOSP IP/OBS MODERATE 35: CPT | Mod: GC

## 2025-07-30 RX ADMIN — HEPARIN SODIUM 5000 UNIT(S): 1000 INJECTION INTRAVENOUS; SUBCUTANEOUS at 07:36

## 2025-07-30 RX ADMIN — QUETIAPINE FUMARATE 12.5 MILLIGRAM(S): 25 TABLET ORAL at 18:35

## 2025-07-30 RX ADMIN — Medication 20 MILLIGRAM(S): at 11:33

## 2025-07-30 RX ADMIN — CLOPIDOGREL BISULFATE 75 MILLIGRAM(S): 75 TABLET, FILM COATED ORAL at 11:34

## 2025-07-30 RX ADMIN — HEPARIN SODIUM 5000 UNIT(S): 1000 INJECTION INTRAVENOUS; SUBCUTANEOUS at 16:59

## 2025-07-30 RX ADMIN — Medication 40 MILLIEQUIVALENT(S): at 08:36

## 2025-07-30 RX ADMIN — Medication 81 MILLIGRAM(S): at 11:34

## 2025-07-30 RX ADMIN — Medication 500 MILLIGRAM(S): at 11:33

## 2025-07-31 ENCOUNTER — TRANSCRIPTION ENCOUNTER (OUTPATIENT)
Age: 85
End: 2025-07-31

## 2025-07-31 VITALS
OXYGEN SATURATION: 100 % | RESPIRATION RATE: 24 BRPM | HEART RATE: 58 BPM | DIASTOLIC BLOOD PRESSURE: 69 MMHG | SYSTOLIC BLOOD PRESSURE: 133 MMHG

## 2025-07-31 LAB
ALBUMIN SERPL ELPH-MCNC: 3.4 G/DL — SIGNIFICANT CHANGE UP (ref 3.3–5)
ALP SERPL-CCNC: 62 U/L — SIGNIFICANT CHANGE UP (ref 40–120)
ALT FLD-CCNC: 9 U/L — LOW (ref 10–45)
ANION GAP SERPL CALC-SCNC: 11 MMOL/L — SIGNIFICANT CHANGE UP (ref 5–17)
AST SERPL-CCNC: 15 U/L — SIGNIFICANT CHANGE UP (ref 10–40)
BASOPHILS # BLD AUTO: 0.03 K/UL — SIGNIFICANT CHANGE UP (ref 0–0.2)
BASOPHILS NFR BLD AUTO: 0.7 % — SIGNIFICANT CHANGE UP (ref 0–2)
BILIRUB SERPL-MCNC: 0.4 MG/DL — SIGNIFICANT CHANGE UP (ref 0.2–1.2)
BUN SERPL-MCNC: 12 MG/DL — SIGNIFICANT CHANGE UP (ref 7–23)
CALCIUM SERPL-MCNC: 9.2 MG/DL — SIGNIFICANT CHANGE UP (ref 8.4–10.5)
CHLORIDE SERPL-SCNC: 110 MMOL/L — HIGH (ref 96–108)
CO2 SERPL-SCNC: 22 MMOL/L — SIGNIFICANT CHANGE UP (ref 22–31)
CREAT SERPL-MCNC: 1.25 MG/DL — SIGNIFICANT CHANGE UP (ref 0.5–1.3)
EGFR: 56 ML/MIN/1.73M2 — LOW
EGFR: 56 ML/MIN/1.73M2 — LOW
EOSINOPHIL # BLD AUTO: 0.21 K/UL — SIGNIFICANT CHANGE UP (ref 0–0.5)
EOSINOPHIL NFR BLD AUTO: 4.6 % — SIGNIFICANT CHANGE UP (ref 0–6)
GLUCOSE SERPL-MCNC: 83 MG/DL — SIGNIFICANT CHANGE UP (ref 70–99)
HCT VFR BLD CALC: 36.6 % — LOW (ref 39–50)
HGB BLD-MCNC: 11.6 G/DL — LOW (ref 13–17)
IMM GRANULOCYTES # BLD AUTO: 0.01 K/UL — SIGNIFICANT CHANGE UP (ref 0–0.07)
IMM GRANULOCYTES NFR BLD AUTO: 0.2 % — SIGNIFICANT CHANGE UP (ref 0–0.9)
LYMPHOCYTES # BLD AUTO: 2.05 K/UL — SIGNIFICANT CHANGE UP (ref 1–3.3)
LYMPHOCYTES NFR BLD AUTO: 45.4 % — HIGH (ref 13–44)
MAGNESIUM SERPL-MCNC: 2.5 MG/DL — SIGNIFICANT CHANGE UP (ref 1.6–2.6)
MCHC RBC-ENTMCNC: 31.7 G/DL — LOW (ref 32–36)
MCHC RBC-ENTMCNC: 31.8 PG — SIGNIFICANT CHANGE UP (ref 27–34)
MCV RBC AUTO: 100.3 FL — HIGH (ref 80–100)
MONOCYTES # BLD AUTO: 0.35 K/UL — SIGNIFICANT CHANGE UP (ref 0–0.9)
MONOCYTES NFR BLD AUTO: 7.7 % — SIGNIFICANT CHANGE UP (ref 2–14)
NEUTROPHILS # BLD AUTO: 1.87 K/UL — SIGNIFICANT CHANGE UP (ref 1.8–7.4)
NEUTROPHILS NFR BLD AUTO: 41.4 % — LOW (ref 43–77)
NRBC # BLD AUTO: 0 K/UL — SIGNIFICANT CHANGE UP (ref 0–0)
NRBC # FLD: 0 K/UL — SIGNIFICANT CHANGE UP (ref 0–0)
NRBC BLD AUTO-RTO: 0 /100 WBCS — SIGNIFICANT CHANGE UP (ref 0–0)
PHOSPHATE SERPL-MCNC: 3.6 MG/DL — SIGNIFICANT CHANGE UP (ref 2.5–4.5)
PLATELET # BLD AUTO: 161 K/UL — SIGNIFICANT CHANGE UP (ref 150–400)
PMV BLD: 10.6 FL — SIGNIFICANT CHANGE UP (ref 7–13)
POTASSIUM SERPL-MCNC: 4 MMOL/L — SIGNIFICANT CHANGE UP (ref 3.5–5.3)
POTASSIUM SERPL-SCNC: 4 MMOL/L — SIGNIFICANT CHANGE UP (ref 3.5–5.3)
PROT SERPL-MCNC: 5.7 G/DL — LOW (ref 6–8.3)
RBC # BLD: 3.65 M/UL — LOW (ref 4.2–5.8)
RBC # FLD: 14 % — SIGNIFICANT CHANGE UP (ref 10.3–14.5)
SODIUM SERPL-SCNC: 143 MMOL/L — SIGNIFICANT CHANGE UP (ref 135–145)
T PALLIDUM AB TITR SER: NEGATIVE — SIGNIFICANT CHANGE UP
VIT B12 SERPL-MCNC: 1224 PG/ML — SIGNIFICANT CHANGE UP (ref 232–1245)
WBC # BLD: 4.52 K/UL — SIGNIFICANT CHANGE UP (ref 3.8–10.5)
WBC # FLD AUTO: 4.52 K/UL — SIGNIFICANT CHANGE UP (ref 3.8–10.5)

## 2025-07-31 PROCEDURE — 85025 COMPLETE CBC W/AUTO DIFF WBC: CPT

## 2025-07-31 PROCEDURE — 82607 VITAMIN B-12: CPT

## 2025-07-31 PROCEDURE — 97161 PT EVAL LOW COMPLEX 20 MIN: CPT

## 2025-07-31 PROCEDURE — 81001 URINALYSIS AUTO W/SCOPE: CPT

## 2025-07-31 PROCEDURE — 83735 ASSAY OF MAGNESIUM: CPT

## 2025-07-31 PROCEDURE — 97110 THERAPEUTIC EXERCISES: CPT

## 2025-07-31 PROCEDURE — 71045 X-RAY EXAM CHEST 1 VIEW: CPT

## 2025-07-31 PROCEDURE — 36415 COLL VENOUS BLD VENIPUNCTURE: CPT

## 2025-07-31 PROCEDURE — 87040 BLOOD CULTURE FOR BACTERIA: CPT

## 2025-07-31 PROCEDURE — 97112 NEUROMUSCULAR REEDUCATION: CPT

## 2025-07-31 PROCEDURE — 84484 ASSAY OF TROPONIN QUANT: CPT

## 2025-07-31 PROCEDURE — 84443 ASSAY THYROID STIM HORMONE: CPT

## 2025-07-31 PROCEDURE — 84132 ASSAY OF SERUM POTASSIUM: CPT

## 2025-07-31 PROCEDURE — 82962 GLUCOSE BLOOD TEST: CPT

## 2025-07-31 PROCEDURE — 99233 SBSQ HOSP IP/OBS HIGH 50: CPT

## 2025-07-31 PROCEDURE — 97165 OT EVAL LOW COMPLEX 30 MIN: CPT

## 2025-07-31 PROCEDURE — 80053 COMPREHEN METABOLIC PANEL: CPT

## 2025-07-31 PROCEDURE — 93306 TTE W/DOPPLER COMPLETE: CPT

## 2025-07-31 PROCEDURE — 83036 HEMOGLOBIN GLYCOSYLATED A1C: CPT

## 2025-07-31 PROCEDURE — 99285 EMERGENCY DEPT VISIT HI MDM: CPT | Mod: 25

## 2025-07-31 PROCEDURE — 83690 ASSAY OF LIPASE: CPT

## 2025-07-31 PROCEDURE — 70450 CT HEAD/BRAIN W/O DYE: CPT

## 2025-07-31 PROCEDURE — 82330 ASSAY OF CALCIUM: CPT

## 2025-07-31 PROCEDURE — 84295 ASSAY OF SERUM SODIUM: CPT

## 2025-07-31 PROCEDURE — 93005 ELECTROCARDIOGRAM TRACING: CPT

## 2025-07-31 PROCEDURE — 82803 BLOOD GASES ANY COMBINATION: CPT

## 2025-07-31 PROCEDURE — 84100 ASSAY OF PHOSPHORUS: CPT

## 2025-07-31 PROCEDURE — 70498 CT ANGIOGRAPHY NECK: CPT

## 2025-07-31 PROCEDURE — 85027 COMPLETE CBC AUTOMATED: CPT

## 2025-07-31 PROCEDURE — 83605 ASSAY OF LACTIC ACID: CPT

## 2025-07-31 PROCEDURE — 70551 MRI BRAIN STEM W/O DYE: CPT

## 2025-07-31 PROCEDURE — 70496 CT ANGIOGRAPHY HEAD: CPT

## 2025-07-31 PROCEDURE — 80048 BASIC METABOLIC PNL TOTAL CA: CPT

## 2025-07-31 PROCEDURE — 86780 TREPONEMA PALLIDUM: CPT

## 2025-07-31 PROCEDURE — 80061 LIPID PANEL: CPT

## 2025-07-31 RX ORDER — ATORVASTATIN CALCIUM 80 MG/1
1 TABLET, FILM COATED ORAL
Qty: 30 | Refills: 2
Start: 2025-07-31

## 2025-07-31 RX ORDER — TRAZODONE HCL 100 MG
1 TABLET ORAL
Refills: 0 | DISCHARGE

## 2025-07-31 RX ADMIN — CLOPIDOGREL BISULFATE 75 MILLIGRAM(S): 75 TABLET, FILM COATED ORAL at 11:00

## 2025-07-31 RX ADMIN — Medication 81 MILLIGRAM(S): at 11:00

## 2025-07-31 RX ADMIN — Medication 20 MILLIGRAM(S): at 11:00

## 2025-07-31 RX ADMIN — Medication 500 MILLIGRAM(S): at 11:00

## 2025-07-31 RX ADMIN — HEPARIN SODIUM 5000 UNIT(S): 1000 INJECTION INTRAVENOUS; SUBCUTANEOUS at 00:12

## 2025-07-31 RX ADMIN — Medication 5 MILLIGRAM(S): at 00:12

## 2025-07-31 RX ADMIN — HEPARIN SODIUM 5000 UNIT(S): 1000 INJECTION INTRAVENOUS; SUBCUTANEOUS at 07:29

## 2025-07-31 RX ADMIN — Medication 500 MILLIGRAM(S): at 00:12

## 2025-08-04 ENCOUNTER — NON-APPOINTMENT (OUTPATIENT)
Age: 85
End: 2025-08-04

## 2025-08-04 ENCOUNTER — APPOINTMENT (OUTPATIENT)
Dept: NEUROLOGY | Facility: CLINIC | Age: 85
End: 2025-08-04

## 2025-08-04 VITALS
BODY MASS INDEX: 23.04 KG/M2 | WEIGHT: 152 LBS | HEIGHT: 68 IN | DIASTOLIC BLOOD PRESSURE: 83 MMHG | TEMPERATURE: 99.5 F | HEART RATE: 79 BPM | OXYGEN SATURATION: 98 % | SYSTOLIC BLOOD PRESSURE: 151 MMHG

## 2025-08-04 DIAGNOSIS — R26.89 OTHER ABNORMALITIES OF GAIT AND MOBILITY: ICD-10-CM

## 2025-08-04 DIAGNOSIS — R47.01 APHASIA: ICD-10-CM

## 2025-08-04 DIAGNOSIS — F03.90 UNSPECIFIED DEMENTIA W/OUT BEHAVIORAL DISTURBANCE: ICD-10-CM

## 2025-08-04 DIAGNOSIS — R47.1 DYSARTHRIA AND ANARTHRIA: ICD-10-CM

## 2025-08-04 DIAGNOSIS — F01.50 VASCULAR DEMENTIA W/OUT BEHAVIORAL DISTURBANCE: ICD-10-CM

## 2025-08-04 PROCEDURE — G2211 COMPLEX E/M VISIT ADD ON: CPT

## 2025-08-04 PROCEDURE — 99215 OFFICE O/P EST HI 40 MIN: CPT

## 2025-08-04 PROCEDURE — G2212 PROLONG OUTPT/OFFICE VIS: CPT

## 2025-08-04 RX ORDER — MEMANTINE HYDROCHLORIDE 5 MG-10 MG
28 X 5 MG & KIT ORAL
Qty: 1 | Refills: 0 | Status: ACTIVE | COMMUNITY
Start: 2025-08-04 | End: 1900-01-01

## 2025-08-13 DIAGNOSIS — F05 DELIRIUM DUE TO KNOWN PHYSIOLOGICAL CONDITION: ICD-10-CM

## 2025-08-13 DIAGNOSIS — G92.8 OTHER TOXIC ENCEPHALOPATHY: ICD-10-CM

## 2025-08-13 DIAGNOSIS — I65.21 OCCLUSION AND STENOSIS OF RIGHT CAROTID ARTERY: ICD-10-CM

## 2025-08-13 DIAGNOSIS — E78.1 PURE HYPERGLYCERIDEMIA: ICD-10-CM

## 2025-08-13 DIAGNOSIS — F02.C11 DEMENTIA IN OTHER DISEASES CLASSIFIED ELSEWHERE, SEVERE, WITH AGITATION: ICD-10-CM

## 2025-08-13 DIAGNOSIS — G91.2 (IDIOPATHIC) NORMAL PRESSURE HYDROCEPHALUS: ICD-10-CM

## 2025-08-13 DIAGNOSIS — T43.215A ADVERSE EFFECT OF SELECTIVE SEROTONIN AND NOREPINEPHRINE REUPTAKE INHIBITORS, INITIAL ENCOUNTER: ICD-10-CM

## 2025-08-13 DIAGNOSIS — G31.01 PICK'S DISEASE: ICD-10-CM

## 2025-08-13 DIAGNOSIS — E78.5 HYPERLIPIDEMIA, UNSPECIFIED: ICD-10-CM

## 2025-08-13 DIAGNOSIS — E43 UNSPECIFIED SEVERE PROTEIN-CALORIE MALNUTRITION: ICD-10-CM

## 2025-08-13 DIAGNOSIS — D63.1 ANEMIA IN CHRONIC KIDNEY DISEASE: ICD-10-CM

## 2025-08-13 DIAGNOSIS — G47.00 INSOMNIA, UNSPECIFIED: ICD-10-CM

## 2025-08-13 DIAGNOSIS — I12.9 HYPERTENSIVE CHRONIC KIDNEY DISEASE WITH STAGE 1 THROUGH STAGE 4 CHRONIC KIDNEY DISEASE, OR UNSPECIFIED CHRONIC KIDNEY DISEASE: ICD-10-CM

## 2025-08-13 DIAGNOSIS — Z79.82 LONG TERM (CURRENT) USE OF ASPIRIN: ICD-10-CM

## 2025-08-13 DIAGNOSIS — F01.511 VASCULAR DEMENTIA, UNSPECIFIED SEVERITY, WITH AGITATION: ICD-10-CM

## 2025-08-13 DIAGNOSIS — Z85.46 PERSONAL HISTORY OF MALIGNANT NEOPLASM OF PROSTATE: ICD-10-CM

## 2025-08-13 DIAGNOSIS — Z87.891 PERSONAL HISTORY OF NICOTINE DEPENDENCE: ICD-10-CM

## 2025-08-13 DIAGNOSIS — K21.9 GASTRO-ESOPHAGEAL REFLUX DISEASE WITHOUT ESOPHAGITIS: ICD-10-CM

## 2025-08-14 RX ORDER — MEMANTINE 5 MG/1
5 TABLET ORAL
Qty: 28 | Refills: 0 | Status: ACTIVE | COMMUNITY
Start: 2025-08-14 | End: 1900-01-01

## 2025-08-14 RX ORDER — MEMANTINE 10 MG/1
10 TABLET ORAL
Qty: 21 | Refills: 0 | Status: ACTIVE | COMMUNITY
Start: 2025-08-14 | End: 1900-01-01

## 2025-08-17 RX ORDER — MEMANTINE 5 MG/1
5 TABLET ORAL
Qty: 120 | Refills: 2 | Status: ACTIVE | COMMUNITY
Start: 2025-08-17 | End: 1900-01-01

## 2025-09-18 ENCOUNTER — NON-APPOINTMENT (OUTPATIENT)
Age: 85
End: 2025-09-18